# Patient Record
Sex: FEMALE | Race: BLACK OR AFRICAN AMERICAN | NOT HISPANIC OR LATINO | Employment: FULL TIME | URBAN - METROPOLITAN AREA
[De-identification: names, ages, dates, MRNs, and addresses within clinical notes are randomized per-mention and may not be internally consistent; named-entity substitution may affect disease eponyms.]

---

## 2017-01-20 ENCOUNTER — OFFICE VISIT (OUTPATIENT)
Dept: ENDOCRINOLOGY | Facility: CLINIC | Age: 27
End: 2017-01-20

## 2017-01-20 VITALS
SYSTOLIC BLOOD PRESSURE: 125 MMHG | HEART RATE: 76 BPM | HEIGHT: 61 IN | DIASTOLIC BLOOD PRESSURE: 71 MMHG | WEIGHT: 293 LBS | BODY MASS INDEX: 55.32 KG/M2 | OXYGEN SATURATION: 98 %

## 2017-01-20 DIAGNOSIS — E66.01 MORBID OBESITY, UNSPECIFIED OBESITY TYPE (H): Primary | ICD-10-CM

## 2017-01-20 RX ORDER — TOPIRAMATE 25 MG/1
TABLET, FILM COATED ORAL
Qty: 90 TABLET | Refills: 1 | Status: SHIPPED | OUTPATIENT
Start: 2017-01-20 | End: 2017-04-15

## 2017-01-20 RX ORDER — PHENTERMINE HYDROCHLORIDE 37.5 MG/1
37.5 TABLET ORAL
Qty: 30 TABLET | Refills: 3 | Status: SHIPPED | OUTPATIENT
Start: 2017-01-20 | End: 2017-03-17

## 2017-01-20 ASSESSMENT — ENCOUNTER SYMPTOMS
SINUS PAIN: 0
NERVOUS/ANXIOUS: 0
HEADACHES: 0
NAIL CHANGES: 0
TROUBLE SWALLOWING: 0
BREAST PAIN: 0
WEIGHT LOSS: 0
STIFFNESS: 0
BACK PAIN: 0
MUSCLE WEAKNESS: 0
HEMOPTYSIS: 0
LIGHT-HEADEDNESS: 0
SNORES LOUDLY: 0
HOARSE VOICE: 0
FATIGUE: 0
TASTE DISTURBANCE: 0
DYSURIA: 0
POLYPHAGIA: 0
JAUNDICE: 0
EXTREMITY NUMBNESS: 0
RECTAL PAIN: 0
SEIZURES: 0
DOUBLE VISION: 0
MEMORY LOSS: 0
HEMATURIA: 0
WHEEZING: 0
HEARTBURN: 0
LEG PAIN: 0
VOMITING: 0
SYNCOPE: 0
TREMORS: 0
CHILLS: 0
BLOATING: 0
DISTURBANCES IN COORDINATION: 0
DECREASED CONCENTRATION: 0
DECREASED LIBIDO: 0
SHORTNESS OF BREATH: 0
EYE IRRITATION: 0
HOT FLASHES: 0
CONSTIPATION: 1
SKIN CHANGES: 0
POSTURAL DYSPNEA: 0
FEVER: 0
PARALYSIS: 0
SORE THROAT: 0
NIGHT SWEATS: 0
LOSS OF CONSCIOUSNESS: 0
BOWEL INCONTINENCE: 0
DIARRHEA: 0
RESPIRATORY PAIN: 0
POLYDIPSIA: 0
TINGLING: 0
WEAKNESS: 0
DIFFICULTY URINATING: 0
HYPOTENSION: 0
DIZZINESS: 0
ABDOMINAL PAIN: 0
BLOOD IN STOOL: 0
WEIGHT GAIN: 0
NAUSEA: 0
MYALGIAS: 0
DEPRESSION: 0
TACHYCARDIA: 0
JOINT SWELLING: 0
SLEEP DISTURBANCES DUE TO BREATHING: 0
COUGH: 0
POOR WOUND HEALING: 0
SMELL DISTURBANCE: 0
EXERCISE INTOLERANCE: 0
FLANK PAIN: 0
COUGH DISTURBING SLEEP: 0
PANIC: 0
SPUTUM PRODUCTION: 0
EYE REDNESS: 0
INSOMNIA: 0
BRUISES/BLEEDS EASILY: 0
BREAST MASS: 0
CLAUDICATION: 0
NUMBNESS: 0
INCREASED ENERGY: 0
SINUS CONGESTION: 0
HYPERTENSION: 0
MUSCLE CRAMPS: 0
EYE PAIN: 0
NECK MASS: 0
RECTAL BLEEDING: 0
DYSPNEA ON EXERTION: 0
PALPITATIONS: 0
EYE WATERING: 0
ORTHOPNEA: 0
SWOLLEN GLANDS: 0
HALLUCINATIONS: 0
LEG SWELLING: 0
ARTHRALGIAS: 0
DECREASED APPETITE: 0
ALTERED TEMPERATURE REGULATION: 0
SPEECH CHANGE: 0
NECK PAIN: 0

## 2017-01-20 ASSESSMENT — PAIN SCALES - GENERAL: PAINLEVEL: NO PAIN (0)

## 2017-01-20 NOTE — Clinical Note
"2017       RE: Morgan Mcbride  [address]  [facility] [address]     Dear Colleague,    Thank you for referring your patient, Morgan Mcbride, to the [facility] MEDICAL WEIGHT MANAGEMENT at [facility]. Please see a copy of my visit note below.        Return Medical Weight Management Note     Morgan Mcbride  MRN:  [id number]  :  1990  BEA:  2017    Dear Moi Bonilla,    I had the pleasure of seeing your patient Morgan Mcbride.  She is a 26 year old female who I am continuing to see for treatment of obesity related to:    Patient Active Problem List   Diagnosis     Obesity, morbid (H)     Gastric bypass status for obesity     Nausea with vomiting     Epigastric abdominal pain       INTERVAL HISTORY:    She underwent RNY gastric bypass surgery in . She has lost about 150 lbs but gained ~80 lbs back after she was pregnant  She saw me one month ago and we restarted phentermine.  She has had success in the past.  Since last month she has lost 21 lbs.  She feels that the phentermine helps with her appetite but the effect doesn't last all day.     She is having sugar and salt cravings.  She has cut out snacking  She is exercising 3 times per week     CURRENT WEIGHT:   303 lbs 0 oz    Wt Readings from Last 4 Encounters:   17 137.44 kg (303 lb)   16 146.965 kg (324 lb)   16 139.39 kg (307 lb 4.8 oz)   07/14/15 136.578 kg (301 lb 1.6 oz)       Height:  5' 1\"  Body Mass Index:  Body mass index is 57.28 kg/(m^2).  Vitals:  /71 mmHg  Pulse 76  Ht 1.549 m (5' 1\")  Wt 137.44 kg (303 lb)  BMI 57.28 kg/m2  SpO2 98%    Initial consult weight was 301 on 7/7/15.  Weight change since last seen on 2016 is down 21 pounds.   Total gain is 2 pounds.    Diet and Activity Changes Since Last Visit Reviewed With Patient 2017   I have made the following changes to my diet since my last visit: working out and eating " healthier   With regards to my diet, I am still struggling with: feeling full   For breakfast, I typically eat: toast water and fruit   For lunch, I typically eat: chicken or turkey veggies maybe brown rice and  water    For supper, I typically eat: chicken veggies and water   For snack(s), I typically eat: crackers,fruit or flavor water   I have made the following changes to my activity/exercise since my last visit: working out 3-4 a week   With regards to my activity/exercise, I am still struggling with: getting enough sleep       Review of Systems     Constitutional:  Negative for fever, chills, weight loss, weight gain, fatigue, decreased appetite, night sweats, recent stressors, height gain, height loss, post-operative complications, incisional pain, hallucinations, increased energy, hyperactivity and confused.   HENT:  Negative for ear pain, hearing loss, tinnitus, nosebleeds, trouble swallowing, hoarse voice, mouth sores, sore throat, ear discharge, tooth pain, gum tenderness, taste disturbance, smell disturbance, hearing aid, bleeding gums, dry mouth, sinus pain, sinus congestion and neck mass.    Eyes:  Negative for double vision, pain, redness, eye pain, decreased vision, eye watering, eye bulging, eye dryness, flashing lights, spots, floaters, strabismus, tunnel vision, jaundice and eye irritation.   Respiratory:   Negative for cough, hemoptysis, sputum production, shortness of breath, wheezing, sleep disturbances due to breathing, snores loudly, respiratory pain, dyspnea on exertion, cough disturbing sleep and postural dyspnea.    Cardiovascular:  Negative for chest pain, dyspnea on exertion, palpitations, orthopnea, claudication, leg swelling, fingers/toes turn blue, hypertension, hypotension, syncope, history of heart murmur, chest pain on exertion, chest pain at rest, pacemaker, few scattered varicosities, leg pain, sleep disturbances due to breathing, tachycardia, light-headedness, exercise  intolerance and edema.   Gastrointestinal:  Positive for constipation. Negative for heartburn, nausea, vomiting, abdominal pain, diarrhea, blood in stool, melena, rectal pain, bloating, hemorrhoids, bowel incontinence, jaundice, rectal bleeding, coffee ground emesis and change in stool.   Genitourinary:  Negative for bladder incontinence, dysuria, urgency, hematuria, flank pain, vaginal discharge, difficulty urinating, genital sores, dyspareunia, decreased libido, nocturia, voiding less frequently, arousal difficulty, abnormal vaginal bleeding, excessive menstruation, menstrual changes, hot flashes, vaginal dryness and postmenopausal bleeding.   Musculoskeletal:  Negative for myalgias, back pain, joint swelling, arthralgias, stiffness, muscle cramps, neck pain, bone pain, muscle weakness and fracture.   Skin:  Negative for nail changes, itching, poor wound healing, rash, hair changes, skin changes, acne, warts, poor wound healing, scarring, flaky skin, Raynaud's phenomenon, sensitivity to sunlight and skin thickening.   Neurological:  Negative for dizziness, tingling, tremors, speech change, seizures, loss of consciousness, weakness, light-headedness, numbness, headaches, disturbances in coordination, extremity numbness, memory loss, difficulty walking and paralysis.   Endo/Heme:  Negative for anemia, swollen glands and bruises/bleeds easily.   Psychiatric/Behavioral:  Negative for depression, hallucinations, memory loss, decreased concentration, mood swings and panic attacks.    Breast:  Negative for breast discharge, breast mass, breast pain and nipple retraction.   Endocrine:  Negative for altered temperature regulation, polyphagia, polydipsia, unwanted hair growth and change in facial hair.      MEDICATIONS:   Current Outpatient Prescriptions   Medication     phentermine (ADIPEX-P) 37.5 MG tablet     No current facility-administered medications for this visit.       Weight Loss Medication History Reviewed With  Patient 1/20/2017   Which weight loss medications are you currently taking on a regular basis?  Phentermine   If you are not taking a weight loss medication that was prescribed to you, please indicate why: Other   Are you having any side effects from the weight loss medication that we have prescribed you? No   If you are having side effects please describe: na       ASSESSMENT/PLAN: 26 y.o. Female here for [facility] follow up.  She has a history of gastric bypass.  She has lost 21 lbs since last month while taking phentermine.  She is still feeling hungry and cravings.      She has cut out all liquid calories and soda.  She has stopped drinking glass of wine daily.    She would like to consider adding Topiramate ramp up to 75mg at bedtime (she sleeps during the day and works nights). She has nexplanon for birth control.        MEDICATION STARTED AT THIS APPOINTMENT  We are starting topiramate at bedtime.  Start one tab, 25 mg, for a week. Go up to 50 mg (2 tabs) for the next week. At the third week, take   3 tabs (75 mg).  Stay at 3 tabs until you are seen again. Call the nurse at [phone] if you have any questions or concerns. (Do not stop taking it if you don't think it's working. For some people it works even though they do not feel much different.)    Topiramate (Topamax) is a medication that is used most often to treat migraine headaches or for seizures. It has also been found to help with weight loss. Although it's not currently FDA approved for weight loss, it has been used safely for a number of years to help people who are carrying extra weight.     Just how topiramate helps with weight loss has not been exactly determined. However it seems to work on areas of the brain to quiet down signals related to eating.      Topiramate may make you:    >feel less interest in eating in between meals   >think less about food and eating   >find it easier to push the plate away   >find giving up pop easier    >have an  easier time eating less    For some of our patients, the pills work right away. They feel and think quite differently about food. Other patients don't feel much of a change but find in fact they have lost weight! Like all weight loss medications, topiramate works best when you help it work.  This means:    1) Have less tempting high calorie (fattening) food around the house or office    2) Have lower calorie food (fruits, vegetables,low fat meats and dairy) for snacks    3) Eat out only one time or less each week.   4) Eat your meals at a table with the TV or computer off.    Side-effects. Topiramate is generally well tolerated. The main side-effects we see are:   Tingling in hands,feet, or face (usually not very troublesome)   Mental confusion and word finding trouble (about 10% of patients have this.)     Feeling sleepy or a bit dopey- this goes away very soon after starting.    One of the dangers of topiramate is the possibility of birth defects--if you get pregnant when you are on it, there is the risk that your baby will be born with a cleft lip or palate.  If you are on topiramate and of child bearing age, you need to be on a reliable form of birth control or refrain from sexual intercourse.     Please refer to the pharmacy insert for more information on side-effects. Since many pharmacists are not familiar with the use of topiramate in weight loss, calling the clinic will get you the most accurate information on the use of this medication for weight loss.     In order to get refills of this or any medication we prescribe you must be seen in the medical weight mgmt clinic every 2-3 months. Please have your pharmacy fax a refill request to [phone].      FOLLOW-UP:    4 weeks.    Time: 15 min spent on evaluation, management, counseling, education, & motivational interviewing with greater than 50 % of the total time was spent on counseling and coordinating care    Sincerely,    Jonelle Villeda,  PJ

## 2017-01-20 NOTE — MR AVS SNAPSHOT
MRN:[id number]                      After Visit Summary   1/20/2017    Morgan Mcbride    MRN: [id number]           Visit Information        Provider Department      1/20/2017 9:15 AM Jonelle Villeda PA-C [facility] Medical Weight Management        Care Instructions    Continue phentermine 37.5mg daily  Start topiramate ramp up to 75mg  See Jonelle Villeda in 1 month for return medical weight management        MEDICATION STARTED AT THIS APPOINTMENT  We are starting topiramate at bedtime.  Start one tab, 25 mg, for a week. Go up to 50 mg (2 tabs) for the next week. At the third week, take   3 tabs (75 mg).  Stay at 3 tabs until you are seen again. Call the nurse at [phone] if you have any questions or concerns. (Do not stop taking it if you don't think it's working. For some people it works even though they do not feel much different.)    Topiramate (Topamax) is a medication that is used most often to treat migraine headaches or for seizures. It has also been found to help with weight loss. Although it's not currently FDA approved for weight loss, it has been used safely for a number of years to help people who are carrying extra weight.     Just how topiramate helps with weight loss has not been exactly determined. However it seems to work on areas of the brain to quiet down signals related to eating.      Topiramate may make you:    >feel less interest in eating in between meals   >think less about food and eating   >find it easier to push the plate away   >find giving up pop easier    >have an easier time eating less    For some of our patients, the pills work right away. They feel and think quite differently about food. Other patients don't feel much of a change but find in fact they have lost weight! Like all weight loss medications, topiramate works best when you help it work.  This means:    1) Have less tempting high calorie (fattening) food around the house or office    2)  Have lower calorie food (fruits, vegetables,low fat meats and dairy) for snacks    3) Eat out only one time or less each week.   4) Eat your meals at a table with the TV or computer off.    Side-effects. Topiramate is generally well tolerated. The main side-effects we see are:   Tingling in hands,feet, or face (usually not very troublesome)   Mental confusion and word finding trouble (about 10% of patients have this.)     Feeling sleepy or a bit dopey- this goes away very soon after starting.    One of the dangers of topiramate is the possibility of birth defects--if you get pregnant when you are on it, there is the risk that your baby will be born with a cleft lip or palate.  If you are on topiramate and of child bearing age, you need to be on a reliable form of birth control or refrain from sexual intercourse.     Please refer to the pharmacy insert for more information on side-effects. Since many pharmacists are not familiar with the use of topiramate in weight loss, calling the clinic will get you the most accurate information on the use of this medication for weight loss.     In order to get refills of this or any medication we prescribe you must be seen in the medical weight mgmt clinic every 2-3 months. Please have your pharmacy fax a refill request to [phone].               [employer] Information     [employer] gives you secure access to your electronic health record. If you see a primary care provider, you can also send messages to your care team and make appointments. If you have questions, please call your primary care clinic.  If you do not have a primary care provider, please call [phone] and they will assist you.      [employer] is an electronic gateway that provides easy, online access to your medical records. With [employer], you can request a clinic appointment, read your test results, renew a prescription or communicate with your care team.     To access your existing account, please contact your  [facility] or call [phone] for assistance.        Care EveryWhere ID     This is your Care EveryWhere ID. This could be used by other organizations to access your [facility] medical records  [id number]

## 2017-01-20 NOTE — PATIENT INSTRUCTIONS
Continue phentermine 37.5mg daily  Start topiramate ramp up to 75mg  See Jonelle Villeda in 1 month for return medical weight management        MEDICATION STARTED AT THIS APPOINTMENT  We are starting topiramate at bedtime.  Start one tab, 25 mg, for a week. Go up to 50 mg (2 tabs) for the next week. At the third week, take   3 tabs (75 mg).  Stay at 3 tabs until you are seen again. Call the nurse at [phone] if you have any questions or concerns. (Do not stop taking it if you don't think it's working. For some people it works even though they do not feel much different.)    Topiramate (Topamax) is a medication that is used most often to treat migraine headaches or for seizures. It has also been found to help with weight loss. Although it's not currently FDA approved for weight loss, it has been used safely for a number of years to help people who are carrying extra weight.     Just how topiramate helps with weight loss has not been exactly determined. However it seems to work on areas of the brain to quiet down signals related to eating.      Topiramate may make you:    >feel less interest in eating in between meals   >think less about food and eating   >find it easier to push the plate away   >find giving up pop easier    >have an easier time eating less    For some of our patients, the pills work right away. They feel and think quite differently about food. Other patients don't feel much of a change but find in fact they have lost weight! Like all weight loss medications, topiramate works best when you help it work.  This means:    1) Have less tempting high calorie (fattening) food around the house or office    2) Have lower calorie food (fruits, vegetables,low fat meats and dairy) for snacks    3) Eat out only one time or less each week.   4) Eat your meals at a table with the TV or computer off.    Side-effects. Topiramate is generally well tolerated. The main side-effects we see are:   Tingling in  hands,feet, or face (usually not very troublesome)   Mental confusion and word finding trouble (about 10% of patients have this.)     Feeling sleepy or a bit dopey- this goes away very soon after starting.    One of the dangers of topiramate is the possibility of birth defects--if you get pregnant when you are on it, there is the risk that your baby will be born with a cleft lip or palate.  If you are on topiramate and of child bearing age, you need to be on a reliable form of birth control or refrain from sexual intercourse.     Please refer to the pharmacy insert for more information on side-effects. Since many pharmacists are not familiar with the use of topiramate in weight loss, calling the clinic will get you the most accurate information on the use of this medication for weight loss.     In order to get refills of this or any medication we prescribe you must be seen in the medical weight mgmt clinic every 2-3 months. Please have your pharmacy fax a refill request to [phone].

## 2017-01-20 NOTE — NURSING NOTE
"Chief Complaint   Patient presents with     Weight Problem     [facility]       Filed Vitals:    01/20/17 0925   BP: 125/71   Pulse: 76   Height: 5' 1\"   Weight: 303 lb   SpO2: 98%       Body mass index is 57.28 kg/(m^2).    John Saleh CMA                      "

## 2017-01-20 NOTE — PROGRESS NOTES
"    Return Medical Weight Management Note     Morgan Mcbride  MRN:  [id number]  :  1990  BEA:  2017    Dear Moi Bonilla,    I had the pleasure of seeing your patient Morgan Mcbride.  She is a 26 year old female who I am continuing to see for treatment of obesity related to:    Patient Active Problem List   Diagnosis     Obesity, morbid (H)     Gastric bypass status for obesity     Nausea with vomiting     Epigastric abdominal pain       INTERVAL HISTORY:    She underwent RNY gastric bypass surgery in . She has lost about 150 lbs but gained ~80 lbs back after she was pregnant  She saw me one month ago and we restarted phentermine.  She has had success in the past.  Since last month she has lost 21 lbs.  She feels that the phentermine helps with her appetite but the effect doesn't last all day.     She is having sugar and salt cravings.  She has cut out snacking  She is exercising 3 times per week     CURRENT WEIGHT:   303 lbs 0 oz    Wt Readings from Last 4 Encounters:   17 137.44 kg (303 lb)   16 146.965 kg (324 lb)   16 139.39 kg (307 lb 4.8 oz)   07/14/15 136.578 kg (301 lb 1.6 oz)       Height:  5' 1\"  Body Mass Index:  Body mass index is 57.28 kg/(m^2).  Vitals:  /71 mmHg  Pulse 76  Ht 1.549 m (5' 1\")  Wt 137.44 kg (303 lb)  BMI 57.28 kg/m2  SpO2 98%    Initial consult weight was 301 on 7/7/15.  Weight change since last seen on 2016 is down 21 pounds.   Total gain is 2 pounds.    Diet and Activity Changes Since Last Visit Reviewed With Patient 2017   I have made the following changes to my diet since my last visit: working out and eating healthier   With regards to my diet, I am still struggling with: feeling full   For breakfast, I typically eat: toast water and fruit   For lunch, I typically eat: chicken or turkey veggies maybe brown rice and  water    For supper, I typically eat: chicken veggies and water   For snack(s), I typically eat: " crackers,fruit or flavor water   I have made the following changes to my activity/exercise since my last visit: working out 3-4 a week   With regards to my activity/exercise, I am still struggling with: getting enough sleep       Review of Systems     Constitutional:  Negative for fever, chills, weight loss, weight gain, fatigue, decreased appetite, night sweats, recent stressors, height gain, height loss, post-operative complications, incisional pain, hallucinations, increased energy, hyperactivity and confused.   HENT:  Negative for ear pain, hearing loss, tinnitus, nosebleeds, trouble swallowing, hoarse voice, mouth sores, sore throat, ear discharge, tooth pain, gum tenderness, taste disturbance, smell disturbance, hearing aid, bleeding gums, dry mouth, sinus pain, sinus congestion and neck mass.    Eyes:  Negative for double vision, pain, redness, eye pain, decreased vision, eye watering, eye bulging, eye dryness, flashing lights, spots, floaters, strabismus, tunnel vision, jaundice and eye irritation.   Respiratory:   Negative for cough, hemoptysis, sputum production, shortness of breath, wheezing, sleep disturbances due to breathing, snores loudly, respiratory pain, dyspnea on exertion, cough disturbing sleep and postural dyspnea.    Cardiovascular:  Negative for chest pain, dyspnea on exertion, palpitations, orthopnea, claudication, leg swelling, fingers/toes turn blue, hypertension, hypotension, syncope, history of heart murmur, chest pain on exertion, chest pain at rest, pacemaker, few scattered varicosities, leg pain, sleep disturbances due to breathing, tachycardia, light-headedness, exercise intolerance and edema.   Gastrointestinal:  Positive for constipation. Negative for heartburn, nausea, vomiting, abdominal pain, diarrhea, blood in stool, melena, rectal pain, bloating, hemorrhoids, bowel incontinence, jaundice, rectal bleeding, coffee ground emesis and change in stool.   Genitourinary:  Negative for  bladder incontinence, dysuria, urgency, hematuria, flank pain, vaginal discharge, difficulty urinating, genital sores, dyspareunia, decreased libido, nocturia, voiding less frequently, arousal difficulty, abnormal vaginal bleeding, excessive menstruation, menstrual changes, hot flashes, vaginal dryness and postmenopausal bleeding.   Musculoskeletal:  Negative for myalgias, back pain, joint swelling, arthralgias, stiffness, muscle cramps, neck pain, bone pain, muscle weakness and fracture.   Skin:  Negative for nail changes, itching, poor wound healing, rash, hair changes, skin changes, acne, warts, poor wound healing, scarring, flaky skin, Raynaud's phenomenon, sensitivity to sunlight and skin thickening.   Neurological:  Negative for dizziness, tingling, tremors, speech change, seizures, loss of consciousness, weakness, light-headedness, numbness, headaches, disturbances in coordination, extremity numbness, memory loss, difficulty walking and paralysis.   Endo/Heme:  Negative for anemia, swollen glands and bruises/bleeds easily.   Psychiatric/Behavioral:  Negative for depression, hallucinations, memory loss, decreased concentration, mood swings and panic attacks.    Breast:  Negative for breast discharge, breast mass, breast pain and nipple retraction.   Endocrine:  Negative for altered temperature regulation, polyphagia, polydipsia, unwanted hair growth and change in facial hair.      MEDICATIONS:   Current Outpatient Prescriptions   Medication     phentermine (ADIPEX-P) 37.5 MG tablet     No current facility-administered medications for this visit.       Weight Loss Medication History Reviewed With Patient 1/20/2017   Which weight loss medications are you currently taking on a regular basis?  Phentermine   If you are not taking a weight loss medication that was prescribed to you, please indicate why: Other   Are you having any side effects from the weight loss medication that we have prescribed you? No   If you  are having side effects please describe: na       ASSESSMENT/PLAN: 26 y.o. Female here for [facility] follow up.  She has a history of gastric bypass.  She has lost 21 lbs since last month while taking phentermine.  She is still feeling hungry and cravings.      She has cut out all liquid calories and soda.  She has stopped drinking glass of wine daily.    She would like to consider adding Topiramate ramp up to 75mg at bedtime (she sleeps during the day and works nights). She has nexplanon for birth control.        MEDICATION STARTED AT THIS APPOINTMENT  We are starting topiramate at bedtime.  Start one tab, 25 mg, for a week. Go up to 50 mg (2 tabs) for the next week. At the third week, take   3 tabs (75 mg).  Stay at 3 tabs until you are seen again. Call the nurse at [phone] if you have any questions or concerns. (Do not stop taking it if you don't think it's working. For some people it works even though they do not feel much different.)    Topiramate (Topamax) is a medication that is used most often to treat migraine headaches or for seizures. It has also been found to help with weight loss. Although it's not currently FDA approved for weight loss, it has been used safely for a number of years to help people who are carrying extra weight.     Just how topiramate helps with weight loss has not been exactly determined. However it seems to work on areas of the brain to quiet down signals related to eating.      Topiramate may make you:    >feel less interest in eating in between meals   >think less about food and eating   >find it easier to push the plate away   >find giving up pop easier    >have an easier time eating less    For some of our patients, the pills work right away. They feel and think quite differently about food. Other patients don't feel much of a change but find in fact they have lost weight! Like all weight loss medications, topiramate works best when you help it work.  This means:    1) Have less  tempting high calorie (fattening) food around the house or office    2) Have lower calorie food (fruits, vegetables,low fat meats and dairy) for snacks    3) Eat out only one time or less each week.   4) Eat your meals at a table with the TV or computer off.    Side-effects. Topiramate is generally well tolerated. The main side-effects we see are:   Tingling in hands,feet, or face (usually not very troublesome)   Mental confusion and word finding trouble (about 10% of patients have this.)     Feeling sleepy or a bit dopey- this goes away very soon after starting.    One of the dangers of topiramate is the possibility of birth defects--if you get pregnant when you are on it, there is the risk that your baby will be born with a cleft lip or palate.  If you are on topiramate and of child bearing age, you need to be on a reliable form of birth control or refrain from sexual intercourse.     Please refer to the pharmacy insert for more information on side-effects. Since many pharmacists are not familiar with the use of topiramate in weight loss, calling the clinic will get you the most accurate information on the use of this medication for weight loss.     In order to get refills of this or any medication we prescribe you must be seen in the medical weight mgmt clinic every 2-3 months. Please have your pharmacy fax a refill request to [phone].      FOLLOW-UP:    4 weeks.    Time: 15 min spent on evaluation, management, counseling, education, & motivational interviewing with greater than 50 % of the total time was spent on counseling and coordinating care    Sincerely,    Jonelle Villeda PA-C

## 2017-02-21 ENCOUNTER — TELEPHONE (OUTPATIENT)
Dept: ENDOCRINOLOGY | Facility: CLINIC | Age: 27
End: 2017-02-21

## 2017-02-21 NOTE — TELEPHONE ENCOUNTER
Moragn is calling for Jonelle Villeda and Dr. Hammer.  She reports she is required to give a urine drug test for work.  She's on phentermine and Topamax and is concerned she may have a false positive.      Discussed with Melissa, clinic nurse, if there is a problem we can get her documentation that she is taking prescription medication.  She will call us, or have her employer call us if there is an issue.

## 2017-03-17 DIAGNOSIS — E66.01 MORBID OBESITY, UNSPECIFIED OBESITY TYPE (H): ICD-10-CM

## 2017-03-17 RX ORDER — PHENTERMINE HYDROCHLORIDE 37.5 MG/1
37.5 TABLET ORAL
Qty: 30 TABLET | Refills: 0
Start: 2017-03-17 | End: 2017-04-15

## 2017-03-17 NOTE — TELEPHONE ENCOUNTER
Refilled per [facility] Nurse protocol. Melissa Bermeo RN, BSN

## 2017-04-15 ENCOUNTER — OFFICE VISIT (OUTPATIENT)
Dept: ENDOCRINOLOGY | Facility: CLINIC | Age: 27
End: 2017-04-15

## 2017-04-15 VITALS
WEIGHT: 282.5 LBS | HEIGHT: 61 IN | SYSTOLIC BLOOD PRESSURE: 128 MMHG | BODY MASS INDEX: 53.34 KG/M2 | HEART RATE: 89 BPM | TEMPERATURE: 98.8 F | OXYGEN SATURATION: 97 % | DIASTOLIC BLOOD PRESSURE: 80 MMHG

## 2017-04-15 DIAGNOSIS — E66.01 MORBID OBESITY, UNSPECIFIED OBESITY TYPE (H): ICD-10-CM

## 2017-04-15 RX ORDER — TOPIRAMATE 25 MG/1
75 TABLET, FILM COATED ORAL DAILY
Qty: 300 TABLET | Refills: 3 | Status: SHIPPED | OUTPATIENT
Start: 2017-04-15 | End: 2018-04-18

## 2017-04-15 RX ORDER — PHENTERMINE HYDROCHLORIDE 37.5 MG/1
18.75 TABLET ORAL 2 TIMES DAILY
Qty: 30 TABLET | Refills: 5 | Status: SHIPPED | OUTPATIENT
Start: 2017-04-15 | End: 2017-11-03

## 2017-04-15 ASSESSMENT — ENCOUNTER SYMPTOMS
EYE REDNESS: 0
RECTAL PAIN: 1
EYE IRRITATION: 1
MUSCLE WEAKNESS: 1
EYE PAIN: 1
NAUSEA: 0
ABDOMINAL PAIN: 1
BLOATING: 0
RECTAL BLEEDING: 0
CONSTIPATION: 1
NECK PAIN: 0
JOINT SWELLING: 0
JAUNDICE: 0
BLOOD IN STOOL: 1
DOUBLE VISION: 0
MUSCLE CRAMPS: 1
DIARRHEA: 0
BOWEL INCONTINENCE: 0
STIFFNESS: 1
EYE WATERING: 0
HEARTBURN: 0
ARTHRALGIAS: 1
BACK PAIN: 0
VOMITING: 0
MYALGIAS: 1

## 2017-04-15 ASSESSMENT — PAIN SCALES - GENERAL: PAINLEVEL: NO PAIN (0)

## 2017-04-15 NOTE — LETTER
"4/15/2017       RE: Morgan Mcbride  [address]  [facility] [address]     Dear Colleague,    Thank you for referring your patient, Morgan Mcbride, to the [facility] MEDICAL WEIGHT MANAGEMENT at [facility]. Please see a copy of my visit note below.        Return Medical Weight Management Note     Morgan Mcbride  MRN:  [id number]  :  1990  BEA:  4/15/2017    Dear Moi Bonilla,    I had the pleasure of seeing your patient Morgan Mcbride.  She is a 26 year old female who I am continuing to see for treatment of obesity related to: back pain, knee pain and feet pain    S/P RYGB bariatric surgery  with pre-weight 367 and nellie 215 pounds.     CURRENT WEIGHT:   282 lbs 8 oz    Wt Readings from Last 4 Encounters:   04/15/17 128.1 kg (282 lb 8 oz)   17 (!) 137.4 kg (303 lb)   16 (!) 147 kg (324 lb)   16 (!) 139.4 kg (307 lb 4.8 oz)       Height:  5' 1\"  Body Mass Index:  Body mass index is 53.38 kg/(m^2).  Vitals:  B/P: 128/80, P: 89    Initial consult weight was 367 on .  Weight change since last seen on 2017 is down 21 pounds.   Total loss is 85 pounds.    INTERVAL HISTORY:  Says she has changed her food life - no more junk, juices, pop. She feels that the phentermine helps with her appetite but the effect doesn't last all day and isn;t as good as at first. She has barely started the topiramate and takes irregularly. She is having sugar and salt cravings. She is exercising 3 times per week       Diet and Activity Changes Since Last Visit Reviewed With Patient 4/15/2017   I have made the following changes to my diet since my last visit: working out more   With regards to my diet, I am still struggling with: losing weigg   For breakfast, I typically eat: -   For lunch, I typically eat: -   For supper, I typically eat: -   For snack(s), I typically eat: -   I have made the following changes to my activity/exercise " since my last visit: working out more   With regards to my activity/exercise, I am still struggling with: losing wt       MEDICATIONS:   Current Outpatient Prescriptions   Medication     phentermine (ADIPEX-P) 37.5 MG tablet     topiramate (TOPAMAX) 25 MG tablet     No current facility-administered medications for this visit.        Weight Loss Medication History Reviewed With Patient 4/15/2017   Which weight loss medications are you currently taking on a regular basis?  Phentermine, Topamax (topiramate)   If you are not taking a weight loss medication that was prescribed to you, please indicate why: -   Are you having any side effects from the weight loss medication that we have prescribed you? -   If you are having side effects please describe: constipation       ASSESSMENT:   Will change phentermine to 1/2 of 37.5 mg tab at AM and 1/2 at noon. Encourage topiramate. Reinforce diet - focus on no between meal snacking, aggressive lowering of starches and cheese. Encourage miralax for constipation.    FOLLOW-UP:    12 weeks.  10/15 minutes spent on counseling and education    Sincerely,    Gerry Espinosa MD

## 2017-04-15 NOTE — NURSING NOTE
"Chief Complaint   Patient presents with     Clinic Care Coordination - Follow-up     [facility]       Vitals:    04/15/17 0814   BP: 128/80   Pulse: 89   Temp: 98.8  F (37.1  C)   TempSrc: Oral   SpO2: 97%   Weight: 282 lb 8 oz   Height: 5' 1\"       Body mass index is 53.38 kg/(m^2).      Emmie PANCHAL LPN                       "

## 2017-04-15 NOTE — MR AVS SNAPSHOT
After Visit Summary   4/15/2017    Morgan Mcbride    MRN: [id number]           Patient Information     Date Of Birth          1990        Visit Information        Provider Department      4/15/2017 8:00 AM Gerry Espinosa MD [facility] Medical Weight Management        Today's Diagnoses     Morbid obesity, unspecified obesity type (H)           Follow-ups after your visit        Follow-up notes from your care team     Return in about 3 months (around 7/15/2017).      Who to contact     Please call your clinic at [phone] to:    Ask questions about your health    Make or cancel appointments    Discuss your medicines    Learn about your test results    Speak to your doctor   If you have compliments or concerns about an experience at your clinic, or if you wish to file a complaint, please contact [facility] Patient Relations at [phone] or email us at [email or web address]         Additional Information About Your Visit        MyChart Information     [employer] gives you secure access to your electronic health record. If you see a primary care provider, you can also send messages to your care team and make appointments. If you have questions, please call your primary care clinic.  If you do not have a primary care provider, please call [phone] and they will assist you.      [employer] is an electronic gateway that provides easy, online access to your medical records. With [employer], you can request a clinic appointment, read your test results, renew a prescription or communicate with your care team.     To access your existing account, please contact your [facility] or call [phone] for assistance.        Care EveryWhere ID     This is your Care EveryWhere ID. This could be used by other organizations to access your [employer] medical records  [id number]        Your Vitals Were     Pulse Temperature Height Pulse  "Oximetry BMI (Body Mass Index)       89 98.8  F (37.1  C) (Oral) 1.549 m (5' 1\") 97% 53.38 kg/m2        Blood Pressure from Last 3 Encounters:   04/15/17 128/80   01/20/17 125/71   12/09/16 116/65    Weight from Last 3 Encounters:   04/15/17 128.1 kg (282 lb 8 oz)   01/20/17 (!) 137.4 kg (303 lb)   12/09/16 (!) 147 kg (324 lb)              Today, you had the following     No orders found for display         Today's Medication Changes          These changes are accurate as of: 4/15/17  8:37 AM.  If you have any questions, ask your nurse or doctor.               These medicines have changed or have updated prescriptions.        Dose/Directions    phentermine 37.5 MG tablet   Commonly known as:  ADIPEX-P   This may have changed:    - how much to take  - when to take this   Used for:  Morbid obesity, unspecified obesity type (H)   Changed by:  Gerry Espinosa MD        Dose:  18.75 mg   Take 0.5 tablets (18.75 mg) by mouth 2 times daily   Quantity:  30 tablet   Refills:  5       topiramate 25 MG tablet   Commonly known as:  TOPAMAX   This may have changed:    - how much to take  - how to take this  - when to take this  - additional instructions   Used for:  Morbid obesity, unspecified obesity type (H)   Changed by:  Gerry Espinosa MD        Dose:  75 mg   Take 3 tablets (75 mg) by mouth daily   Quantity:  300 tablet   Refills:  3            Where to get your medicines      These medications were sent to [facility] [address] [place] [address] [address] [address]     Phone:  [phone]     topiramate 25 MG tablet         Some of these will need a paper prescription and others can be bought over the counter.  Ask your nurse if you have questions.     Bring a paper prescription for each of these medications     phentermine 37.5 MG tablet                Primary Care Provider Office Phone # Fax #    Moi Bonilla [phone]       [place] " [facility] [address]  [facility] [address]        Thank you!     Thank you for choosing [facility] WEIGHT MANAGEMENT  for your care. Our goal is always to provide you with excellent care. Hearing back from our patients is one way we can continue to improve our services. Please take a few minutes to complete the written survey that you may receive in the mail after your visit with us. Thank you!             Your Updated Medication List - Protect others around you: Learn how to safely use, store and throw away your medicines at www.disposemymeds.org.          This list is accurate as of: 4/15/17  8:37 AM.  Always use your most recent med list.                   Brand Name Dispense Instructions for use    phentermine 37.5 MG tablet    ADIPEX-P    30 tablet    Take 0.5 tablets (18.75 mg) by mouth 2 times daily       topiramate 25 MG tablet    TOPAMAX    300 tablet    Take 3 tablets (75 mg) by mouth daily

## 2017-04-15 NOTE — PROGRESS NOTES
"    Return Medical Weight Management Note     Morgan Mcbride  MRN:  [id number]  :  1990  BEA:  4/15/2017    Dear Moi Bonilla,    I had the pleasure of seeing your patient Morgan Mcbride.  She is a 26 year old female who I am continuing to see for treatment of obesity related to: back pain, knee pain and feet pain    S/P RYGB bariatric surgery  with pre-weight 367 and nellie 215 pounds.     CURRENT WEIGHT:   282 lbs 8 oz    Wt Readings from Last 4 Encounters:   04/15/17 128.1 kg (282 lb 8 oz)   17 (!) 137.4 kg (303 lb)   16 (!) 147 kg (324 lb)   16 (!) 139.4 kg (307 lb 4.8 oz)       Height:  5' 1\"  Body Mass Index:  Body mass index is 53.38 kg/(m^2).  Vitals:  B/P: 128/80, P: 89    Initial consult weight was 367 on .  Weight change since last seen on 2017 is down 21 pounds.   Total loss is 85 pounds.    INTERVAL HISTORY:  Says she has changed her food life - no more junk, juices, pop. She feels that the phentermine helps with her appetite but the effect doesn't last all day and isn;t as good as at first. She has barely started the topiramate and takes irregularly. She is having sugar and salt cravings. She is exercising 3 times per week       Diet and Activity Changes Since Last Visit Reviewed With Patient 4/15/2017   I have made the following changes to my diet since my last visit: working out more   With regards to my diet, I am still struggling with: losing weigg   For breakfast, I typically eat: -   For lunch, I typically eat: -   For supper, I typically eat: -   For snack(s), I typically eat: -   I have made the following changes to my activity/exercise since my last visit: working out more   With regards to my activity/exercise, I am still struggling with: losing wt       MEDICATIONS:   Current Outpatient Prescriptions   Medication     phentermine (ADIPEX-P) 37.5 MG tablet     topiramate (TOPAMAX) 25 MG tablet     No current facility-administered medications for " this visit.        Weight Loss Medication History Reviewed With Patient 4/15/2017   Which weight loss medications are you currently taking on a regular basis?  Phentermine, Topamax (topiramate)   If you are not taking a weight loss medication that was prescribed to you, please indicate why: -   Are you having any side effects from the weight loss medication that we have prescribed you? -   If you are having side effects please describe: constipation       ASSESSMENT:   Will change phentermine to 1/2 of 37.5 mg tab at AM and 1/2 at noon. Encourage topiramate. Reinforce diet - focus on no between meal snacking, aggressive lowering of starches and cheese. Encourage miralax for constipation.    FOLLOW-UP:    12 weeks.  10/15 minutes spent on counseling and education    Sincerely,    Gerry Espinosa MD

## 2017-08-19 ENCOUNTER — HEALTH MAINTENANCE LETTER (OUTPATIENT)
Age: 27
End: 2017-08-19

## 2017-11-03 ENCOUNTER — TELEPHONE (OUTPATIENT)
Dept: ENDOCRINOLOGY | Facility: CLINIC | Age: 27
End: 2017-11-03

## 2017-11-03 DIAGNOSIS — E66.01 MORBID OBESITY, UNSPECIFIED OBESITY TYPE (H): ICD-10-CM

## 2017-11-03 RX ORDER — PHENTERMINE HYDROCHLORIDE 37.5 MG/1
18.75 TABLET ORAL 2 TIMES DAILY
Qty: 30 TABLET | Refills: 1 | Status: SHIPPED | OUTPATIENT
Start: 2017-11-03 | End: 2018-08-21

## 2018-04-18 DIAGNOSIS — E66.01 MORBID OBESITY, UNSPECIFIED OBESITY TYPE (H): ICD-10-CM

## 2018-04-19 RX ORDER — TOPIRAMATE 25 MG/1
75 TABLET, FILM COATED ORAL DAILY
Qty: 90 TABLET | Refills: 0 | COMMUNITY
Start: 2018-04-19 | End: 2018-08-21

## 2018-04-19 NOTE — TELEPHONE ENCOUNTER
Called into [facility] [place]. Patient must keep appointment next month in order to receive future refills.

## 2018-04-19 NOTE — TELEPHONE ENCOUNTER
topiramate (TOPAMAX) 25 MG tablet  Last Written Prescription Date: 4/15/17  Last Fill Quantity: 300,   # refills: 3rfs  Last Office Visit : 4/15/17  Future Office visit: 5/8/18    Routing  Because:  ALT/AST past due ,   f/u appt 5/8/18  90 tabs to pharmacy

## 2018-08-16 DIAGNOSIS — E66.01 MORBID OBESITY, UNSPECIFIED OBESITY TYPE (H): ICD-10-CM

## 2018-08-18 RX ORDER — PHENTERMINE HYDROCHLORIDE 37.5 MG/1
TABLET ORAL
Qty: 30 TABLET | Refills: 1 | OUTPATIENT
Start: 2018-08-18

## 2018-08-18 NOTE — TELEPHONE ENCOUNTER
Received refill request for  phentermine (ADIPEX-  . Patient needs appointment scheduled prior to any refills. Clinic Coordinator notified and will follow up with the patient as appropriate. The pharmacy has been notified that the medication will not be refilled prior to an appointment being scheduled.    Scheduling has been notified to contact the pt for appointment.

## 2018-08-21 DIAGNOSIS — E66.01 MORBID OBESITY, UNSPECIFIED OBESITY TYPE (H): ICD-10-CM

## 2018-08-21 NOTE — TELEPHONE ENCOUNTER
topiramate (TOPAMAX) 25 MG tablet      Last Written Prescription Date:  4/19/18  Last Fill Quantity: 90,   # refills: 0  Last Office Visit : 4/15/17  Future Office visit:  10/29/18    Routing refill request to provider for review/approval because:  > 15 months since last seen and No-Show appt's 12/19/17, 5/8/18.    *FYI: patient does have pending appt for 10/29/18 that was scheduled yesterday.

## 2018-08-22 RX ORDER — TOPIRAMATE 25 MG/1
75 TABLET, FILM COATED ORAL DAILY
Qty: 90 TABLET | Refills: 3 | Status: SHIPPED | OUTPATIENT
Start: 2018-08-22 | End: 2019-06-04

## 2018-08-22 NOTE — TELEPHONE ENCOUNTER
Attempted to call patient, no answer and mailbox full. Patient requesting refill of Phentermine. Refill DENIED. Patient hasn't been seen since 4/2017. Patient no showed 2 follow up appointments. Topiramate filled today, no Phentermine fill until seen in clinic.

## 2018-08-22 NOTE — TELEPHONE ENCOUNTER
Patient called back. Advised patient that Topiramate script was sent for her, but Phentermine would not be ordered until seen by provider. Instructed on dosing titration of Topiramate, as patient stated she has been off for a while. Gave scheduling number to be put on the wait list for any sooner available provider. Patient also asked about scheduling appointment for bariatric consult. Advised to schedule NBS with Jonelle Villeda. Patient understood.

## 2018-08-22 NOTE — TELEPHONE ENCOUNTER
phentermine  Last Written Prescription Date:  11/3/17  Last Fill Quantity: 30,   # refills: 1  Last Office Visit : 4/15/17  Future Office visit:  10/29/18    Routing refill request to nurse for review/approval because:  Drug not on the [facility], P or [facility] refill protocol or controlled substance

## 2018-08-23 RX ORDER — PHENTERMINE HYDROCHLORIDE 37.5 MG/1
TABLET ORAL
Qty: 30 TABLET | Refills: 1 | Status: SHIPPED
Start: 2018-08-23 | End: 2019-06-04

## 2018-08-27 ENCOUNTER — CARE COORDINATION (OUTPATIENT)
Dept: SURGERY | Facility: CLINIC | Age: 28
End: 2018-08-27

## 2019-06-04 ENCOUNTER — OFFICE VISIT (OUTPATIENT)
Dept: ENDOCRINOLOGY | Facility: CLINIC | Age: 29
End: 2019-06-04
Payer: COMMERCIAL

## 2019-06-04 VITALS
OXYGEN SATURATION: 100 % | SYSTOLIC BLOOD PRESSURE: 131 MMHG | WEIGHT: 293 LBS | DIASTOLIC BLOOD PRESSURE: 84 MMHG | BODY MASS INDEX: 55.32 KG/M2 | HEIGHT: 61 IN | HEART RATE: 96 BPM

## 2019-06-04 DIAGNOSIS — Z98.84 BARIATRIC SURGERY STATUS: Primary | ICD-10-CM

## 2019-06-04 DIAGNOSIS — E66.01 MORBID OBESITY, UNSPECIFIED OBESITY TYPE (H): ICD-10-CM

## 2019-06-04 RX ORDER — PHENTERMINE HYDROCHLORIDE 37.5 MG/1
0.5 TABLET ORAL EVERY MORNING
Qty: 30 TABLET | Refills: 1 | Status: SHIPPED | OUTPATIENT
Start: 2019-06-04 | End: 2020-11-19

## 2019-06-04 RX ORDER — TOPIRAMATE 25 MG/1
TABLET, FILM COATED ORAL
Qty: 90 TABLET | Refills: 1 | Status: SHIPPED | OUTPATIENT
Start: 2019-06-04 | End: 2019-06-04

## 2019-06-04 RX ORDER — TOPIRAMATE 25 MG/1
TABLET, FILM COATED ORAL
Qty: 270 TABLET | Refills: 1 | Status: SHIPPED | OUTPATIENT
Start: 2019-06-04 | End: 2020-11-19

## 2019-06-04 RX ORDER — PHENTERMINE HYDROCHLORIDE 37.5 MG/1
0.5 TABLET ORAL EVERY MORNING
Qty: 15 TABLET | Refills: 1 | Status: SHIPPED | OUTPATIENT
Start: 2019-06-04 | End: 2019-06-04

## 2019-06-04 ASSESSMENT — PATIENT HEALTH QUESTIONNAIRE - PHQ9
SUM OF ALL RESPONSES TO PHQ QUESTIONS 1-9: 6
SUM OF ALL RESPONSES TO PHQ QUESTIONS 1-9: 6

## 2019-06-04 ASSESSMENT — PAIN SCALES - GENERAL: PAINLEVEL: NO PAIN (0)

## 2019-06-04 ASSESSMENT — MIFFLIN-ST. JEOR: SCORE: 2085.31

## 2019-06-04 NOTE — LETTER
2019       RE: Morgan Mcbride  [address]  [facility] [address]     Dear Colleague,    Thank you for referring your patient, Morgan Mcbride, to the [facility] MEDICAL WEIGHT MANAGEMENT at [facility]. Please see a copy of my visit note below.        Return Medical Weight Management Note     Morgan Mcbride  MRN:  [id number]  :  1990  BEA:  2019    Dear Antoni Bonilla,    I had the pleasure of seeing your patient Morgan Mcbride.  She is a 29 year old female who I am continuing to see for treatment of obesity related to:    No flowsheet data found.    INTERVAL HISTORY:  RNY 2012    Pregnancy  with 70lb weight gain    Last seen by Dr. Espinosa 4/15/2017. Had worked with [facility] from  to  and lost 85lbs at the time of that visit. Was taking 1/2 tab of phentermine twice daily and topiramate was being encouraged 75mg (took 1 full tab of phentermine instead of 1/2 twice daily- felt more effective)    Hasn't been on phentermine or topiramate in about 9months. Only took topiramate for 6 months, on and off, didn't think it was effective- only got to 75 mg   Never took topiramate without phentermine - felt like she got heart racing and palpitations while on topiramate? But had recently increased phentermine to twice daily around the same time    Has noticed weight gain since stopping the phentermine. Would like to get back on track. She is not currently taking bariatric vitamins, has not followed up with surgery or dietitians.     Struggles with hunger, cravings, and high calorie beverages. Disordered eating - will not eat all day, be very hungry in the evening, then not be able to eat much at dinner and ends up waking up at night to eat.       CURRENT WEIGHT:   313 lbs 11.2 oz    Wt Readings from Last 4 Encounters:   19 142.3 kg (313 lb 11.2 oz)   04/15/17 128.1 kg (282 lb 8 oz)   17 (!) 137.4 kg (303 lb)   16 (!)  "147 kg (324 lb)       Height:  5' 1\"  Body Mass Index:  Body mass index is 53.38 kg/m .  /84 (BP Location: Left arm, Patient Position: Sitting, Cuff Size: Adult Large)   Pulse 96   Ht 1.549 m (5' 1\")   Wt 142.3 kg (313 lb 11.2 oz)   SpO2 100%   Breastfeeding? No   BMI 59.27 kg/m      PCP 4/3/19 with /80    Initial consult weight was 367 on 2012.  Weight change since last seen on 282 is up 31 pounds.   Total loss is 54 pounds.    Diet and Activity Changes Since Last Visit Reviewed With Patient 6/4/2019   I have made the following changes to my diet since my last visit: -   With regards to my diet, I am still struggling with: losing weight   For breakfast, I typically eat: -   For lunch, I typically eat: -   For supper, I typically eat: -   For snack(s), I typically eat: -   I have made the following changes to my activity/exercise since my last visit: working out more walking on my breaks less snacking   With regards to my activity/exercise, I am still struggling with: losing weight       MEDICATIONS:   Current Outpatient Medications   Medication     phentermine (ADIPEX-P) 37.5 MG tablet     topiramate (TOPAMAX) 25 MG tablet     No current facility-administered medications for this visit.        Weight Loss Medication History Reviewed With Patient 6/4/2019   Which weight loss medications are you currently taking on a regular basis?  None   If you are not taking a weight loss medication that was prescribed to you, please indicate why: Other   Are you having any side effects from the weight loss medication that we have prescribed you? No   If you are having side effects please describe: -       ASSESSMENT:   7 years post RNY. Struggled with weight since pregnancy in 2014. Weight regain since lost to follow up 2 years ago. Stopped phentermine 9 months ago. She would like to restart phentermine. She is hesitant about topiramate. She felt like the phentermine helped with appetite and the topiramate " caused racing heart at night. Discussed that it is more likely that she had the increased heart rate at night due to the increase in phentermine around the same time as trying the topiramate. Concern about blood pressure being borderline high in the last several months. Would be okay trying phentermine again if she agrees to check blood pressure in 1 week and report back. Strongly encouraged her to try topiramate again to help with calorie beverages, cravings, night time eating.     Stressed regular meals. Recommended considering meal replacements to help encourage more regular eating pattern and avoid binging and night time eating. Stressed dietitian follow up in the next 2 weeks    PLAN:   No meal skipping  Dietician visit of education  Volumetrics eating plan  Meal planning  Labs in the next week  Blood pressure check in 1 week    FOLLOW-UP:    8 weeks.    Dietitian visit prior to follow up with me    Time: 15 min spent on evaluation, management, counseling, education, & motivational interviewing with greater than 50 % of the total time was spent on counseling and coordinating care    Sincerely,    Fatou Maki NP

## 2019-06-04 NOTE — NURSING NOTE
"Chief Complaint   Patient presents with     Weight Problem     RMWM, previously seen by Dr. Espinosa and previously on topiramate, hx of RNY 2012       Vitals:    06/04/19 0821   BP: 131/84   BP Location: Left arm   Patient Position: Sitting   Cuff Size: Adult Large   Pulse: 96   SpO2: 100%   Weight: 142.3 kg (313 lb 11.2 oz)   Height: 1.549 m (5' 1\")       Body mass index is 59.27 kg/m .    Mackenzie Donald, JACQUELYN    "

## 2019-06-04 NOTE — PROGRESS NOTES
"    Return Medical Weight Management Note     Morgan Mcbride  MRN:  [id number]  :  1990  BEA:  2019    Dear , Antoni BRANDT,    I had the pleasure of seeing your patient Morgan Mcbride.  She is a 29 year old female who I am continuing to see for treatment of obesity related to:    No flowsheet data found.    INTERVAL HISTORY:  RNY 2012    Pregnancy 2014 with 70lb weight gain    Last seen by Dr. Espinosa 4/15/2017. Had worked with [facility] from  to  and lost 85lbs at the time of that visit. Was taking 1/2 tab of phentermine twice daily and topiramate was being encouraged 75mg (took 1 full tab of phentermine instead of 1/2 twice daily- felt more effective)    Hasn't been on phentermine or topiramate in about 9months. Only took topiramate for 6 months, on and off, didn't think it was effective- only got to 75 mg   Never took topiramate without phentermine - felt like she got heart racing and palpitations while on topiramate? But had recently increased phentermine to twice daily around the same time    Has noticed weight gain since stopping the phentermine. Would like to get back on track. She is not currently taking bariatric vitamins, has not followed up with surgery or dietitians.     Struggles with hunger, cravings, and high calorie beverages. Disordered eating - will not eat all day, be very hungry in the evening, then not be able to eat much at dinner and ends up waking up at night to eat.       CURRENT WEIGHT:   313 lbs 11.2 oz    Wt Readings from Last 4 Encounters:   19 142.3 kg (313 lb 11.2 oz)   04/15/17 128.1 kg (282 lb 8 oz)   17 (!) 137.4 kg (303 lb)   16 (!) 147 kg (324 lb)       Height:  5' 1\"  Body Mass Index:  Body mass index is 53.38 kg/m .  /84 (BP Location: Left arm, Patient Position: Sitting, Cuff Size: Adult Large)   Pulse 96   Ht 1.549 m (5' 1\")   Wt 142.3 kg (313 lb 11.2 oz)   SpO2 100%   Breastfeeding? No   BMI 59.27 kg/m     PCP 4/3/19 " with /80    Initial consult weight was 367 on 2012.  Weight change since last seen on 282 is up 31 pounds.   Total loss is 54 pounds.    Diet and Activity Changes Since Last Visit Reviewed With Patient 6/4/2019   I have made the following changes to my diet since my last visit: -   With regards to my diet, I am still struggling with: losing weight   For breakfast, I typically eat: -   For lunch, I typically eat: -   For supper, I typically eat: -   For snack(s), I typically eat: -   I have made the following changes to my activity/exercise since my last visit: working out more walking on my breaks less snacking   With regards to my activity/exercise, I am still struggling with: losing weight       MEDICATIONS:   Current Outpatient Medications   Medication     phentermine (ADIPEX-P) 37.5 MG tablet     topiramate (TOPAMAX) 25 MG tablet     No current facility-administered medications for this visit.        Weight Loss Medication History Reviewed With Patient 6/4/2019   Which weight loss medications are you currently taking on a regular basis?  None   If you are not taking a weight loss medication that was prescribed to you, please indicate why: Other   Are you having any side effects from the weight loss medication that we have prescribed you? No   If you are having side effects please describe: -       ASSESSMENT:   7 years post RNY. Struggled with weight since pregnancy in 2014. Weight regain since lost to follow up 2 years ago. Stopped phentermine 9 months ago. She would like to restart phentermine. She is hesitant about topiramate. She felt like the phentermine helped with appetite and the topiramate caused racing heart at night. Discussed that it is more likely that she had the increased heart rate at night due to the increase in phentermine around the same time as trying the topiramate. Concern about blood pressure being borderline high in the last several months. Would be okay trying phentermine again if  she agrees to check blood pressure in 1 week and report back. Strongly encouraged her to try topiramate again to help with calorie beverages, cravings, night time eating.     Stressed regular meals. Recommended considering meal replacements to help encourage more regular eating pattern and avoid binging and night time eating. Stressed dietitian follow up in the next 2 weeks    PLAN:   No meal skipping  Dietician visit of education  Volumetrics eating plan  Meal planning  Labs in the next week  Blood pressure check in 1 week    FOLLOW-UP:    8 weeks.    Dietitian visit prior to follow up with me    Time: 15 min spent on evaluation, management, counseling, education, & motivational interviewing with greater than 50 % of the total time was spent on counseling and coordinating care    Sincerely,    Fatou Maki NP  Answers for HPI/ROS submitted by the patient on 6/4/2019   PHQ9 TOTAL SCORE: 6

## 2019-06-04 NOTE — PATIENT INSTRUCTIONS
Welcome to our weight management program!   We are excited to join you on your weight loss journey    Thank you for allowing us the privilege of caring for you. We hope we provided you with the excellent service you deserve.    Please let us know if there is anything else we can do so that we can be sure you are leaving completely satisfied with your care experience.    You saw NIEVES Patiño CNP today.    Instructions per today's visit:   Phentermine 1/2 tab in the AM  Topiramate 75mg in the PM  Blood pressure check and labs in 1 week- any [facility]  Dietitian visits in the next couple of weeks  Follow up with me in 2 months  Work on increasing activity   Increase Protein, decrease carbohydrates   Consistency is key!!    For any questions/concerns contact Jerri Keane LPN at [phone] or Caro Chavarria RN at [phone]    To schedule appointments with our team, please call [phone]     Please call during clinic hours Monday through Friday 8:00a - 4:00p if you have questions or you can contact us via [employer] at anytime. ?    Lab results will be communicated through My Chart or letter (if My Chart not used). Please call the clinic if you have not received communication after 1 week or if you have any questions.?      Fax: [phone]    Thank you,  Medical Weight Management Team                             MEDICATION STARTED AT THIS APPOINTMENT    We are starting Phentermine. Take one tablet in the morning.  Call the nurse at [phone] if you have any questions or concerns. (Do not stop taking it if you don't think it's working. For some people it works without them knowing it.)    Phentermine is being prescribed because you identified hunger as one of the main causes for your extra weight.      Our patients on Phentermine find that they:    >feel less hunger    >find it easier to push the plate away   >have an easier time eating less    For some of our patients, these feelings are very real  and immediate. For other patients, the feelings are less obvious. They don't feel much of a change but find they've lost weight. Like all weight loss medications, Phentermine  works best when you help it work. This means:  1. Having less tempting high calorie (fattening) food around the house or office. (For people with strong cravings this is very important.)   2. Staying away from situations or people that may trigger your cravings .   3. Eating out only one time or less each week.  4. Eating your meals at a table with the TV or computer off.    Side-effects. Phentermine is generally well tolerated. The main side-effects we see are feelings of racing pulse or rapid heart beat. Some people can get an elevated blood pressure. Because of this we may have you come back within a week or so of starting the medication for a blood pressure check.         In order to get refills of this or any medication we prescribe you must be seen in the medical weight mgmt clinic every 2-3 months. Please have your pharmacy fax a refill request to [phone].      MEDICATION STARTED AT THIS APPOINTMENT  We are starting topiramate at bedtime.  Start one tab, 25 mg, for a week. Go up to 50 mg (2 tabs) for the next week. At the third week, take   3 tabs (75 mg).  Stay at 3 tabs until you are seen again. Call the nurse at [phone] if you have any questions or concerns. (Do not stop taking it if you don't think it's working. For some people it works even though they do not feel much different.)    Topiramate (Topamax) is a medication that is used most often to treat migraine headaches or for seizures. It has also been found to help with weight loss. Although it's not currently FDA approved for weight loss, it has been used safely for a number of years to help people who are carrying extra weight.     Just how topiramate helps with weight loss has not been exactly determined. However it seems to work on areas of the brain to quiet down  signals related to eating.      Topiramate may make you:    >feel less interest in eating in between meals   >think less about food and eating   >find it easier to push the plate away   >find giving up pop easier    >have an easier time eating less    For some of our patients, the pills work right away. They feel and think quite differently about food. Other patients don't feel much of a change but find in fact they have lost weight! Like all weight loss medications, topiramate works best when you help it work.  This means:    1) Have less tempting high calorie (fattening) food around the house or office    2) Have lower calorie food (fruits, vegetables,low fat meats and dairy) for snacks    3) Eat out only one time or less each week.   4) Eat your meals at a table with the TV or computer off.    Side-effects. Topiramate is generally well tolerated. The main side-effects we see are:   Tingling in hands,feet, or face (usually not very troublesome)   Mental confusion and word finding trouble (about 10% of patients have this.)     Feeling sleepy or a bit dopey- this goes away very soon after starting.    One of the dangers of topiramate is the possibility of birth defects--if you get pregnant when you are on it, there is the risk that your baby will be born with a cleft lip or palate.  If you are on topiramate and of child bearing age, you need to be on a reliable form of birth control or refrain from sexual intercourse.     Please refer to the pharmacy insert for more information on side-effects. Since many pharmacists are not familiar with the use of topiramate in weight loss, calling the clinic will get you the most accurate information on the use of this medication for weight loss.     In order to get refills of this or any medication we prescribe you must be seen in the medical weight mgmt clinic every 2-3 months. Please have your pharmacy fax a refill request to [phone].

## 2019-06-05 ASSESSMENT — PATIENT HEALTH QUESTIONNAIRE - PHQ9: SUM OF ALL RESPONSES TO PHQ QUESTIONS 1-9: 6

## 2019-11-06 ENCOUNTER — HEALTH MAINTENANCE LETTER (OUTPATIENT)
Age: 29
End: 2019-11-06

## 2020-11-19 ENCOUNTER — VIRTUAL VISIT (OUTPATIENT)
Dept: ENDOCRINOLOGY | Facility: CLINIC | Age: 30
End: 2020-11-19
Payer: COMMERCIAL

## 2020-11-19 VITALS — HEIGHT: 61 IN | WEIGHT: 293 LBS | BODY MASS INDEX: 55.32 KG/M2

## 2020-11-19 DIAGNOSIS — E66.813 CLASS 3 DRUG-INDUCED OBESITY WITH SERIOUS COMORBIDITY AND BODY MASS INDEX (BMI) OF 50.0 TO 59.9 IN ADULT (H): ICD-10-CM

## 2020-11-19 DIAGNOSIS — E66.1 CLASS 3 DRUG-INDUCED OBESITY WITH SERIOUS COMORBIDITY AND BODY MASS INDEX (BMI) OF 50.0 TO 59.9 IN ADULT (H): ICD-10-CM

## 2020-11-19 DIAGNOSIS — K59.00 CONSTIPATION, UNSPECIFIED CONSTIPATION TYPE: ICD-10-CM

## 2020-11-19 DIAGNOSIS — Z98.84 S/P GASTRIC BYPASS: Primary | ICD-10-CM

## 2020-11-19 PROBLEM — I10 ESSENTIAL HYPERTENSION: Status: ACTIVE | Noted: 2019-07-19

## 2020-11-19 PROBLEM — D51.0 VITAMIN B12 DEFICIENCY ANEMIA DUE TO INTRINSIC FACTOR DEFICIENCY: Status: ACTIVE | Noted: 2017-07-14

## 2020-11-19 PROBLEM — E55.9 VITAMIN D DEFICIENCY: Status: ACTIVE | Noted: 2018-08-13

## 2020-11-19 PROBLEM — Z30.42 DEPO-PROVERA CONTRACEPTIVE STATUS: Status: ACTIVE | Noted: 2018-05-09

## 2020-11-19 PROBLEM — R73.03 PREDIABETES: Status: ACTIVE | Noted: 2018-08-13

## 2020-11-19 PROCEDURE — 99214 OFFICE O/P EST MOD 30 MIN: CPT | Mod: 95 | Performed by: NURSE PRACTITIONER

## 2020-11-19 RX ORDER — HYDROXYZINE HYDROCHLORIDE 25 MG/1
50 TABLET, FILM COATED ORAL PRN
COMMUNITY
Start: 2020-07-28 | End: 2021-07-22

## 2020-11-19 RX ORDER — MEDROXYPROGESTERONE ACETATE 150 MG/ML
INJECTION, SUSPENSION INTRAMUSCULAR
COMMUNITY
End: 2021-07-08

## 2020-11-19 RX ORDER — MEDROXYPROGESTERONE ACETATE 150 MG/ML
150 INJECTION, SUSPENSION INTRAMUSCULAR
COMMUNITY
Start: 2020-10-12 | End: 2021-08-16

## 2020-11-19 RX ORDER — POLYETHYLENE GLYCOL 3350 17 G/17G
1 POWDER, FOR SOLUTION ORAL DAILY
Qty: 527 G | Refills: 3 | Status: SHIPPED | OUTPATIENT
Start: 2020-11-19 | End: 2021-07-22

## 2020-11-19 RX ORDER — LISINOPRIL 5 MG/1
5 TABLET ORAL
COMMUNITY
Start: 2020-06-29 | End: 2021-07-22

## 2020-11-19 RX ORDER — OMEPRAZOLE 40 MG/1
40 CAPSULE, DELAYED RELEASE ORAL DAILY
Qty: 30 CAPSULE | Refills: 3 | Status: SHIPPED | OUTPATIENT
Start: 2020-11-19 | End: 2021-07-22

## 2020-11-19 ASSESSMENT — MIFFLIN-ST. JEOR: SCORE: 2049.92

## 2020-11-19 ASSESSMENT — PAIN SCALES - GENERAL: PAINLEVEL: NO PAIN (0)

## 2020-11-19 NOTE — PATIENT INSTRUCTIONS
"Thank you for allowing us the privilege of caring for you. We hope we provided you with the excellent service you deserve.   Please let us know if there is anything else we can do for you so that we can be sure you are completely satisfied with your care experience.    To ensure the quality of our services you may be receiving a patient satisfaction survey from an independent patient satisfaction monitoring company.    The greatest compliment you can give is a \"Likely to Recommend\"    Your visit was with Fatou Maki NP today.    Instructions per today's visit:     Pollo Mcbride, it was great to visit with you today.  Here is a review of our visit.  If our clinic scheduler is not able to reach you please call [phone] to schedule your next appointments.    -start omeprazole 40mg, open capsule and mix with teaspoon of applesauce  -miralax 2 cap fulls daily until you have a bowel movement, then do one cap full  -start flinstone vitamin twice daily - tablet not gummy   -work on increasing water   -get labs when able   -follow up in 4 weeks - see dietitian before that time   Please call our contact center at [phone] to schedule your next appointments.  To find a lab location near you, please call [phone].  For any nursing questions or concerns call Jerri Keane LPN at [phone] or Oneida Dorantes RN at [phone]  Please call during clinic hours Monday through Friday 8:00a - 4:00p if you have questions or you can contact us via [employer]) at anytime and we will reply during clinic hours.    Lab results will be communicated through My Chart or letter (if My Chart not used). Please call the clinic if you have not received communication after 1 week or if you have any questions.?  Clinic Fax: [phone]  ______________________________________________________________________________________________________________________  Meal Replacement Products:    Here is the link to our new e-store " where you can purchase our meal replacement products     [employer] E[employer]ang[employer]ransStore  [email or web address]    The one week starter kit is a great way to sample a variety of products and see what works for you.    If you want more information about the product go to: Fresh Steps Meals  [employer] [email or web address] Socket    If you are an employee or [facility] or [facility] please contact your care team for a 10% estore discount    Free Shipping for orders over $75     Benefits of meal replacements products:    Portion and calorie control  Improved nutrition  Structured eating  Simplified food choices  Avoid contact with trigger foods  _________________________________________________________________________________________________________________________________  Interested in working with a health ?  Health coaches work with you to improve your overall health and wellbeing.  They look at the whole person, and may involve discussion of different areas of life, including, but not limited to the four pillars of health (sleep, exercise, nutrition, and stress management). Discuss with your care team if you would like to start working a health .  Health Coaching-3 Pack: Schedule by calling [phone]    $99 for three health coaching visits    Visits may be done in person or via phone    Coaching is a partnership between the  and the client; Coaches do not prescribe or diagnose    Coaching helps inspire the client to reach his/her personal goals   _________________________________________________________________________________________________________________________________  Healthy Lifestyle Support Group   [facility] Weight Management Program  Virtual Support Group Now Available    60 minutes of small group guided discussion, support and resources    Led by Health , Merlyn Mcwilliams    For questions, and to receive the invite information, contact Merlyn at [email or web address]    All  our welcome!    One Friday per month, 12:30pm to 1:30pm  SELF COMPASSION: July 31st  CREATING MY WELLNESS VISION: August 28th  MINDFULNESS PRACTICES FOR A CALM BODY & MIND: September 25th  MINDFUL EATING TOOLS: October 30th  HEALTHY& HAPPY HOLIDAYS: November 20th  OPEN FORUM: December 18th  _______________________________________________________________________________________________________________________________  Connections: Bariatric Care support group  Due to the Covid-19 restrictions, we will be doing our support group virtually using Microsoft Teams. You will need to get an invitation to the group from Suresh Hernandez, Ph.D., LP at [email or web address] and to learn about using Microsoft Teams. The next meeting is on Tuesday, July 14 between 6:30 and 8 PM and there will be no formal speaker.    This group meets the second Tuesday of each month from 6:30 to 8 PM and will be held again at [facility] in the [address] [place] [address] (A-B room) when the Covid-19 restrictions are lifted. The group is led by Suresh Hernandez, Ph.D., Licensed Psychologist, [facility] Comprehensive Weight Management Program. There is no cost for group participation and is open to all [facility] (and those external to this program) pre- and post-operative bariatric surgery patients as well as their support system.   _________________________________________________________________________________________________________________________________  [facility] Get Moving Program  Our team of physical therapists is trained to help you understand and take control of your condition. They will perform a thorough evaluation to determine your ability for activity and develop a customized plan to fit your goals and physical ability.  Scheduling: Unsure if the Get Moving program is right for you? Discuss the program with your medical provider or diabetes educator. You can also call us at  [phone] to ask questions or schedule an appointment.   [facility] Get Moving Program  ______________________________________________________________________________________________________________________  [facility] Diabetes Prevention Program (DPP)  If you have prediabetes and Medicare please contact us by MyChart or phone to learn more about our Diabetes Prevention Program  _______________________________________________________________________________________________________________________  Bluetooth Scale:    We hope to provide you with high quality telephone and virtual healthcare visits while social distancing for COVID-19 is necessary, as well as in the future when virtual visits may be more convenient for you.     Our technology team made it possible for Bluetooth scales to send weight measurements to our electronic medical record. This allows weights from you weighing at home to securely flow into the medical record, which will improve telephone and virtual visits.   Additionally, studies have shown that adults actually lose more weight when their weights are automatically sent to someone else, and also that this process is not stressful for those adults.    Below is a link for purchasing the scale, with a discount code for our patients. You may call your insurance company to see if they will reimburse you for the cost of the scale, as a piece of durable medical equipment. The scales only go up to a weight of 400 pounds. This is an issue and we are working with the developer on increasing this. We found no scales that go over 400lb that have blue-tooth for connecting to [employer]Box.    Scale to purchase: the Moda2Ride  Body  Scale: [email or web address]=aw.ds    Discount Code: We have a discount code for our patients to bring the cost down to $50, Discount code is: [place]_Scale_20%off  Thank you,   [facility] Weight Management [facility]

## 2020-11-19 NOTE — NURSING NOTE
"Chief Complaint   Patient presents with     Follow Up     MWM follow up, discuss surgery options       Vitals:    11/19/20 0951   Weight: 139.3 kg (307 lb)   Height: 1.549 m (5' 1\")       Body mass index is 58.01 kg/m .                         "

## 2020-11-19 NOTE — LETTER
"2020       RE: Morgan Mcbride  [address]  [facility] [address]     Dear Colleague,    Thank you for referring your patient, Morgan Mcbride, to the [facility] [place] at [facility]. Please see a copy of my visit note below.    Morgan Mcbride is a 30 year old female who is being evaluated via a billable video visit.      The patient has been notified of following:     \"This video visit will be conducted via a call between you and your physician/provider. We have found that certain health care needs can be provided without the need for an in-person physical exam.  This service lets us provide the care you need with a video conversation.  If a prescription is necessary we can send it directly to your pharmacy.  If lab work is needed we can place an order for that and you can then stop by our lab to have the test done at a later time.    Video visits are billed at different rates depending on your insurance coverage.  Please reach out to your insurance provider with any questions.    If during the course of the call the physician/provider feels a video visit is not appropriate, you will not be charged for this service.\"    Patient has given verbal consent for Video visit? Yes  How would you like to obtain your AVS? MyChart  If you are dropped from the video visit, the video invite should be resent to: Send to e-mail at: [email or web address]  Will anyone else be joining your video visit? No        Video-Visit Details    Type of service:  Video Visit    Video Start Time: 1009  Video End Time: 1036    Originating Location (pt. Location): Home    Distant Location (provider location):  [facility] [place]     Platform used for Video Visit: NIEVES Dee CNP         Return Medical Weight Management Note     Morgan Mcbride  MRN:  [id number]  :  " "1990  BEA:  11/19/2020    Dear Moi Bonilla MD,    I had the pleasure of seeing your patient Morgan Mcbride. She is a 30 year old female who I am continuing to see for treatment of obesity related to:    No flowsheet data found.    INTERVAL HISTORY:  Last seen 6/2019, lost to follow up- this was my first visit with her, had previously worked with [employer] while taking phentermine and topiramate, had not been on any meds in 9 months   RNY 2012, weight regain after pregnancy 2014 (70lb)   Hunger, cravings, caloric beverages, hunger worse in evenings, waking up to eat at night   -we re-tried phentermine but she didn't follow up     Today   Blood pressure: now treated, seeing primary care monthly, well managed with lisinopril   Obesity; Bariatric surgery status:  had lost 23lbs this summer but regained 7lbs while taking depo 2 months ago, frustrated with lack of progress and wants to discuss revision surgery     decreased appetite starting this summer. Unable to eat \"all day\" unless it is something that she is \"craving\".  If tries to eat something other than what she is \"craving\" she will get nauseated and dry heave/ gagging, start \"foam\" will come up with vomiting. no dysphasia or food getting stuck. States these episodies have become more frequent since getting DEPO last month. Craves Mostly fruit and crackers. Experiences excessive hunger at night, will wake up and eat \"a lot\" of crackers or what ever she is craving between 10pm and 2 am.   -had previously had nexplanon but experienced a lot of depression- crying all day   -constipation started with depo  - no BM in the last week, hasn't taken anything for this. Just purchased some miralax.      She doesn't remember liking topiramate - felt it wasn't beneficial and didn't like side effects     CURRENT WEIGHT:   307 lbs 0 oz    Initial Weight (lbs): 367 lbs  Last Visits Weight: 142.3 kg (313 lb 11.4 oz)  Cumulative weight loss (lbs): 60  Weight Loss " "Percentage: 16.35%    Changes and Difficulties 11/19/2020   I have made the following changes to my diet since my last visit: not drinking pop, no more snacking   With regards to my diet, I am still struggling with: depo shot is making me gain weight   I have made the following changes to my activity/exercise since my last visit: -   With regards to my activity/exercise, I am still struggling with: -       VITALS:  Ht 1.549 m (5' 1\")   Wt 139.3 kg (307 lb)   BMI 58.01 kg/m      MEDICATIONS:   Current Outpatient Medications   Medication Sig Dispense Refill     hydrOXYzine (ATARAX) 25 MG tablet Take 25 mg by mouth as needed       lisinopril (ZESTRIL) 5 MG tablet Take 5 mg by mouth       medroxyPROGESTERone (DEPO-PROVERA) 150 MG/ML IM injection        medroxyPROGESTERone (DEPO-PROVERA) 150 MG/ML syringe Inject 150 mg into the muscle       omeprazole (PRILOSEC) 40 MG DR capsule Take 1 capsule (40 mg) by mouth daily 30 capsule 3     polyethylene glycol (MIRALAX) 17 GM/Dose powder Take 17 g (1 capful) by mouth daily 527 g 3     omeprazole (PRILOSEC) 20 MG DR capsule          Weight Loss Medication History Reviewed With Patient 11/19/2020   Which weight loss medications are you currently taking on a regular basis?  None   If you are not taking a weight loss medication that was prescribed to you, please indicate why: Other   Are you having any side effects from the weight loss medication that we have prescribed you? -   If you are having side effects please describe: was on Phentermine and Topiramate, ran out of refills     EGD 4/2019  Findings:       The examined esophagus was normal with gastroesophageal junction at 36cm.       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 Gastrojejunal anastomosis is        healthy-appearing without stricture or ulceration.       The examined jejunum was normal. Biopsies were taken with a cold forceps        for histology. " Estimated blood loss was minimal.    Impressions/Post-Op Diagnosis:       - Normal esophagus.       - Leticia-en-Y gastrojejunostomy.       - Normal examined jejunum. Biopsied    ASSESSMENT:   Patient experiencing nausea and food intolerance which I suspect is related to reflux which she has previously experienced along with gastric ulcers and is not currently taking PPI. She is not describing dysphasia. No symptoms of bleed.     Increased weight and constipation since starting depo. Possible that she is experiencing worsening nausea with Depo secondary to her constipation. This isn't clear and she for sure had the nausea before the Depo shot.     Frustrated with weight. Wants revision for weight loss. Describing irregular eating patterns and possible inadequate nutrition intake given nausea and food intolerances     PLAN:   -RD follow up soon   -omeprazole 40mg daily   -miralax 2 cap fulls daily until BM then daily until bowel movements more regular   -labs when able   -start multivitamin with iron (recent iron deficiency per labs in care everywhere)     Explained that we need to address the nausea and constipation before we can discuss medications for weight loss. We need to be sure she can take food in adequately and get good nutrition before we can start medications that affect her appetite.    Our program does not offer revisions for weight loss routinely. We discussed this again. I explained that we do not see significant additional weight loss with revision surgeries and therefore the risks outweigh the benefits. We recommend medical weight management. Stressed more frequent follow up with myself and dietitian to help work on the dietary and lifestyle changes for weight. Medications for weight loss are only beneficial if they are used along with diet and lifestyle modifications.       FOLLOW-UP:    4 weeks per patient request     Time: 27 min spent on evaluation, management, counseling, education, & motivational  interviewing with greater than 50 % of the total time was spent on counseling and coordinating care    Sincerely,    Fatou Maki NP

## 2020-11-19 NOTE — PROGRESS NOTES
"Morgan Mcbride is a 30 year old female who is being evaluated via a billable video visit.      The patient has been notified of following:     \"This video visit will be conducted via a call between you and your physician/provider. We have found that certain health care needs can be provided without the need for an in-person physical exam.  This service lets us provide the care you need with a video conversation.  If a prescription is necessary we can send it directly to your pharmacy.  If lab work is needed we can place an order for that and you can then stop by our lab to have the test done at a later time.    Video visits are billed at different rates depending on your insurance coverage.  Please reach out to your insurance provider with any questions.    If during the course of the call the physician/provider feels a video visit is not appropriate, you will not be charged for this service.\"    Patient has given verbal consent for Video visit? Yes  How would you like to obtain your AVS? MyChart  If you are dropped from the video visit, the video invite should be resent to: Send to e-mail at: [email or web address]  Will anyone else be joining your video visit? No        Video-Visit Details    Type of service:  Video Visit    Video Start Time: 1009  Video End Time: 1036    Originating Location (pt. Location): Home    Distant Location (provider location):  [facility] [place]     Platform used for Video Visit: NIEVES Dee CNP         Return Medical Weight Management Note     Morgan Mcbride  MRN:  [id number]  :  1990  BEA:  2020    Dear Moi Bonilla MD,    I had the pleasure of seeing your patient Morgan Mcbride. She is a 30 year old female who I am continuing to see for treatment of obesity related to:    No flowsheet data found.    INTERVAL HISTORY:  Last seen 2019, lost to follow up- this was my first visit with her, had " "previously worked with [employer] while taking phentermine and topiramate, had not been on any meds in 9 months   RNY 2012, weight regain after pregnancy 2014 (70lb)   Hunger, cravings, caloric beverages, hunger worse in evenings, waking up to eat at night   -we re-tried phentermine but she didn't follow up     Today   Blood pressure: now treated, seeing primary care monthly, well managed with lisinopril   Obesity; Bariatric surgery status:  had lost 23lbs this summer but regained 7lbs while taking depo 2 months ago, frustrated with lack of progress and wants to discuss revision surgery     decreased appetite starting this summer. Unable to eat \"all day\" unless it is something that she is \"craving\".  If tries to eat something other than what she is \"craving\" she will get nauseated and dry heave/ gagging, start \"foam\" will come up with vomiting. no dysphasia or food getting stuck. States these episodies have become more frequent since getting DEPO last month. Craves Mostly fruit and crackers. Experiences excessive hunger at night, will wake up and eat \"a lot\" of crackers or what ever she is craving between 10pm and 2 am.   -had previously had nexplanon but experienced a lot of depression- crying all day   -constipation started with depo  - no BM in the last week, hasn't taken anything for this. Just purchased some miralax.      She doesn't remember liking topiramate - felt it wasn't beneficial and didn't like side effects     CURRENT WEIGHT:   307 lbs 0 oz    Initial Weight (lbs): 367 lbs  Last Visits Weight: 142.3 kg (313 lb 11.4 oz)  Cumulative weight loss (lbs): 60  Weight Loss Percentage: 16.35%    Changes and Difficulties 11/19/2020   I have made the following changes to my diet since my last visit: not drinking pop, no more snacking   With regards to my diet, I am still struggling with: depo shot is making me gain weight   I have made the following changes to my activity/exercise since my last visit: -   With " "regards to my activity/exercise, I am still struggling with: -       VITALS:  Ht 1.549 m (5' 1\")   Wt 139.3 kg (307 lb)   BMI 58.01 kg/m      MEDICATIONS:   Current Outpatient Medications   Medication Sig Dispense Refill     hydrOXYzine (ATARAX) 25 MG tablet Take 25 mg by mouth as needed       lisinopril (ZESTRIL) 5 MG tablet Take 5 mg by mouth       medroxyPROGESTERone (DEPO-PROVERA) 150 MG/ML IM injection        medroxyPROGESTERone (DEPO-PROVERA) 150 MG/ML syringe Inject 150 mg into the muscle       omeprazole (PRILOSEC) 40 MG DR capsule Take 1 capsule (40 mg) by mouth daily 30 capsule 3     polyethylene glycol (MIRALAX) 17 GM/Dose powder Take 17 g (1 capful) by mouth daily 527 g 3     omeprazole (PRILOSEC) 20 MG DR capsule          Weight Loss Medication History Reviewed With Patient 11/19/2020   Which weight loss medications are you currently taking on a regular basis?  None   If you are not taking a weight loss medication that was prescribed to you, please indicate why: Other   Are you having any side effects from the weight loss medication that we have prescribed you? -   If you are having side effects please describe: was on Phentermine and Topiramate, ran out of refills     EGD 4/2019  Findings:       The examined esophagus was normal with gastroesophageal junction at 36cm.       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 Gastrojejunal anastomosis is        healthy-appearing without stricture or ulceration.       The examined jejunum was normal. Biopsies were taken with a cold forceps        for histology. Estimated blood loss was minimal.    Impressions/Post-Op Diagnosis:       - Normal esophagus.       - Leticia-en-Y gastrojejunostomy.       - Normal examined jejunum. Biopsied    ASSESSMENT:   Patient experiencing nausea and food intolerance which I suspect is related to reflux which she has previously experienced along with gastric ulcers " and is not currently taking PPI. She is not describing dysphasia. No symptoms of bleed.     Increased weight and constipation since starting depo. Possible that she is experiencing worsening nausea with Depo secondary to her constipation. This isn't clear and she for sure had the nausea before the Depo shot.     Frustrated with weight. Wants revision for weight loss. Describing irregular eating patterns and possible inadequate nutrition intake given nausea and food intolerances     PLAN:   -RD follow up soon   -omeprazole 40mg daily   -miralax 2 cap fulls daily until BM then daily until bowel movements more regular   -labs when able   -start multivitamin with iron (recent iron deficiency per labs in care everywhere)     Explained that we need to address the nausea and constipation before we can discuss medications for weight loss. We need to be sure she can take food in adequately and get good nutrition before we can start medications that affect her appetite.    Our program does not offer revisions for weight loss routinely. We discussed this again. I explained that we do not see significant additional weight loss with revision surgeries and therefore the risks outweigh the benefits. We recommend medical weight management. Stressed more frequent follow up with myself and dietitian to help work on the dietary and lifestyle changes for weight. Medications for weight loss are only beneficial if they are used along with diet and lifestyle modifications.       FOLLOW-UP:    4 weeks per patient request     Time: 27 min spent on evaluation, management, counseling, education, & motivational interviewing with greater than 50 % of the total time was spent on counseling and coordinating care    Sincerely,    Fatou Maki NP

## 2020-11-23 ENCOUNTER — TELEPHONE (OUTPATIENT)
Dept: ENDOCRINOLOGY | Facility: CLINIC | Age: 30
End: 2020-11-23

## 2020-11-23 NOTE — TELEPHONE ENCOUNTER
Called patient to discuss her C survey and comments. Requested a callback at her earliest convenience.     ZULY Jorge BSN  [facility] and    [facility] Supervisor   [phone]

## 2020-11-29 ENCOUNTER — HEALTH MAINTENANCE LETTER (OUTPATIENT)
Age: 30
End: 2020-11-29

## 2020-12-01 NOTE — TELEPHONE ENCOUNTER
Called patient to discuss her C survey and comments. Requested a callback at her earliest convenience.     ZULY Jorge BSN  Sur[facility] Clinic and    [facility] Supervisor   [phone]

## 2020-12-08 NOTE — TELEPHONE ENCOUNTER
Called patient regarding her [facility] survey comments. Patient states she felt she was over speaking her and she was not able to provide updates as to where she was at in her weight loss journey. Patient states she would like to follow up with a different provider. Patient is scheduled for her return visit with Jonelle on 12/14.

## 2020-12-16 ENCOUNTER — VIRTUAL VISIT (OUTPATIENT)
Dept: ENDOCRINOLOGY | Facility: CLINIC | Age: 30
End: 2020-12-16
Payer: COMMERCIAL

## 2020-12-16 VITALS — BODY MASS INDEX: 55.32 KG/M2 | WEIGHT: 293 LBS | HEIGHT: 61 IN

## 2020-12-16 DIAGNOSIS — E66.813 CLASS 3 DRUG-INDUCED OBESITY WITH SERIOUS COMORBIDITY AND BODY MASS INDEX (BMI) OF 50.0 TO 59.9 IN ADULT (H): Primary | ICD-10-CM

## 2020-12-16 DIAGNOSIS — E66.1 CLASS 3 DRUG-INDUCED OBESITY WITH SERIOUS COMORBIDITY AND BODY MASS INDEX (BMI) OF 50.0 TO 59.9 IN ADULT (H): Primary | ICD-10-CM

## 2020-12-16 PROCEDURE — 99215 OFFICE O/P EST HI 40 MIN: CPT | Mod: 95 | Performed by: PHYSICIAN ASSISTANT

## 2020-12-16 RX ORDER — BUPROPION HYDROCHLORIDE 100 MG/1
100 TABLET, EXTENDED RELEASE ORAL 2 TIMES DAILY
Qty: 60 TABLET | Refills: 0 | Status: SHIPPED | OUTPATIENT
Start: 2020-12-16 | End: 2021-07-19

## 2020-12-16 RX ORDER — NALTREXONE HYDROCHLORIDE 50 MG/1
TABLET, FILM COATED ORAL
Qty: 30 TABLET | Refills: 1 | Status: SHIPPED | OUTPATIENT
Start: 2020-12-16 | End: 2021-07-22

## 2020-12-16 ASSESSMENT — MIFFLIN-ST. JEOR: SCORE: 2063.53

## 2020-12-16 ASSESSMENT — PAIN SCALES - GENERAL: PAINLEVEL: NO PAIN (0)

## 2020-12-16 NOTE — PROGRESS NOTES
"Morgan Mcbride is a 30 year old female who is being evaluated via a billable video visit.      The patient has been notified of following:     \"This video visit will be conducted via a call between you and your physician/provider. We have found that certain health care needs can be provided without the need for an in-person physical exam.  This service lets us provide the care you need with a video conversation.  If a prescription is necessary we can send it directly to your pharmacy.  If lab work is needed we can place an order for that and you can then stop by our lab to have the test done at a later time.    Video visits are billed at different rates depending on your insurance coverage.  Please reach out to your insurance provider with any questions.    If during the course of the call the physician/provider feels a video visit is not appropriate, you will not be charged for this service.\"    Patient has given verbal consent for Video visit? Yes  How would you like to obtain your AVS? MyChart  If you are dropped from the video visit, the video invite should be resent to: Send to e-mail at: [email or web address]  Will anyone else be joining your video visit? No    During this virtual visit the patient is located in [place], patient verifies this as the location during the entirety of this visit.         Return Medical Weight Management Note     Morgan Mcbride  MRN:  [id number]  :  1990  BEA:  2020    Dear Moi Bonilla MD,    I had the pleasure of seeing your patient Morgan Mcbride. She is a 30 year old female who I am continuing to see for treatment of obesity related to:       2020   I have the following health issues associated with obesity: High Blood Pressure       INTERVAL HISTORY:  RNY 2012     Pregnancy 2014 with 70lb weight gain  Last seen by Dr. Espinosa 4/15/2017. Had worked with [facility] from  to  and lost 85lbs at the time of that visit. Was taking 1/2 tab of " "phentermine twice daily and topiramate was being encouraged 75mg (took 1 full tab of phentermine instead of 1/2 twice daily- felt more effective)     She has stopped drinking soda completely.    She doesn't remember liking topiramate - felt it wasn't beneficial and didn't like side effects   She tolerated phentermine but didn't feel it was effective.  She took phentermine and topiramate over 2 years ago.    Up 3 lbs since last visit  Last visit given omeprazole for GERD and miralax for constipation  Las ordered but not done yet    1 years ago she was having abdominal pain and had EGD and colonoscopy for epigastric burning pain and diarrhea  She stopped taking NSAIDS  EGD was normal   She was taking omeprazole 40mg daily but then increased to twice daily which is controlling it.  Constipation: takes miralax but feels it didn't work only tried for 2 days. Last BM  Nausea improved since starting PPI BID  HTN was taking lisinopril but off of it now because.  Getting it checked again today  No narcotics       CURRENT WEIGHT:   310 lbs 0 oz    Initial Weight (lbs): 367 lbs  Last Visits Weight: 139.3 kg (307 lb)  Cumulative weight loss (lbs): 57  Weight Loss Percentage: 15.53%    Changes and Difficulties 12/14/2020   I have made the following changes to my diet since my last visit: N/A   With regards to my diet, I am still struggling with: Losing weight   I have made the following changes to my activity/exercise since my last visit: I have stop drinking soda completely and do not eat out as I did   With regards to my activity/exercise, I am still struggling with: Having energy       VITALS:  Ht 1.549 m (5' 1\")   Wt 140.6 kg (310 lb)   BMI 58.57 kg/m      MEDICATIONS:   Current Outpatient Medications   Medication Sig Dispense Refill     hydrOXYzine (ATARAX) 25 MG tablet Take 25 mg by mouth as needed       lisinopril (ZESTRIL) 5 MG tablet Take 5 mg by mouth       medroxyPROGESTERone (DEPO-PROVERA) 150 MG/ML syringe Inject " 150 mg into the muscle       omeprazole (PRILOSEC) 40 MG DR capsule Take 1 capsule (40 mg) by mouth daily 30 capsule 3     polyethylene glycol (MIRALAX) 17 GM/Dose powder Take 17 g (1 capful) by mouth daily 527 g 3     medroxyPROGESTERone (DEPO-PROVERA) 150 MG/ML IM injection        omeprazole (PRILOSEC) 20 MG DR capsule          Weight Loss Medication History Reviewed With Patient 12/14/2020   Which weight loss medications are you currently taking on a regular basis?  None   If you are not taking a weight loss medication that was prescribed to you, please indicate why: Other   Are you having any side effects from the weight loss medication that we have prescribed you? No   If you are having side effects please describe: -     EGD 4/2019  Findings:       The examined esophagus was normal with gastroesophageal junction at 36cm.       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 Gastrojejunal anastomosis is        healthy-appearing without stricture or ulceration.       The examined jejunum was normal. Biopsies were taken with a cold forceps        for histology. Estimated blood loss was minimal.    Impressions/Post-Op Diagnosis:       - Normal esophagus.       - Leticia-en-Y gastrojejunostomy.       - Normal examined jejunum. Biopsied    ASSESSMENT/PLAN: [facility] follow up.  Hx of RYGB with some weight regain.  She has hx of working with multiple medical weight management providers since 2012.  She lost some loss (20 lbs) with phentermine/topiramate but felt their effectiveness stopped. She is most interested in revisional surgery of gastric bypass or duodenal switch.  We discussed that I agree with the similar recommendation given at her last visit with Fatou Maki that surgical revision would not be recommended or indicated.  The possible benefit would not outweigh the risk and the amount of weight loss can possibly be achieved with medications.  She was upset  at first with this recommendation and she may seek out a second opinion from another surgical program.  She was open to discussing medical weight management options in the meantime.  She does not want to retrial phentermine/topiramate.        Start bupropion/naltrexone, sent to pharmacy  Labs including A1C.  Last A1C was 5.6 so she is very close to preDM  Discussed that medication options such as Contrave, GLP1 could worsen her GI symptoms.  GERD and nausea under better control now since increasing omeprazole to 40 BID  Follow up in 1 month with [facility] pharmacist Lauren Bloch by phone to follow up bupropion/naltrexone start and consider GLP1 if A1C in preDM range.  Bariatric labs ordered last month to be done at her clinic.  Follow up in 1 month with dietitian  Follow up in 3 months with Jonelle          Sincerely,    Jonelle Villeda PA-C    Video-Visit Details    Type of service:  Video Visit    Video Start Time: 1003  Video End Time: 1055    Originating Location (pt. Location): Home    Distant Location (provider location):  [facility]NA[facility] [place]     Platform used for Video Visit: Cheko Villeda PA-C

## 2020-12-16 NOTE — Clinical Note
Mary Melara and LASHONDA Montero about this pt.  She is s/p RYGB with some weight regain. She has been working with [facility] since 2012, Fatou Jackson and maybe others.  Fatou saw her last month and told her no to revision so she graded their visit badly and Ania called her and scheduled with me.  She wast not happy when I agreed about revision so she may seek out another opinion on this from a different program.  She did settle down and listen to me about considering other options.  She has tried phen/top and lost some weight in the past but insisted it didn't work and didn't want to retrial.  I started bupropion/naltrexone today and ordered labs.  If A1C preDM could discuss GLP1.  She is likely in the mindset that nothing is going to work and told me over and over that she has always been able to lose weight and now she is trying and can't lose at all.  She reported to Fatou snacking and cravings but completed denied to me.  Just FYI so you know what to expect as she is going to follow up Saritha/FELICIA 1 mo

## 2020-12-16 NOTE — NURSING NOTE
"Chief Complaint   Patient presents with     Follow Up     [facility] follow up       Vitals:    12/16/20 0948   Weight: 140.6 kg (310 lb)   Height: 1.549 m (5' 1\")       Body mass index is 58.57 kg/m .                            "

## 2020-12-16 NOTE — LETTER
"2020       RE: Morgan Mcbride  [address]  [place] [address]     Dear Colleague,    Thank you for referring your patient, Morgan Mcbride, to the [facility] [place] at [facility]. Please see a copy of my visit note below.    Morgan Mcbride is a 30 year old female who is being evaluated via a billable video visit.      The patient has been notified of following:     \"This video visit will be conducted via a call between you and your physician/provider. We have found that certain health care needs can be provided without the need for an in-person physical exam.  This service lets us provide the care you need with a video conversation.  If a prescription is necessary we can send it directly to your pharmacy.  If lab work is needed we can place an order for that and you can then stop by our lab to have the test done at a later time.    Video visits are billed at different rates depending on your insurance coverage.  Please reach out to your insurance provider with any questions.    If during the course of the call the physician/provider feels a video visit is not appropriate, you will not be charged for this service.\"    Patient has given verbal consent for Video visit? Yes  How would you like to obtain your AVS? MyChart  If you are dropped from the video visit, the video invite should be resent to: Send to e-mail at: [email or web address]  Will anyone else be joining your video visit? No    During this virtual visit the patient is located in [place], patient verifies this as the location during the entirety of this visit.         Return Medical Weight Management Note     Morgan Mcbride  MRN:  [id number]  :  1990  BEA:  2020    Dear Moi Bonilla MD,    I had the pleasure of seeing your patient Morgan Mcbride. She is a 30 year old female who I am continuing to see for treatment of " obesity related to:       12/12/2020   I have the following health issues associated with obesity: High Blood Pressure       INTERVAL HISTORY:  RNY 2012     Pregnancy 2014 with 70lb weight gain  Last seen by Dr. Espinosa 4/15/2017. Had worked with [facility] from 2012 to 2017 and lost 85lbs at the time of that visit. Was taking 1/2 tab of phentermine twice daily and topiramate was being encouraged 75mg (took 1 full tab of phentermine instead of 1/2 twice daily- felt more effective)     She has stopped drinking soda completely.    She doesn't remember liking topiramate - felt it wasn't beneficial and didn't like side effects   She tolerated phentermine but didn't feel it was effective.  She took phentermine and topiramate over 2 years ago.    Up 3 lbs since last visit  Last visit given omeprazole for GERD and miralax for constipation  Las ordered but not done yet    1 years ago she was having abdominal pain and had EGD and colonoscopy for epigastric burning pain and diarrhea  She stopped taking NSAIDS  EGD was normal   She was taking omeprazole 40mg daily but then increased to twice daily which is controlling it.  Constipation: takes miralax but feels it didn't work only tried for 2 days. Last BM  Nausea improved since starting PPI BID  HTN was taking lisinopril but off of it now because.  Getting it checked again today  No narcotics       CURRENT WEIGHT:   310 lbs 0 oz    Initial Weight (lbs): 367 lbs  Last Visits Weight: 139.3 kg (307 lb)  Cumulative weight loss (lbs): 57  Weight Loss Percentage: 15.53%    Changes and Difficulties 12/14/2020   I have made the following changes to my diet since my last visit: N/A   With regards to my diet, I am still struggling with: Losing weight   I have made the following changes to my activity/exercise since my last visit: I have stop drinking soda completely and do not eat out as I did   With regards to my activity/exercise, I am still struggling with: Having energy  "      VITALS:  Ht 1.549 m (5' 1\")   Wt 140.6 kg (310 lb)   BMI 58.57 kg/m      MEDICATIONS:   Current Outpatient Medications   Medication Sig Dispense Refill     hydrOXYzine (ATARAX) 25 MG tablet Take 25 mg by mouth as needed       lisinopril (ZESTRIL) 5 MG tablet Take 5 mg by mouth       medroxyPROGESTERone (DEPO-PROVERA) 150 MG/ML syringe Inject 150 mg into the muscle       omeprazole (PRILOSEC) 40 MG DR capsule Take 1 capsule (40 mg) by mouth daily 30 capsule 3     polyethylene glycol (MIRALAX) 17 GM/Dose powder Take 17 g (1 capful) by mouth daily 527 g 3     medroxyPROGESTERone (DEPO-PROVERA) 150 MG/ML IM injection        omeprazole (PRILOSEC) 20 MG DR capsule          Weight Loss Medication History Reviewed With Patient 12/14/2020   Which weight loss medications are you currently taking on a regular basis?  None   If you are not taking a weight loss medication that was prescribed to you, please indicate why: Other   Are you having any side effects from the weight loss medication that we have prescribed you? No   If you are having side effects please describe: -     EGD 4/2019  Findings:       The examined esophagus was normal with gastroesophageal junction at 36cm.       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 Gastrojejunal anastomosis is        healthy-appearing without stricture or ulceration.       The examined jejunum was normal. Biopsies were taken with a cold forceps        for histology. Estimated blood loss was minimal.    Impressions/Post-Op Diagnosis:       - Normal esophagus.       - Leticia-en-Y gastrojejunostomy.       - Normal examined jejunum. Biopsied    ASSESSMENT/PLAN: [facility] follow up.  Hx of RYGB with some weight regain.  She has hx of working with multiple medical weight management providers since 2012.  She lost some loss (20 lbs) with phentermine/topiramate but felt their effectiveness stopped. She is most interested in " revisional surgery of gastric bypass or duodenal switch.  We discussed that I agree with the similar recommendation given at her last visit with Fatou Maki that surgical revision would not be recommended or indicated.  The possible benefit would not outweigh the risk and the amount of weight loss can possibly be achieved with medications.  She was upset at first with this recommendation and she may seek out a second opinion from another surgical program.  She was open to discussing medical weight management options in the meantime.  She does not want to retrial phentermine/topiramate.        Start bupropion/naltrexone, sent to pharmacy  Labs including A1C.  Last A1C was 5.6 so she is very close to preDM  Discussed that medication options such as Contrave, GLP1 could worsen her GI symptoms.  GERD and nausea under better control now since increasing omeprazole to 40 BID  Follow up in 1 month with [facility] pharmacist Lauren Bloch by phone to follow up bupropion/naltrexone start and consider GLP1 if A1C in preDM range.  Bariatric labs ordered last month to be done at her clinic.  Follow up in 1 month with dietitian  Follow up in 3 months with Jonelle          Sincerely,    Jonelle Villeda PA-C    Video-Visit Details    Type of service:  Video Visit    Video Start Time: 1003  Video End Time: 1055    Originating Location (pt. Location): Home    Distant Location (provider location):  [facility] [place]     Platform used for Video Visit: Cheko Villeda PA-C

## 2020-12-16 NOTE — Clinical Note
Can you fax korin's lab order from last month to [facility]?  She has appt today at 2pm Thanks

## 2021-01-15 ENCOUNTER — THERAPY VISIT (OUTPATIENT)
Dept: PHYSICAL THERAPY | Facility: CLINIC | Age: 31
End: 2021-01-15
Payer: COMMERCIAL

## 2021-01-15 DIAGNOSIS — M54.50 ACUTE BILATERAL LOW BACK PAIN WITHOUT SCIATICA: ICD-10-CM

## 2021-01-15 PROCEDURE — 97161 PT EVAL LOW COMPLEX 20 MIN: CPT | Mod: GP | Performed by: PHYSICAL THERAPIST

## 2021-01-15 PROCEDURE — 97112 NEUROMUSCULAR REEDUCATION: CPT | Mod: GP | Performed by: PHYSICAL THERAPIST

## 2021-01-15 NOTE — PROGRESS NOTES
[facility] Initial Evaluation  Subjective:  The history is provided by the patient. No  was used.   Therapist Generated HPI Evaluation  Problem details:  I fell down the stairs at home a couple of weeks ago and landed on my back and bent my knee backwards. I felt a sharp pain in the low back and went into both legs.  Pain in the back comes and goes.  I also have nadir horse in the low back.  Last weekend I went to the emergency room on last Saturday due to back pain and urinary spasms. Sunday I went back to ER due to bleeding.  I found out I am pregnant 16 weeks..         Type of problem:  Lumbar.    This is a new condition.  Condition occurred with:  A fall/slip.  Where condition occurred: at home.  Patient reports pain:  Lumbar spine left, lumbar spine right, central lumbar spine and lower lumbar spine.  Pain is described as burning and sharp (spasms) and is intermittent.  Pain radiates to:  No radiation. Pain is worse in the P.M. and worse during the night.  Since onset symptoms are unchanged.  Symptoms are exacerbated by walking (lying down)  and relieved by heat.  Imaging testing: pregnancy US.  There was none improvement following previous treatment.  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  Home/work barriers: townhhouse, lot of stairs 6 1/2 year old.    Patient Health History  Morgan Mcbride being seen for low back pain .     Problem began: 1/6/2021 (MD appointment).      Pain is reported as 8/10 (when touching) on pain scale.  General health as reported by patient is good.  Pertinent medical history includes: high blood pressure and overweight (pregnant).   Red flags:  None as reported by patient.      Other surgery history details: 2011 gastric bypass.    Current medications:  None. Other medications details: prenatal vitamins.    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   Primary job tasks include:  Computer work, prolonged sitting, prolonged standing, pushing/pulling, lifting/carrying and repetitive tasks.                                    Objective:  Standing Alignment:    Cervical/Thoracic:  Forward head (poor sitting posture)  Shoulder/UE:  Rounded shoulders  Lumbar:  Lordosis incr and anterior pelvic tilt      Knee:  Genu valgus R and genu valgus L  Ankle/Foot:  Pes planus R and pes planus L    Gait:      Deviations:  Hip:  Decr dynamic control L and decr dynamic control RAnkle:  Pronation incr R and pronation incr LGeneral Deviations:  Base of support decr    Flexibility/Screens:   Positive screens:  Lumbar                   Lumbar/SI Evaluation  ROM:    AROM Lumbar:   Flexion:        Ankle with reps increase in back pain   Ext:                    WFL, pain at end range   Side Bend:        Left:     Right:   Rotation:           Left:     Right:   Side Glide:        Left:     Right:         Strength: Tranverse Adonimal 1-/5  Lumbar Myotomes:    T12-L3 (Hip Flex):  Left: 5    Right: 5  L2-4 (Quads):  Left:  5    Right:  5  L4 (Ankle DF):  Left:  5    Right:  5  L5 (Great Toe Ext): Left: 5-    Right: 5-           Neural Tension/Mobility:      Left side:Slump  negative.     Right side:   Slump  negative.   Lumbar Palpation:  not assessed      Functional Tests:  Core strength and proprioception lumbar: balance left 3-5 sec, right 0 sec                                                          General     ROS    Assessment/Plan:    Patient is a 30 year old female with lumbar complaints.    Patient has the following significant findings with corresponding treatment plan.                Diagnosis 1:  Low back pain   Pain -  manual therapy, self management, education, directional preference exercise and home program  Decreased strength - therapeutic exercise, therapeutic activities and home program  Impaired balance - neuro re-education, therapeutic activities  and home program  Impaired gait - gait training and home program  Impaired muscle performance - neuro re-education and home program  Decreased function - therapeutic activities and home program  Impaired posture - neuro re-education, therapeutic activities and home program  Evaluation ongoing.  Continue to evaluate patient's ability to return to work     Therapy Evaluation Codes:   Cumulative Therapy Evaluation is: Low complexity.    Previous and current functional limitations:  (See Goal Flow Sheet for this information)    Short term and Long term goals: (See Goal Flow Sheet for this information)     Communication ability:  Patient appears to be able to clearly communicate and understand verbal and written communication and follow directions correctly.  Treatment Explanation - The following has been discussed with the patient:   RX ordered/plan of care  This patient would benefit from PT intervention to resume normal activities.   Rehab potential is good.    Frequency:  1 X week, once daily  Duration:  for 6 weeks  Discharge Plan:  Achieve all LTG.  Independent in home treatment program.    Please refer to the daily flowsheet for treatment today, total treatment time and time spent performing 1:1 timed codes.

## 2021-01-15 NOTE — LETTER
[facility] ATHLETIC [facility] PHYSICAL THERAPY  [address]  [facility] [address]  [phone]  2021  Re: Morgan Mcbride   :   1990  MRN:  [id number]   REFERRING PHYSICIAN:   Linh Harris    [facility] ATHLETIC [facility] PHYSICAL THERAPY  Date of Initial Evaluation:  1/15/21  Visits:  Rxs Used: 1  Reason for Referral:  Acute bilateral low back pain without sciatica    EVALUATION SUMMARY  [facility] Athletic [facility] Initial Evaluation  Subjective:  The history is provided by the patient. No  was used.   Therapist Generated HPI Evaluation  Problem details:  I fell down the stairs at home a couple of weeks ago and landed on my back and bent my knee backwards. I felt a sharp pain in the low back and went into both legs.  Pain in the back comes and goes.  I also have nadir horse in the low back.  Last weekend I went to the emergency room on last Saturday due to back pain and urinary spasms.  I went back to ER due to bleeding.  I found out I am pregnant 16 weeks..         Type of problem:  Lumbar.    This is a new condition.  Condition occurred with:  A fall/slip.  Where condition occurred: at home.  Patient reports pain:  Lumbar spine left, lumbar spine right, central lumbar spine and lower lumbar spine.  Pain is described as burning and sharp (spasms) and is intermittent.  Pain radiates to:  No radiation. Pain is worse in the P.M. and worse during the night.  Since onset symptoms are unchanged.  Symptoms are exacerbated by walking (lying down)  and relieved by heat.  Imaging testing: pregnancy US.  There was none improvement following previous treatment.  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  Home/work barriers: townhhouse, lot of stairs 6 1/2 year old.    Patient Health History  Morgan Mcbride  being seen for low back pain .   Problem began: 2021 (MD appointment).   Pain is reported as 8/10 (when touching) on pain scale.  General health as reported by patient is good.  Pertinent medical history includes: high blood pressure and overweight (pregnant).   Red flags:  None as reported by patient.   Other surgery history details:  gastric bypass.    Current medications:  None. Other medications details: prenatal vitamins.    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   Primary job tasks include:  Computer work, prolonged sitting, prolonged standing, pushing/pulling, lifting/carrying and repetitive tasks.                 Objective:  Standing Alignment:    Cervical/Thoracic:  Forward head (poor sitting posture)  Shoulder/UE:  Rounded shoulders  Lumbar:  Lordosis incr and anterior pelvic tilt  Knee:  Genu valgus R and genu valgus L  Ankle/Foot:  Pes planus R and pes planus L  Gait:    Deviations:  Hip:  Decr dynamic control L and decr dynamic control RAnkle:  Pronation incr R and pronation incr LGeneral Deviations:  Base of support decr  Flexibility/Screens:   Positive screens:  Lumbar  Re: Morgan Mcbride   :   1990    Lumbar/SI Evaluation  ROM:    AROM Lumbar:   Flexion:        Ankle with reps increase in back pain   Ext:                    WFL, pain at end range   Strength: Tranverse Adonimal 1-/5  Lumbar Myotomes:    T12-L3 (Hip Flex):  Left: 5    Right: 5  L2-4 (Quads):  Left:  5    Right:  5  L4 (Ankle DF):  Left:  5    Right:  5  L5 (Great Toe Ext): Left: 5-    Right: 5-   Neural Tension/Mobility:    Left side:Slump  negative.   Right side:   Slump  negative.   Lumbar Palpation:  not assessed  Functional Tests:  Core strength and proprioception lumbar: balance left 3-5 sec, right 0 sec     Assessment/Plan:    Patient is a 30 year old female with lumbar complaints.    Patient has the following significant findings with corresponding treatment plan.                 Diagnosis 1:  Low back pain   Pain -  manual therapy, self management, education, directional preference exercise and home program  Decreased strength - therapeutic exercise, therapeutic activities and home program  Impaired balance - neuro re-education, therapeutic activities and home program  Impaired gait - gait training and home program  Impaired muscle performance - neuro re-education and home program  Decreased function - therapeutic activities and home program  Impaired posture - neuro re-education, therapeutic activities and home program  Evaluation ongoing.  Continue to evaluate patient's ability to return to work     Therapy Evaluation Codes:   Cumulative Therapy Evaluation is: Low complexity.  Previous and current functional limitations:  (See Goal Flow Sheet for this information)    Short term and Long term goals: (See Goal Flow Sheet for this information)   Communication ability:  Patient appears to be able to clearly communicate and understand verbal and written communication and follow directions correctly.  Treatment Explanation - The following has been discussed with the patient:   RX ordered/plan of care  This patient would benefit from PT intervention to resume normal activities.   Rehab potential is good.    Frequency:  1 X week, once daily  Duration:  for 6 weeks  Discharge Plan:  Achieve all LTG.  Independent in home treatment program.      Thank you for your referral.    INQUIRIES  Therapist: Deb Zuniga, PT  [facility] PHYSICAL THERAPY  [address] [address]  Phone: [phone]  Fax: [phone]

## 2021-01-19 ENCOUNTER — TELEPHONE (OUTPATIENT)
Dept: ENDOCRINOLOGY | Facility: CLINIC | Age: 31
End: 2021-01-19

## 2021-01-19 NOTE — TELEPHONE ENCOUNTER
Pt no showed for today's video visit. Rescheduled from last week per pt request. Sent link to join video via text to cell. Called pt cell x2, no answer and VM full, unable to leave a VM. Sent pt a [employer] message. Pt never joined video. Closed video at 1:19 pm. Pt will need to reschedule.     Kylah Christian, RD, LD

## 2021-05-28 ENCOUNTER — MEDICAL CORRESPONDENCE (OUTPATIENT)
Dept: HEALTH INFORMATION MANAGEMENT | Facility: CLINIC | Age: 31
End: 2021-05-28

## 2021-05-29 ENCOUNTER — HOSPITAL ENCOUNTER (OUTPATIENT)
Facility: CLINIC | Age: 31
End: 2021-05-29
Attending: OBSTETRICS & GYNECOLOGY | Admitting: OBSTETRICS & GYNECOLOGY
Payer: COMMERCIAL

## 2021-05-30 ENCOUNTER — HOSPITAL ENCOUNTER (INPATIENT)
Facility: CLINIC | Age: 31
LOS: 4 days | Discharge: HOME | End: 2021-06-03
Attending: OBSTETRICS & GYNECOLOGY | Admitting: OBSTETRICS & GYNECOLOGY
Payer: COMMERCIAL

## 2021-05-30 ENCOUNTER — ANESTHESIA (OUTPATIENT)
Dept: OBGYN | Facility: CLINIC | Age: 31
End: 2021-05-30
Payer: COMMERCIAL

## 2021-05-30 ENCOUNTER — TRANSFERRED RECORDS (OUTPATIENT)
Dept: HEALTH INFORMATION MANAGEMENT | Facility: CLINIC | Age: 31
End: 2021-05-30

## 2021-05-30 ENCOUNTER — ANESTHESIA EVENT (OUTPATIENT)
Dept: OBGYN | Facility: CLINIC | Age: 31
End: 2021-05-30
Payer: COMMERCIAL

## 2021-05-30 ENCOUNTER — APPOINTMENT (OUTPATIENT)
Dept: CT IMAGING | Facility: CLINIC | Age: 31
End: 2021-05-30
Attending: OBSTETRICS & GYNECOLOGY
Payer: COMMERCIAL

## 2021-05-30 DIAGNOSIS — E55.9 VITAMIN D DEFICIENCY: ICD-10-CM

## 2021-05-30 DIAGNOSIS — Z34.90 ENCOUNTER FOR INDUCTION OF LABOR: Primary | ICD-10-CM

## 2021-05-30 DIAGNOSIS — I10 ESSENTIAL HYPERTENSION: ICD-10-CM

## 2021-05-30 DIAGNOSIS — Z30.42 DEPO-PROVERA CONTRACEPTIVE STATUS: ICD-10-CM

## 2021-05-30 DIAGNOSIS — E66.01 OBESITY, MORBID (H): ICD-10-CM

## 2021-05-30 DIAGNOSIS — D51.0 VITAMIN B12 DEFICIENCY ANEMIA DUE TO INTRINSIC FACTOR DEFICIENCY: ICD-10-CM

## 2021-05-30 DIAGNOSIS — R73.03 PREDIABETES: ICD-10-CM

## 2021-05-30 DIAGNOSIS — Z98.84 GASTRIC BYPASS STATUS FOR OBESITY: ICD-10-CM

## 2021-05-30 DIAGNOSIS — M54.50 ACUTE BILATERAL LOW BACK PAIN WITHOUT SCIATICA: ICD-10-CM

## 2021-05-30 DIAGNOSIS — Z82.49 FAMILY HISTORY OF THROMBOSIS OR EMBOLISM: ICD-10-CM

## 2021-05-30 DIAGNOSIS — R10.13 EPIGASTRIC ABDOMINAL PAIN: ICD-10-CM

## 2021-05-30 LAB
ABO + RH BLD: NORMAL
ABO + RH BLD: NORMAL
ALBUMIN SERPL-MCNC: 2.7 G/DL (ref 3.4–5)
ALP SERPL-CCNC: 125 U/L (ref 40–150)
ALT SERPL W P-5'-P-CCNC: 14 U/L (ref 0–50)
ANION GAP SERPL CALCULATED.3IONS-SCNC: 9 MMOL/L (ref 3–14)
AST SERPL W P-5'-P-CCNC: 17 U/L (ref 0–45)
BASOPHILS # BLD AUTO: 0.1 10E9/L (ref 0–0.2)
BASOPHILS NFR BLD AUTO: 0.3 %
BILIRUB SERPL-MCNC: 0.2 MG/DL (ref 0.2–1.3)
BLD GP AB SCN SERPL QL: NORMAL
BLOOD BANK CMNT PATIENT-IMP: NORMAL
BUN SERPL-MCNC: 5 MG/DL (ref 7–30)
CALCIUM SERPL-MCNC: 8.9 MG/DL (ref 8.5–10.1)
CHLORIDE SERPL-SCNC: 107 MMOL/L (ref 94–109)
CO2 SERPL-SCNC: 22 MMOL/L (ref 20–32)
CREAT SERPL-MCNC: 0.62 MG/DL (ref 0.52–1.04)
CREAT UR-MCNC: 46 MG/DL
DIFFERENTIAL METHOD BLD: ABNORMAL
EOSINOPHIL # BLD AUTO: 0.1 10E9/L (ref 0–0.7)
EOSINOPHIL NFR BLD AUTO: 0.3 %
ERYTHROCYTE [DISTWIDTH] IN BLOOD BY AUTOMATED COUNT: 14.7 % (ref 10–15)
GFR SERPL CREATININE-BSD FRML MDRD: >90 ML/MIN/{1.73_M2}
GLUCOSE SERPL-MCNC: 91 MG/DL (ref 70–99)
HCT VFR BLD AUTO: 30.2 % (ref 35–47)
HGB BLD-MCNC: 9.8 G/DL (ref 11.7–15.7)
IMM GRANULOCYTES # BLD: 0.7 10E9/L (ref 0–0.4)
IMM GRANULOCYTES NFR BLD: 3.2 %
LABORATORY COMMENT REPORT: NORMAL
LYMPHOCYTES # BLD AUTO: 3 10E9/L (ref 0.8–5.3)
LYMPHOCYTES NFR BLD AUTO: 14.5 %
MCH RBC QN AUTO: 28.5 PG (ref 26.5–33)
MCHC RBC AUTO-ENTMCNC: 32.5 G/DL (ref 31.5–36.5)
MCV RBC AUTO: 88 FL (ref 78–100)
MONOCYTES # BLD AUTO: 2 10E9/L (ref 0–1.3)
MONOCYTES NFR BLD AUTO: 9.9 %
NEUTROPHILS # BLD AUTO: 14.6 10E9/L (ref 1.6–8.3)
NEUTROPHILS NFR BLD AUTO: 71.8 %
NRBC # BLD AUTO: 0.2 10*3/UL
NRBC BLD AUTO-RTO: 1 /100
PLATELET # BLD AUTO: 414 10E9/L (ref 150–450)
POTASSIUM SERPL-SCNC: 4 MMOL/L (ref 3.4–5.3)
PROT SERPL-MCNC: 6.9 G/DL (ref 6.8–8.8)
PROT UR-MCNC: 0.1 G/L
PROT/CREAT 24H UR: 0.21 G/G CR (ref 0–0.2)
RBC # BLD AUTO: 3.44 10E12/L (ref 3.8–5.2)
SARS-COV-2 RNA RESP QL NAA+PROBE: NEGATIVE
SODIUM SERPL-SCNC: 138 MMOL/L (ref 133–144)
SPECIMEN EXP DATE BLD: NORMAL
SPECIMEN SOURCE: NORMAL
WBC # BLD AUTO: 20.4 10E9/L (ref 4–11)

## 2021-05-30 PROCEDURE — 250N000011 HC RX IP 250 OP 636: Performed by: STUDENT IN AN ORGANIZED HEALTH CARE EDUCATION/TRAINING PROGRAM

## 2021-05-30 PROCEDURE — 250N000013 HC RX MED GY IP 250 OP 250 PS 637: Performed by: STUDENT IN AN ORGANIZED HEALTH CARE EDUCATION/TRAINING PROGRAM

## 2021-05-30 PROCEDURE — 80053 COMPREHEN METABOLIC PANEL: CPT | Performed by: STUDENT IN AN ORGANIZED HEALTH CARE EDUCATION/TRAINING PROGRAM

## 2021-05-30 PROCEDURE — 84156 ASSAY OF PROTEIN URINE: CPT | Performed by: STUDENT IN AN ORGANIZED HEALTH CARE EDUCATION/TRAINING PROGRAM

## 2021-05-30 PROCEDURE — 370N000003 HC ANESTHESIA WARD SERVICE

## 2021-05-30 PROCEDURE — 74177 CT ABD & PELVIS W/CONTRAST: CPT | Mod: 26 | Performed by: RADIOLOGY

## 2021-05-30 PROCEDURE — 999N000104 HC STATISTIC NO CHARGE

## 2021-05-30 PROCEDURE — 250N000011 HC RX IP 250 OP 636: Performed by: OBSTETRICS & GYNECOLOGY

## 2021-05-30 PROCEDURE — 86780 TREPONEMA PALLIDUM: CPT | Performed by: STUDENT IN AN ORGANIZED HEALTH CARE EDUCATION/TRAINING PROGRAM

## 2021-05-30 PROCEDURE — 85025 COMPLETE CBC W/AUTO DIFF WBC: CPT | Performed by: STUDENT IN AN ORGANIZED HEALTH CARE EDUCATION/TRAINING PROGRAM

## 2021-05-30 PROCEDURE — 86850 RBC ANTIBODY SCREEN: CPT | Performed by: STUDENT IN AN ORGANIZED HEALTH CARE EDUCATION/TRAINING PROGRAM

## 2021-05-30 PROCEDURE — 250N000009 HC RX 250: Performed by: OBSTETRICS & GYNECOLOGY

## 2021-05-30 PROCEDURE — 86900 BLOOD TYPING SEROLOGIC ABO: CPT | Performed by: STUDENT IN AN ORGANIZED HEALTH CARE EDUCATION/TRAINING PROGRAM

## 2021-05-30 PROCEDURE — 74177 CT ABD & PELVIS W/CONTRAST: CPT

## 2021-05-30 PROCEDURE — 120N000002 HC R&B MED SURG/OB UMMC

## 2021-05-30 PROCEDURE — 36415 COLL VENOUS BLD VENIPUNCTURE: CPT | Performed by: STUDENT IN AN ORGANIZED HEALTH CARE EDUCATION/TRAINING PROGRAM

## 2021-05-30 PROCEDURE — 87635 SARS-COV-2 COVID-19 AMP PRB: CPT | Performed by: OBSTETRICS & GYNECOLOGY

## 2021-05-30 PROCEDURE — 86901 BLOOD TYPING SEROLOGIC RH(D): CPT | Performed by: STUDENT IN AN ORGANIZED HEALTH CARE EDUCATION/TRAINING PROGRAM

## 2021-05-30 PROCEDURE — G0463 HOSPITAL OUTPT CLINIC VISIT: HCPCS

## 2021-05-30 RX ORDER — IBUPROFEN 800 MG/1
800 TABLET, FILM COATED ORAL
Status: DISCONTINUED | OUTPATIENT
Start: 2021-05-30 | End: 2021-05-30

## 2021-05-30 RX ORDER — MELATONIN 5 MG
15 TABLET,CHEWABLE ORAL
COMMUNITY
End: 2021-07-19

## 2021-05-30 RX ORDER — DIPHENHYDRAMINE HYDROCHLORIDE 50 MG/ML
25 INJECTION INTRAMUSCULAR; INTRAVENOUS EVERY 6 HOURS PRN
Status: DISCONTINUED | OUTPATIENT
Start: 2021-05-30 | End: 2021-06-03 | Stop reason: HOSPADM

## 2021-05-30 RX ORDER — TRANEXAMIC ACID 10 MG/ML
1 INJECTION, SOLUTION INTRAVENOUS EVERY 30 MIN PRN
Status: DISCONTINUED | OUTPATIENT
Start: 2021-05-30 | End: 2021-06-03 | Stop reason: HOSPADM

## 2021-05-30 RX ORDER — MISOPROSTOL 100 UG/1
25 TABLET ORAL
Status: DISCONTINUED | OUTPATIENT
Start: 2021-05-30 | End: 2021-05-30

## 2021-05-30 RX ORDER — NALOXONE HYDROCHLORIDE 0.4 MG/ML
0.2 INJECTION, SOLUTION INTRAMUSCULAR; INTRAVENOUS; SUBCUTANEOUS
Status: DISCONTINUED | OUTPATIENT
Start: 2021-05-30 | End: 2021-06-03 | Stop reason: HOSPADM

## 2021-05-30 RX ORDER — TERBUTALINE SULFATE 1 MG/ML
0.25 INJECTION, SOLUTION SUBCUTANEOUS
Status: DISCONTINUED | OUTPATIENT
Start: 2021-05-30 | End: 2021-05-30

## 2021-05-30 RX ORDER — FENTANYL CITRATE 50 UG/ML
50-100 INJECTION, SOLUTION INTRAMUSCULAR; INTRAVENOUS
Status: DISCONTINUED | OUTPATIENT
Start: 2021-05-30 | End: 2021-06-01

## 2021-05-30 RX ORDER — IOPAMIDOL 755 MG/ML
50 INJECTION, SOLUTION INTRAVASCULAR ONCE
Status: COMPLETED | OUTPATIENT
Start: 2021-05-30 | End: 2021-05-30

## 2021-05-30 RX ORDER — HYDROMORPHONE HYDROCHLORIDE 1 MG/ML
0.3 INJECTION, SOLUTION INTRAMUSCULAR; INTRAVENOUS; SUBCUTANEOUS
Status: DISCONTINUED | OUTPATIENT
Start: 2021-05-30 | End: 2021-06-01

## 2021-05-30 RX ORDER — OXYTOCIN 10 [USP'U]/ML
10 INJECTION, SOLUTION INTRAMUSCULAR; INTRAVENOUS
Status: DISCONTINUED | OUTPATIENT
Start: 2021-05-30 | End: 2021-06-03 | Stop reason: HOSPADM

## 2021-05-30 RX ORDER — CARBOPROST TROMETHAMINE 250 UG/ML
250 INJECTION, SOLUTION INTRAMUSCULAR
Status: DISCONTINUED | OUTPATIENT
Start: 2021-05-30 | End: 2021-06-03 | Stop reason: HOSPADM

## 2021-05-30 RX ORDER — TERBUTALINE SULFATE 1 MG/ML
0.25 INJECTION, SOLUTION SUBCUTANEOUS
Status: DISCONTINUED | OUTPATIENT
Start: 2021-05-30 | End: 2021-06-03 | Stop reason: HOSPADM

## 2021-05-30 RX ORDER — OXYCODONE HYDROCHLORIDE 5 MG/1
10 CAPSULE ORAL EVERY 4 HOURS PRN
Status: ON HOLD | COMMUNITY
End: 2021-06-03

## 2021-05-30 RX ORDER — ONDANSETRON 2 MG/ML
4 INJECTION INTRAMUSCULAR; INTRAVENOUS EVERY 6 HOURS PRN
Status: DISCONTINUED | OUTPATIENT
Start: 2021-05-30 | End: 2021-06-03 | Stop reason: HOSPADM

## 2021-05-30 RX ORDER — OXYTOCIN/0.9 % SODIUM CHLORIDE 30/500 ML
100-340 PLASTIC BAG, INJECTION (ML) INTRAVENOUS CONTINUOUS PRN
Status: COMPLETED | OUTPATIENT
Start: 2021-05-30 | End: 2021-05-31

## 2021-05-30 RX ORDER — DIPHENHYDRAMINE HCL 25 MG
25 CAPSULE ORAL EVERY 6 HOURS PRN
Status: DISCONTINUED | OUTPATIENT
Start: 2021-05-30 | End: 2021-06-03 | Stop reason: HOSPADM

## 2021-05-30 RX ORDER — MISOPROSTOL 100 UG/1
25 TABLET ORAL
Status: DISCONTINUED | OUTPATIENT
Start: 2021-05-30 | End: 2021-06-03 | Stop reason: HOSPADM

## 2021-05-30 RX ORDER — BUPROPION HYDROCHLORIDE 100 MG/1
100 TABLET, EXTENDED RELEASE ORAL 2 TIMES DAILY
Status: DISCONTINUED | OUTPATIENT
Start: 2021-05-30 | End: 2021-05-30

## 2021-05-30 RX ORDER — AMOXICILLIN 250 MG
1 CAPSULE ORAL AT BEDTIME
Status: DISCONTINUED | OUTPATIENT
Start: 2021-05-30 | End: 2021-06-03 | Stop reason: HOSPADM

## 2021-05-30 RX ORDER — NALOXONE HYDROCHLORIDE 0.4 MG/ML
0.4 INJECTION, SOLUTION INTRAMUSCULAR; INTRAVENOUS; SUBCUTANEOUS
Status: DISCONTINUED | OUTPATIENT
Start: 2021-05-30 | End: 2021-06-03 | Stop reason: HOSPADM

## 2021-05-30 RX ORDER — SODIUM CHLORIDE, SODIUM LACTATE, POTASSIUM CHLORIDE, CALCIUM CHLORIDE 600; 310; 30; 20 MG/100ML; MG/100ML; MG/100ML; MG/100ML
INJECTION, SOLUTION INTRAVENOUS CONTINUOUS
Status: DISCONTINUED | OUTPATIENT
Start: 2021-05-30 | End: 2021-06-03 | Stop reason: HOSPADM

## 2021-05-30 RX ORDER — MORPHINE SULFATE 10 MG/ML
10 INJECTION, SOLUTION INTRAMUSCULAR; INTRAVENOUS ONCE
Status: COMPLETED | OUTPATIENT
Start: 2021-05-30 | End: 2021-05-30

## 2021-05-30 RX ORDER — OXYCODONE AND ACETAMINOPHEN 5; 325 MG/1; MG/1
1 TABLET ORAL
Status: COMPLETED | OUTPATIENT
Start: 2021-05-30 | End: 2021-05-31

## 2021-05-30 RX ORDER — METHYLERGONOVINE MALEATE 0.2 MG/ML
200 INJECTION INTRAVENOUS
Status: DISCONTINUED | OUTPATIENT
Start: 2021-05-30 | End: 2021-06-03 | Stop reason: HOSPADM

## 2021-05-30 RX ORDER — ACETAMINOPHEN 325 MG/1
650 TABLET ORAL EVERY 4 HOURS PRN
Status: DISCONTINUED | OUTPATIENT
Start: 2021-05-30 | End: 2021-06-03 | Stop reason: HOSPADM

## 2021-05-30 RX ORDER — HYDROXYZINE HYDROCHLORIDE 50 MG/1
100 TABLET, FILM COATED ORAL ONCE
Status: COMPLETED | OUTPATIENT
Start: 2021-05-30 | End: 2021-05-30

## 2021-05-30 RX ORDER — IOPAMIDOL 755 MG/ML
100 INJECTION, SOLUTION INTRAVASCULAR ONCE
Status: COMPLETED | OUTPATIENT
Start: 2021-05-30 | End: 2021-05-30

## 2021-05-30 RX ADMIN — HYDROXYZINE HYDROCHLORIDE 100 MG: 50 TABLET, FILM COATED ORAL at 19:44

## 2021-05-30 RX ADMIN — HYDROMORPHONE HYDROCHLORIDE 0.3 MG: 1 INJECTION, SOLUTION INTRAMUSCULAR; INTRAVENOUS; SUBCUTANEOUS at 16:25

## 2021-05-30 RX ADMIN — SODIUM CHLORIDE 74 ML: 9 INJECTION, SOLUTION INTRAVENOUS at 14:50

## 2021-05-30 RX ADMIN — IOPAMIDOL 100 ML: 755 INJECTION, SOLUTION INTRAVENOUS at 14:50

## 2021-05-30 RX ADMIN — IOPAMIDOL 135 ML: 755 INJECTION, SOLUTION INTRAVENOUS at 14:49

## 2021-05-30 RX ADMIN — Medication 25 MCG: at 23:15

## 2021-05-30 RX ADMIN — Medication 25 MCG: at 18:50

## 2021-05-30 RX ADMIN — Medication 25 MCG: at 21:20

## 2021-05-30 RX ADMIN — MORPHINE SULFATE 10 MG: 10 INJECTION INTRAVENOUS at 19:45

## 2021-05-30 RX ADMIN — ACETAMINOPHEN 650 MG: 325 TABLET, FILM COATED ORAL at 19:32

## 2021-05-30 ASSESSMENT — ACTIVITIES OF DAILY LIVING (ADL)
DIFFICULTY_COMMUNICATING: NO
DOING_ERRANDS_INDEPENDENTLY_DIFFICULTY: NO
HEARING_DIFFICULTY_OR_DEAF: NO
FALL_HISTORY_WITHIN_LAST_SIX_MONTHS: NO
WEAR_GLASSES_OR_BLIND: NO
DIFFICULTY_EATING/SWALLOWING: NO
DRESSING/BATHING_DIFFICULTY: NO
CONCENTRATING,_REMEMBERING_OR_MAKING_DECISIONS_DIFFICULTY: NO
TOILETING_ISSUES: NO
WALKING_OR_CLIMBING_STAIRS_DIFFICULTY: NO

## 2021-05-30 ASSESSMENT — MIFFLIN-ST. JEOR: SCORE: 2043.5

## 2021-05-30 NOTE — ED NOTES
Pt arrived to ED with complaint of RUQ and RLQ pain  Pt reports 9 mos pregnant.   Pt is having contractions No. Claims to have cramping   Pt feels urge to push No.   Pt reports water broke No.   Report was called and pt was transferred to L&D Yes.

## 2021-05-30 NOTE — PROGRESS NOTES
[facility]  OB History and Physical      Morgan Mcbride MRN# [id number]   Age: 30 year old YOB: 1990     CC:  Abdominal pain, choledocholithiasis    HPI:  Morgan Mcbride is a 30 year old  36w2d by 16w2d US, who presents after being seen at [facility] for a new diagnosis of symptomatic choledocholithiasis. Patient presented there on Wednesday with severe RUQ and RLQ pain that also stretched across the epigastric region, significant nausea and multiple episodes of vomiting as well as diarrhea. An US there was done to evaluate for appendicitis, however the appendix was unable to be seen. At the time, discussion was had whether to pursue induction versus  delivery with concurrent cholecystectomy. However, it was noted that the patient had had a Leticia en Y gastric bypass and the surgical team at [facility] deemed she was too high risk to operate on there. They recommended she transfer her care to the [facility] for further management.    Since her discharge, patient has not had any further nausea/vomiting or diarrhea. She has been able to tolerate PO intake. She continues to have significant pain both in her RUQ and RLQ though since 400 it has been worse in her RLQ. She does not feel like these are contractions. She has not been able to sleep since her discharge because of her pain despite taking 10mg oxycodone, 1000mg Tylenol 100mg Vistaril, and Melatonin. She has had a persistent headache since Wednesday which improves, but does not resolve with medications. Denies vision changes. She has had SOB that worsens with this pain. She additionally has had episodes of choking in her sleep which started 2-3 weeks ago which has been difficult for her. She has chest pain with the worsening abdominal pain as well. She also reports burning/acid reflux symptoms that do not improve with Prilosec. She denies any vaginal discharge, vulvar  burning/irritation, vaginal bleeding, dysuria, fevers/chills. She has been constipated since her discharge and was only able to have one small stool after taking Miralax and Dulcolax yesterday. She is passing flatus. Denies LOF. Reports normal fetal movement.      ROS:   Complete 10-point ROS negative except as noted in HPI.    Pregnancy Complications:  - Chronic HTN  - H/o gastric bypass, Leticia en Y in   - Choledocholithiasis  - JEFFREY  - Lisinopril exposure in early pregnancy      Prenatal Labs:   Lab Results   Component Value Date    ABO O 2012    RH  Pos 2012    AS Neg 2012    HGB 12.0 10/07/2016     Rubella immune, Hep B NR, RPR neg, HIV neg    GBS Status: NEGATIVE    OB History  OB History    Para Term  AB Living   5 1 1 0 3 1   SAB TAB Ectopic Multiple Live Births   1 1 1 0 1      # Outcome Date GA Lbr Robbie/2nd Weight Sex Delivery Anes PTL Lv   5 Current            4 Term 10/14/14 39w6d  3.459 kg (7 lb 10 oz) M Vag-Spont   REGINA      Name: Shiva Boy      Apgar1: 9  Apgar5: 9   3 SAB 10/01/13 8w0d             Complications: Traumatic injury   2 TAB            1 Ectopic      ECTOPIC         Birth Comments: Methotrexate.  Inadequate follow up, but pregnancy resolved.       PMHx:   Past Medical History:   Diagnosis Date     Hypertension      Obesity      PSHx:  - Laparoscopic salpingostomy for ectopic pregnancy, unsure which side. Patient states she had surgery, chart review indicates she took methotrexate.  Past Surgical History:   Procedure Laterality Date     BIOPSY       ESOPHAGOSCOPY, GASTROSCOPY, DUODENOSCOPY (EGD), COMBINED  2012    Procedure: COMBINED ESOPHAGOSCOPY, GASTROSCOPY, DUODENOSCOPY (EGD);;  Surgeon: Chris Garcia MD;  Location:  GI     ESOPHAGOSCOPY, GASTROSCOPY, DUODENOSCOPY (EGD), COMBINED N/A 2014    Procedure: COMBINED ESOPHAGOSCOPY, GASTROSCOPY, DUODENOSCOPY (EGD), BIOPSY SINGLE OR MULTIPLE;  Surgeon: Salvador Walter MD;  Location:  [facility] GI     LAPAROSCOPIC BYPASS GASTRIC  5/18/2012    Procedure:LAPAROSCOPIC BYPASS GASTRIC; Laparoscopic Leticia-En-Y Gastric Bypass; Surgeon:MARCO MINER; Location:[facility] OR     US BREAST BIOPSY RT         Meds:   Medications Prior to Admission   Medication Sig Dispense Refill Last Dose     Acetaminophen 325 MG CAPS Take 325-650 mg by mouth every 4 hours as needed    at 0600     hydrOXYzine (ATARAX) 25 MG tablet Take 50 mg by mouth as needed    5/30/2021 at 0600     Magnesium Hydroxide (DULCOLAX PO)    5/29/2021 at 1200     Melatonin 5 MG CHEW Take 15 mg by mouth    at [address]     oxyCODONE (OXY-IR) 5 MG capsule Take 10 mg by mouth every 4 hours as needed for severe pain   5/30/2021 at 0600     polyethylene glycol (MIRALAX) 17 GM/Dose powder Take 17 g (1 capful) by mouth daily 527 g 3 5/29/2021 at Unknown time     buPROPion (WELLBUTRIN SR) 100 MG 12 hr tablet Take 1 tablet (100 mg) by mouth 2 times daily 60 tablet 0 More than a month at Unknown time     lisinopril (ZESTRIL) 5 MG tablet Take 5 mg by mouth   More than a month at Unknown time     medroxyPROGESTERone (DEPO-PROVERA) 150 MG/ML IM injection         medroxyPROGESTERone (DEPO-PROVERA) 150 MG/ML syringe Inject 150 mg into the muscle        naltrexone (DEPADE/REVIA) 50 MG tablet Take 1/2 tablet once daily 1-2 hours prior to worst cravings for 1 week, then increase to 1 tablet daily as directed if tolerating 30 tablet 1      omeprazole (PRILOSEC) 20 MG DR capsule         omeprazole (PRILOSEC) 40 MG DR capsule Take 1 capsule (40 mg) by mouth daily 30 capsule 3 More than a month at Unknown time          Allergies:    Allergies   Allergen Reactions     Nsaids GI Disturbance and Other (See Comments)     Hx gastric bypass      SocHx:  She denies any tobacco, alcohol, or other drug use during this pregnancy.    PE:  Vit:   Patient Vitals for the past 4 hrs:   BP Temp Temp src Pulse Resp SpO2   05/30/21 1115 108/55 98.7  F (37.1  C) Oral -- 20 99 %   05/30/21 1038  122/55 96.7  F (35.9  C) Oral 94 16 99 %      Gen: Well-appearing, NAD, comfortable  CV: Well perfused, regular rate  Pulm: Breathing hard when she has to move and when abdominal pain worsens  Abd: Soft, gravid, tender to palpation in RUQ and RLQ. No rebound/guarding  Ext: Difficult to assess for edema due to body habitus  Cx: 1-280/-3    Pres:  cephalic by BSUS  Memb: intact              FHT: Baseline 140, mod variability, accelerations present, no decelerations   [place]: 0 contractions in 10 minutes      Assessment/Plan:  Morgan Mcbride is a 30 year old  at 36w2d by 16w2d US, who presents with worsening abdominal pain in the setting of recently diagnosed choledocholithiasis and history of Leticia en Y gastric bypass. Differential includes worsening choledocholithiasis, cholecystitis, appendicitis, intussusception, incarcerated hernia. Due to worsening symptoms and concern for maternal morbidity with a possible worsening infectious process or possible incarcerated hernia leading to the need for emergent delivery, the recommendation was made to proceed towards induction of labor. Discussed that once baby is out, we will be better able to manage the patient's symptoms and proceed to possible surgical management for her choledocholithiasis. Patient is agreeable and would like to proceed with this plan.    Induction of Labor  - Admit to L&D  - Labor: Cervix is 1-/-3, Will start with misoprostol. Anticipate   - FWB: Category 1 FHT.  Continue EFM and toco  - Pain: Unsure about pain control in labor. May want an epidural, but is fearful of needle. Continuing to think on this.  - PNC: Rh pos, Rubella imm, GBS neg, Placenta posterior, EFW 1982g/37%ile on 5/3  - Fen/GI: Clear liquid diet, IVF    Abdominal Pain, known choledocholithiasis  - Differential at this time is as stated above in assessment, patient with history of Leticia en Y.  - Hepatic panel wnl, WBC elevated although likely due to recent BMZ  course  - S/p BMZ course 5/27-5/28 at [facility]  - Will order CT A/P to evaluate for appendicitis, intussusception, incarcerated hernia, other abdominal pathology. Discussed minor risk of radiation to fetus at this stage of pregnancy. Patient agrees to proceed.    Chronic HTN: no meds, HELLP labs wnl, UPC 0.21    The patient was discussed with Dr. Hunter who is in agreement with the treatment plan.    Shiloh Gunderson MD  Ob/Gyn Resident, PGY-1  05/30/2021 11:54 AM

## 2021-05-30 NOTE — H&P
[facility]  OB History and Physical      Morgan Mcbride MRN# [id number]   Age: 30 year old YOB: 1990     CC:  Abdominal pain, choledocholithiasis    HPI:  Morgan Mcbride is a 30 year old  36w2d by 16w2d US, who presents after being seen at [facility] for a new diagnosis of symptomatic choledocholithiasis. Patient presented there on Wednesday with severe RUQ and RLQ pain that also stretched across the epigastric region, significant nausea and multiple episodes of vomiting as well as diarrhea. An US there was done to evaluate for appendicitis, however the appendix was unable to be seen. At the time, discussion was had whether to pursue induction versus  delivery with concurrent cholecystectomy. However, it was noted that the patient had had a Leticia en Y gastric bypass and the surgical team at [facility] deemed she was too high risk to operate on there. They recommended she transfer her care to the [facility] for further management.    Since her discharge, patient has not had any further nausea/vomiting or diarrhea. She has been able to tolerate PO intake. She continues to have significant pain both in her RUQ and RLQ though since 400 it has been worse in her RLQ. She does not feel like these are contractions. She has not been able to sleep since her discharge because of her pain despite taking 10mg oxycodone, 1000mg Tylenol 100mg Vistaril, and Melatonin. She has had a persistent headache since Wednesday which improves, but does not resolve with medications. Denies vision changes. She has had SOB that worsens with this pain. She additionally has had episodes of choking in her sleep which started 2-3 weeks ago which has been difficult for her. She has chest pain with the worsening abdominal pain as well. She also reports burning/acid reflux symptoms that do not improve with Prilosec. She denies any vaginal discharge, vulvar  burning/irritation, vaginal bleeding, dysuria, fevers/chills. She has been constipated since her discharge and was only able to have one small stool after taking Miralax and Dulcolax yesterday. She is passing flatus. Denies LOF. Reports normal fetal movement.    ROS:   Complete 10-point ROS negative except as noted in HPI.    Pregnancy Complications:  - Chronic HTN  - H/o gastric bypass, Leticia en Y in   - Choledocholithiasis  - JEFFREY  - Lisinopril exposure in early pregnancy      Prenatal Labs:   Lab Results   Component Value Date    ABO O 2021    RH Pos 2021    AS Neg 2021    HGB 9.8 (L) 2021     Rubella immune, Hep B NR, RPR neg, HIV neg    GBS Status: NEGATIVE    OB History  OB History    Para Term  AB Living   5 1 1 0 3 1   SAB TAB Ectopic Multiple Live Births   1 1 1 0 1      # Outcome Date GA Lbr Robbie/2nd Weight Sex Delivery Anes PTL Lv   5 Current            4 Term 10/14/14 39w6d  3.459 kg (7 lb 10 oz) M Vag-Spont   REGINA      Name: Shiva Boy      Apgar1: 9  Apgar5: 9   3 SAB 10/01/13 8w0d             Complications: Traumatic injury   2 TAB            1 Ectopic      ECTOPIC         Birth Comments: Methotrexate.  Inadequate follow up, but pregnancy resolved.       PMHx:   Past Medical History:   Diagnosis Date     Hypertension      Obesity      PSHx:  - Laparoscopic salpingostomy for ectopic pregnancy, unsure which side. Patient states she had surgery, chart review indicates she took methotrexate.  Past Surgical History:   Procedure Laterality Date     BIOPSY       ESOPHAGOSCOPY, GASTROSCOPY, DUODENOSCOPY (EGD), COMBINED  2012    Procedure: COMBINED ESOPHAGOSCOPY, GASTROSCOPY, DUODENOSCOPY (EGD);;  Surgeon: Chris Garcia MD;  Location:  GI     ESOPHAGOSCOPY, GASTROSCOPY, DUODENOSCOPY (EGD), COMBINED N/A 2014    Procedure: COMBINED ESOPHAGOSCOPY, GASTROSCOPY, DUODENOSCOPY (EGD), BIOPSY SINGLE OR MULTIPLE;  Surgeon: Salvador Walter MD;  Location:  [facility]     LAPAROSCOPIC BYPASS GASTRIC  5/18/2012    Procedure:LAPAROSCOPIC BYPASS GASTRIC; Laparoscopic Leticia-En-Y Gastric Bypass; Surgeon:MARCO MINER; Location:[facility] OR     US BREAST BIOPSY RT         Meds:   Medications Prior to Admission   Medication Sig Dispense Refill Last Dose     Acetaminophen 325 MG CAPS Take 325-650 mg by mouth every 4 hours as needed    at 0600     hydrOXYzine (ATARAX) 25 MG tablet Take 50 mg by mouth as needed    5/30/2021 at 0600     Magnesium Hydroxide (DULCOLAX PO)    5/29/2021 at 1200     Melatonin 5 MG CHEW Take 15 mg by mouth    at 32580     oxyCODONE (OXY-IR) 5 MG capsule Take 10 mg by mouth every 4 hours as needed for severe pain   5/30/2021 at 0600     polyethylene glycol (MIRALAX) 17 GM/Dose powder Take 17 g (1 capful) by mouth daily 527 g 3 5/29/2021 at Unknown time     buPROPion (WELLBUTRIN SR) 100 MG 12 hr tablet Take 1 tablet (100 mg) by mouth 2 times daily 60 tablet 0 More than a month at Unknown time     lisinopril (ZESTRIL) 5 MG tablet Take 5 mg by mouth   More than a month at Unknown time     medroxyPROGESTERone (DEPO-PROVERA) 150 MG/ML IM injection         medroxyPROGESTERone (DEPO-PROVERA) 150 MG/ML syringe Inject 150 mg into the muscle        naltrexone (DEPADE/REVIA) 50 MG tablet Take 1/2 tablet once daily 1-2 hours prior to worst cravings for 1 week, then increase to 1 tablet daily as directed if tolerating 30 tablet 1      omeprazole (PRILOSEC) 20 MG DR capsule         omeprazole (PRILOSEC) 40 MG DR capsule Take 1 capsule (40 mg) by mouth daily 30 capsule 3 More than a month at Unknown time          Allergies:    Allergies   Allergen Reactions     Nsaids GI Disturbance and Other (See Comments)     Hx gastric bypass      SocHx:  She denies any tobacco, alcohol, or other drug use during this pregnancy.    PE:  Vit:   Patient Vitals for the past 4 hrs:   BP Temp Temp src Pulse Resp SpO2 Height Weight   05/30/21 1200 -- -- -- -- -- 100 % -- --   05/30/21 1145 --  -- -- -- -- 100 % -- --   21 1130 -- -- -- -- -- 100 % -- --   21 1115 108/55 98.7  F (37.1  C) Oral -- 20 99 % 1.524 m (5') 140.2 kg (309 lb 1.4 oz)   21 1038 122/55 96.7  F (35.9  C) Oral 94 16 99 % -- --      Gen: Well-appearing, NAD, uncomfortable with movement.   CV: RRR  Pulm: CTAB  Abd: Soft, gravid, tender to palpation in RUQ and RLQ. No rebound/guarding  Ext: Trace BLE edema  Cx: 1-80/-3    Pres:  cephalic by BSUS  Memb: intact              FHT: Baseline 140, mod variability, accelerations present, no decelerations   [place]: 0 contractions in 10 minutes      Assessment/Plan:  Morgan Mcbride is a 30 year old  at 36w2d by 16w2d US, who presents with worsening abdominal pain in the setting of recently diagnosed choledocholithiasis and history of Leticia en Y gastric bypass. Differential includes worsening choledocholithiasis, cholecystitis, appendicitis, intussusception, incarcerated hernia. Due to worsening symptoms and concern for maternal morbidity with a possible worsening infectious process or possible incarcerated hernia CT scan ordered prior to planning for delivery. Due to persistent symptoms at this GA, will plan for delivery following CT scan- delivery type pending CT.     Induction of Labor vs  Section  - Admit to L&D. IOL if no urgent need to move towards surgical delivery per CT findings.  - Labor: Cervix is 1-/-3, If moving towards induction will start with misoprostol. Anticipate   - FWB: Category 1 FHT.  Continue EFM and toco  - Pain: Unsure about pain control in labor. May want an epidural, but is fearful of needle. Continuing to think on this.  - PNC: Rh pos, Rubella imm, GBS neg, Placenta posterior, EFW 1982g/37%ile on 5/3  - Fen/GI: Clear liquid diet, IVF    Abdominal Pain, known choledocholithiasis  - Differential at this time is as stated above in assessment, patient with history of Leticia en Y.  - Hepatic panel wnl, WBC elevated although likely  due to recent BMZ course  - S/p BMZ course 5/27-5/28 at [facility]  - Will order CT A/P to evaluate for appendicitis, intussusception, incarcerated hernia, other abdominal pathology. Discussed minor risk of radiation to fetus at this stage of pregnancy. Patient agrees to proceed.    Chronic HTN: no meds, HELLP labs wnl, UPC 0.21    The patient was discussed with Dr. Hunter who is in agreement with the treatment plan.    Shiloh Gunderson MD  Ob/Gyn Resident, PGY-1  05/30/2021 1:34 PM     Appreciate note by Dr. Gunderson. Patient has been seen and examined by me separate from the resident, agree with above note. CT scan without evidence of acute process. Patient reviewed with Dr. Walter- bariatric surgeon. Plan for IOL and then follow up with Dr. Walter 1-2 wks postpartum to discuss laparoscopic cholecystectomy. Patient agreeable to plan.     María Hunter MD  6:56 PM

## 2021-05-30 NOTE — PLAN OF CARE
Data: Patient presented to [facility] at 1100.   Reason for maternal/fetal assessment per patient is Abdominal Pain  .  Patient is a . Prenatal record reviewed.      OB History    Para Term  AB Living   5 1 1 0 3 1   SAB TAB Ectopic Multiple Live Births   1 1 1 0 1      # Outcome Date GA Lbr Robbie/2nd Weight Sex Delivery Anes PTL Lv   5 Current            4 Term 10/14/14 39w6d  3.459 kg (7 lb 10 oz) M Vag-Spont   REGINA      Name: Shiva Sullivan      Apgar1: 9  Apgar5: 9   3 SAB 10/01/13 8w0d             Complications: Traumatic injury   2 TAB            1 Ectopic      ECTOPIC         Birth Comments: Methotrexate.  Inadequate follow up, but pregnancy resolved.   . Medical history:   Past Medical History:   Diagnosis Date     Hypertension      Obesity    . Gestational Age 36w3d. VSS. Fetal movement present. Patient c/o pain in upper abdomen and down right side of abdomen. Patient states she has some lower central back pain that is more pregnancy related.  Patient denies vaginal discharge, pelvic pressure, UTI symptoms, GI problems, bloody show, vaginal bleeding, edema, headache, visual disturbances, epigastric or URQ pain, abdominal pain, rupture of membranes. Support persons not present.  Action: Verbal consent for EFM. Triage assessment completed. EFM applied for fetal assesment. Uterine assessment denies contractions and no contractions picked up with external monitoring. Fetal assessment: Presumed adequate fetal oxygenation documented (see flow record).  Fetal strip reviewed with Dr. Guallpa, unable to continuously monitor due to maternal habitus and positioning for pain relief.  NST approximately 20 minutes over 45 minutes time, reactive.  Okay to take off for pain relief, will continue to assess as able.  Response: Dr. Guallpa and Dr. RAQUEL Hunter informed of arrival and patient has been seen by Dr. Brasher and Dr. Gunderson. Plan per provider is await lab results. Patient verbalized agreement with  plan. Patient oriented to room and call light.

## 2021-05-30 NOTE — PROVIDER NOTIFICATION
05/30/21 1130   Provider Notification   Provider Name/Title Dr. Guallpa and Dr. RAQUEL Hunter   Method of Notification In Department   Request Evaluate in Person   Notification Reason Patient Arrived;Pain

## 2021-05-31 ENCOUNTER — ANESTHESIA (OUTPATIENT)
Dept: OBGYN | Facility: CLINIC | Age: 31
End: 2021-05-31
Payer: COMMERCIAL

## 2021-05-31 ENCOUNTER — ANESTHESIA EVENT (OUTPATIENT)
Dept: OBGYN | Facility: CLINIC | Age: 31
End: 2021-05-31
Payer: COMMERCIAL

## 2021-05-31 PROCEDURE — 250N000013 HC RX MED GY IP 250 OP 250 PS 637

## 2021-05-31 PROCEDURE — 120N000002 HC R&B MED SURG/OB UMMC

## 2021-05-31 PROCEDURE — 59409 OBSTETRICAL CARE: CPT | Mod: GC | Performed by: OBSTETRICS & GYNECOLOGY

## 2021-05-31 PROCEDURE — 250N000013 HC RX MED GY IP 250 OP 250 PS 637: Performed by: STUDENT IN AN ORGANIZED HEALTH CARE EDUCATION/TRAINING PROGRAM

## 2021-05-31 PROCEDURE — 3E0R3BZ INTRODUCTION OF ANESTHETIC AGENT INTO SPINAL CANAL, PERCUTANEOUS APPROACH: ICD-10-PCS | Performed by: STUDENT IN AN ORGANIZED HEALTH CARE EDUCATION/TRAINING PROGRAM

## 2021-05-31 PROCEDURE — 00HU33Z INSERTION OF INFUSION DEVICE INTO SPINAL CANAL, PERCUTANEOUS APPROACH: ICD-10-PCS | Performed by: STUDENT IN AN ORGANIZED HEALTH CARE EDUCATION/TRAINING PROGRAM

## 2021-05-31 PROCEDURE — 250N000011 HC RX IP 250 OP 636: Performed by: STUDENT IN AN ORGANIZED HEALTH CARE EDUCATION/TRAINING PROGRAM

## 2021-05-31 PROCEDURE — 250N000009 HC RX 250: Performed by: STUDENT IN AN ORGANIZED HEALTH CARE EDUCATION/TRAINING PROGRAM

## 2021-05-31 PROCEDURE — 258N000003 HC RX IP 258 OP 636: Performed by: STUDENT IN AN ORGANIZED HEALTH CARE EDUCATION/TRAINING PROGRAM

## 2021-05-31 PROCEDURE — 722N000001 HC LABOR CARE VAGINAL DELIVERY SINGLE

## 2021-05-31 PROCEDURE — 10907ZC DRAINAGE OF AMNIOTIC FLUID, THERAPEUTIC FROM PRODUCTS OF CONCEPTION, VIA NATURAL OR ARTIFICIAL OPENING: ICD-10-PCS | Performed by: OBSTETRICS & GYNECOLOGY

## 2021-05-31 PROCEDURE — 250N000009 HC RX 250

## 2021-05-31 PROCEDURE — 258N000003 HC RX IP 258 OP 636

## 2021-05-31 PROCEDURE — 250N000013 HC RX MED GY IP 250 OP 250 PS 637: Performed by: OBSTETRICS & GYNECOLOGY

## 2021-05-31 PROCEDURE — 250N000011 HC RX IP 250 OP 636

## 2021-05-31 PROCEDURE — 370N000003 HC ANESTHESIA WARD SERVICE

## 2021-05-31 RX ORDER — AMOXICILLIN 250 MG
1 CAPSULE ORAL 2 TIMES DAILY
Status: DISCONTINUED | OUTPATIENT
Start: 2021-05-31 | End: 2021-06-03 | Stop reason: HOSPADM

## 2021-05-31 RX ORDER — FENTANYL/BUPIVACAINE/NS/PF 2-1250MCG
PLASTIC BAG, INJECTION (ML) INJECTION
Status: COMPLETED
Start: 2021-05-31 | End: 2021-05-31

## 2021-05-31 RX ORDER — NALBUPHINE HYDROCHLORIDE 10 MG/ML
2.5-5 INJECTION, SOLUTION INTRAMUSCULAR; INTRAVENOUS; SUBCUTANEOUS EVERY 6 HOURS PRN
Status: DISCONTINUED | OUTPATIENT
Start: 2021-05-31 | End: 2021-06-03 | Stop reason: HOSPADM

## 2021-05-31 RX ORDER — LIDOCAINE/PRILOCAINE 2.5 %-2.5%
CREAM (GRAM) TOPICAL
Status: DISCONTINUED | OUTPATIENT
Start: 2021-05-31 | End: 2021-06-03 | Stop reason: HOSPADM

## 2021-05-31 RX ORDER — TRANEXAMIC ACID 10 MG/ML
INJECTION, SOLUTION INTRAVENOUS
Status: DISCONTINUED
Start: 2021-05-31 | End: 2021-05-31 | Stop reason: HOSPADM

## 2021-05-31 RX ORDER — OXYTOCIN/0.9 % SODIUM CHLORIDE 30/500 ML
PLASTIC BAG, INJECTION (ML) INTRAVENOUS
Status: DISCONTINUED
Start: 2021-05-31 | End: 2021-05-31 | Stop reason: WASHOUT

## 2021-05-31 RX ORDER — OXYTOCIN/0.9 % SODIUM CHLORIDE 30/500 ML
1-24 PLASTIC BAG, INJECTION (ML) INTRAVENOUS CONTINUOUS
Status: DISCONTINUED | OUTPATIENT
Start: 2021-05-31 | End: 2021-06-03 | Stop reason: HOSPADM

## 2021-05-31 RX ORDER — MISOPROSTOL 200 UG/1
TABLET ORAL
Status: COMPLETED
Start: 2021-05-31 | End: 2021-05-31

## 2021-05-31 RX ORDER — OXYTOCIN/0.9 % SODIUM CHLORIDE 30/500 ML
340 PLASTIC BAG, INJECTION (ML) INTRAVENOUS CONTINUOUS PRN
Status: DISCONTINUED | OUTPATIENT
Start: 2021-05-31 | End: 2021-06-03 | Stop reason: HOSPADM

## 2021-05-31 RX ORDER — AMOXICILLIN 250 MG
2 CAPSULE ORAL 2 TIMES DAILY
Status: DISCONTINUED | OUTPATIENT
Start: 2021-05-31 | End: 2021-06-03 | Stop reason: HOSPADM

## 2021-05-31 RX ORDER — BISACODYL 10 MG
10 SUPPOSITORY, RECTAL RECTAL DAILY PRN
Status: DISCONTINUED | OUTPATIENT
Start: 2021-06-02 | End: 2021-06-03 | Stop reason: HOSPADM

## 2021-05-31 RX ORDER — ONDANSETRON 4 MG/1
4 TABLET, ORALLY DISINTEGRATING ORAL EVERY 6 HOURS PRN
Status: DISCONTINUED | OUTPATIENT
Start: 2021-05-31 | End: 2021-06-03 | Stop reason: HOSPADM

## 2021-05-31 RX ORDER — OXYTOCIN 10 [USP'U]/ML
10 INJECTION, SOLUTION INTRAMUSCULAR; INTRAVENOUS
Status: DISCONTINUED | OUTPATIENT
Start: 2021-05-31 | End: 2021-06-03 | Stop reason: HOSPADM

## 2021-05-31 RX ORDER — OXYTOCIN 10 [USP'U]/ML
INJECTION, SOLUTION INTRAMUSCULAR; INTRAVENOUS
Status: DISCONTINUED
Start: 2021-05-31 | End: 2021-05-31 | Stop reason: WASHOUT

## 2021-05-31 RX ORDER — SODIUM CHLORIDE 9 MG/ML
INJECTION, SOLUTION INTRAVENOUS
Status: DISCONTINUED
Start: 2021-05-31 | End: 2021-05-31 | Stop reason: HOSPADM

## 2021-05-31 RX ORDER — LIDOCAINE/PRILOCAINE 2.5 %-2.5%
CREAM (GRAM) TOPICAL
Status: DISCONTINUED | OUTPATIENT
Start: 2021-05-31 | End: 2021-05-31

## 2021-05-31 RX ORDER — FENTANYL CITRATE 50 UG/ML
INJECTION, SOLUTION INTRAMUSCULAR; INTRAVENOUS PRN
Status: DISCONTINUED | OUTPATIENT
Start: 2021-05-31 | End: 2021-05-31

## 2021-05-31 RX ORDER — LIDOCAINE HYDROCHLORIDE 20 MG/ML
JELLY TOPICAL
Status: COMPLETED
Start: 2021-05-31 | End: 2021-05-31

## 2021-05-31 RX ORDER — LIDOCAINE HYDROCHLORIDE 20 MG/ML
JELLY TOPICAL EVERY 4 HOURS PRN
Status: DISCONTINUED | OUTPATIENT
Start: 2021-05-31 | End: 2021-06-03 | Stop reason: HOSPADM

## 2021-05-31 RX ORDER — MODIFIED LANOLIN
OINTMENT (GRAM) TOPICAL
Status: DISCONTINUED | OUTPATIENT
Start: 2021-05-31 | End: 2021-06-03 | Stop reason: HOSPADM

## 2021-05-31 RX ORDER — LIDOCAINE 40 MG/G
CREAM TOPICAL
Status: DISCONTINUED | OUTPATIENT
Start: 2021-05-31 | End: 2021-06-03 | Stop reason: HOSPADM

## 2021-05-31 RX ORDER — ONDANSETRON 2 MG/ML
4 INJECTION INTRAMUSCULAR; INTRAVENOUS EVERY 6 HOURS PRN
Status: DISCONTINUED | OUTPATIENT
Start: 2021-05-31 | End: 2021-06-03 | Stop reason: HOSPADM

## 2021-05-31 RX ORDER — LIDOCAINE HYDROCHLORIDE 10 MG/ML
INJECTION, SOLUTION EPIDURAL; INFILTRATION; INTRACAUDAL; PERINEURAL
Status: DISCONTINUED
Start: 2021-05-31 | End: 2021-05-31 | Stop reason: WASHOUT

## 2021-05-31 RX ORDER — SODIUM CHLORIDE 9 MG/ML
INJECTION, SOLUTION INTRAVENOUS
Status: COMPLETED
Start: 2021-05-31 | End: 2021-05-31

## 2021-05-31 RX ORDER — CYCLOBENZAPRINE HCL 5 MG
10 TABLET ORAL 3 TIMES DAILY
Status: DISCONTINUED | OUTPATIENT
Start: 2021-05-31 | End: 2021-06-01

## 2021-05-31 RX ORDER — OXYTOCIN/0.9 % SODIUM CHLORIDE 30/500 ML
100 PLASTIC BAG, INJECTION (ML) INTRAVENOUS CONTINUOUS
Status: DISCONTINUED | OUTPATIENT
Start: 2021-05-31 | End: 2021-06-03 | Stop reason: HOSPADM

## 2021-05-31 RX ORDER — LIDOCAINE/PRILOCAINE 2.5 %-2.5%
CREAM (GRAM) TOPICAL ONCE
Status: COMPLETED | OUTPATIENT
Start: 2021-05-31 | End: 2021-05-31

## 2021-05-31 RX ORDER — HYDROCORTISONE 2.5 %
CREAM (GRAM) TOPICAL 3 TIMES DAILY PRN
Status: DISCONTINUED | OUTPATIENT
Start: 2021-05-31 | End: 2021-06-03 | Stop reason: HOSPADM

## 2021-05-31 RX ORDER — EPHEDRINE SULFATE 50 MG/ML
5 INJECTION, SOLUTION INTRAMUSCULAR; INTRAVENOUS; SUBCUTANEOUS
Status: DISCONTINUED | OUTPATIENT
Start: 2021-05-31 | End: 2021-06-03 | Stop reason: HOSPADM

## 2021-05-31 RX ORDER — SODIUM CHLORIDE 9 MG/ML
INJECTION, SOLUTION INTRAVENOUS CONTINUOUS
Status: DISCONTINUED | OUTPATIENT
Start: 2021-05-31 | End: 2021-06-03 | Stop reason: HOSPADM

## 2021-05-31 RX ORDER — TRANEXAMIC ACID 10 MG/ML
1 INJECTION, SOLUTION INTRAVENOUS EVERY 30 MIN PRN
Status: DISCONTINUED | OUTPATIENT
Start: 2021-05-31 | End: 2021-06-03 | Stop reason: HOSPADM

## 2021-05-31 RX ORDER — ACETAMINOPHEN 325 MG/1
650 TABLET ORAL EVERY 4 HOURS PRN
Status: DISCONTINUED | OUTPATIENT
Start: 2021-05-31 | End: 2021-06-03 | Stop reason: HOSPADM

## 2021-05-31 RX ADMIN — ACETAMINOPHEN 650 MG: 325 TABLET, FILM COATED ORAL at 16:37

## 2021-05-31 RX ADMIN — FENTANYL CITRATE 100 MCG: 50 INJECTION INTRAMUSCULAR; INTRAVENOUS at 04:45

## 2021-05-31 RX ADMIN — OMEPRAZOLE 20 MG: 20 CAPSULE, DELAYED RELEASE ORAL at 08:16

## 2021-05-31 RX ADMIN — LIDOCAINE AND PRILOCAINE: 25; 25 CREAM TOPICAL at 01:30

## 2021-05-31 RX ADMIN — Medication 10 ML/HR: at 05:59

## 2021-05-31 RX ADMIN — LIDOCAINE HYDROCHLORIDE 10 ML: 20 JELLY TOPICAL at 11:45

## 2021-05-31 RX ADMIN — DIPHENHYDRAMINE HYDROCHLORIDE 25 MG: 50 INJECTION, SOLUTION INTRAMUSCULAR; INTRAVENOUS at 01:35

## 2021-05-31 RX ADMIN — ACETAMINOPHEN 650 MG: 325 TABLET, FILM COATED ORAL at 22:26

## 2021-05-31 RX ADMIN — FENTANYL CITRATE 100 MCG: 50 INJECTION INTRAMUSCULAR; INTRAVENOUS at 01:10

## 2021-05-31 RX ADMIN — Medication 10 ML/HR: at 11:54

## 2021-05-31 RX ADMIN — Medication 340 ML/HR: at 15:22

## 2021-05-31 RX ADMIN — Medication 8 ML/HR: at 06:15

## 2021-05-31 RX ADMIN — SODIUM CHLORIDE 250 ML: 9 INJECTION, SOLUTION INTRAVENOUS at 14:01

## 2021-05-31 RX ADMIN — FENTANYL CITRATE 12 MCG: 50 INJECTION, SOLUTION INTRAMUSCULAR; INTRAVENOUS at 06:12

## 2021-05-31 RX ADMIN — TRANEXAMIC ACID 1 G: 10 INJECTION, SOLUTION INTRAVENOUS at 15:37

## 2021-05-31 RX ADMIN — Medication 6 ML: at 06:12

## 2021-05-31 RX ADMIN — DOCUSATE SODIUM 50MG AND SENNOSIDES 8.6MG 1 TABLET: 8.6; 5 TABLET, FILM COATED ORAL at 20:00

## 2021-05-31 RX ADMIN — LIDOCAINE AND PRILOCAINE: 25; 25 CREAM TOPICAL at 04:45

## 2021-05-31 RX ADMIN — DIPHENHYDRAMINE HYDROCHLORIDE 25 MG: 25 CAPSULE ORAL at 08:02

## 2021-05-31 RX ADMIN — Medication 250 ML: at 14:01

## 2021-05-31 RX ADMIN — OXYCODONE HYDROCHLORIDE AND ACETAMINOPHEN 1 TABLET: 5; 325 TABLET ORAL at 17:29

## 2021-05-31 RX ADMIN — MISOPROSTOL 400 MCG: 200 TABLET ORAL at 15:25

## 2021-05-31 RX ADMIN — DIPHENHYDRAMINE HYDROCHLORIDE 25 MG: 50 INJECTION, SOLUTION INTRAMUSCULAR; INTRAVENOUS at 16:28

## 2021-05-31 RX ADMIN — NALBUPHINE HYDROCHLORIDE: 10 INJECTION, SOLUTION INTRAMUSCULAR; INTRAVENOUS; SUBCUTANEOUS at 10:47

## 2021-05-31 RX ADMIN — SODIUM CHLORIDE, POTASSIUM CHLORIDE, SODIUM LACTATE AND CALCIUM CHLORIDE: 600; 310; 30; 20 INJECTION, SOLUTION INTRAVENOUS at 10:38

## 2021-05-31 RX ADMIN — Medication 1 MILLI-UNITS/MIN: at 04:59

## 2021-05-31 RX ADMIN — Medication 25 MCG: at 01:52

## 2021-05-31 RX ADMIN — SODIUM CHLORIDE, POTASSIUM CHLORIDE, SODIUM LACTATE AND CALCIUM CHLORIDE: 600; 310; 30; 20 INJECTION, SOLUTION INTRAVENOUS at 00:06

## 2021-05-31 RX ADMIN — SODIUM CHLORIDE, POTASSIUM CHLORIDE, SODIUM LACTATE AND CALCIUM CHLORIDE 1000 ML: 600; 310; 30; 20 INJECTION, SOLUTION INTRAVENOUS at 00:09

## 2021-05-31 NOTE — PROVIDER NOTIFICATION
05/31/21 0255   Vaginal Exam   Method sterile exam per physician   Cervical Dilation (cm) 3   Cervical Effacement 80%   Fetal Station -3   Additional Documentation Cardona Score (Group)   Dr. Lepe at bedside for SVE. Cardona score 7, plan to start pitocin.     Ne-ne is uncomfortable again as fentanyl only provided relief for 45 min. Plan to reapply lidocaine cream, give fentanyl and call anesthesia again for epidural evaluation/placement.    Pt is out of town taking care of her father, she said she has a rash all over her face, chest and they are increasing everyday. pls call patient and she said her and dr. Alisha Sam have a long history and would like something to help this.  I advised with her b

## 2021-05-31 NOTE — PROGRESS NOTES
Labor Progress Note     S: feeling less ctx with epidural but noting heavy/difficult to move right > left upper thigh        O:  /69   Pulse 94   Temp 98.7  F (37.1  C) (Oral)   Resp 18   Ht 1.524 m (5')   Wt 140.2 kg (309 lb 1.4 oz)   SpO2 99%   BMI 60.36 kg/m      I/O this shift:  In: -   Out: 550 [Urine:550]     Exam    Cervix: 3.5/70/-2, anterior    FHTs: 150s, mod dia    Flippin: difficult to monitor    A/P:  AROM clear fluid  Cont oxytocin    Chaya Hill MD MPH

## 2021-05-31 NOTE — PROGRESS NOTES
05/31/21 0520   Uterine Activity Assessment   Method Nasim monitor;external tocotransducer  (placed EFM @ 0520)   Contraction Pattern irregular   Contraction Frequency (Minutes) 3-12   Contraction Duration (seconds) 40-50   Contraction Intensity mild by palpation   Uterine Resting Tone soft by palpation   Fetal Assessment   Fetal HR Assessment Method external US;nasim  (placed EFM @ 0520)   Fetal HR (beats/min) 145  (short tracing [id number], see note)   Fetal Heart Baseline Rate normal range   Fetal HR Variability moderate (amplitude range 6 to 25 bpm)   Fetal HR Accelerations other (see comments)   Fetal HR Decelerations other (see comments)     Switched from Nasim to EFMs @ 0235. Still not tracing well, decision w/ pt to go back to nasim with new electrodes @ 0255.  Traced well 8620-0624, Pt. Up to bathroom. Nasim unable to reestablish connection. Nasim monitor exchanged for a different Nasim @ 0329.   Traced well until 0400. Pt up to bathroom again, only able to establish spotty connection upon return. Tracing uterine activity well, but only occasional FHR. Continued to troubleshoot. Decision to go back to EFM again. 2nd RN present in room @ 0520 to assist in placing EFMs.

## 2021-05-31 NOTE — PROGRESS NOTES
Labor Progress Note    S: Complains of lower abdominal cramping with contractions and vaginal pain/irritaiton.     O:   Patient Vitals for the past 24 hrs:   BP Temp Temp src Resp SpO2   21 1301 -- 97.8  F (36.6  C) Oral -- --   21 1159 103/51 99  F (37.2  C) Oral -- 96 %   21 1149 115/58 -- -- 18 --   21 1142 113/53 -- -- 18 99 %   21 1139 116/53 -- -- 18 --   21 1126 -- 98.9  F (37.2  C) Oral -- --   21 1006 114/69 98.7  F (37.1  C) Oral 18 --   21 0823 118/54 -- -- 18 99 %   21 0740 118/53 97.7  F (36.5  C) Oral 18 --   21 0700 111/57 98.7  F (37.1  C) Oral 20 100 %   21 0654 108/51 -- -- 20 --   21 0649 101/53 -- -- 20 --   21 0644 109/53 -- -- 20 --   21 0639 108/53 -- -- 20 --   21 0636 119/54 -- -- 20 --   21 0634 122/56 -- -- 20 --   21 0633 125/56 -- -- -- --   21 0626 97/54 -- -- -- --   21 0624 108/51 -- -- -- --   21 0621 106/54 -- -- -- --   21 0619 106/55 -- -- -- --   21 0614 116/59 -- -- -- --   21 0542 115/55 -- -- -- --   21 0223 110/53 98.8  F (37.1  C) Oral -- 100 %   21 2353 117/56 98.2  F (36.8  C) Oral 18 --   21 1855 114/55 98.6  F (37  C) Oral 18 --   21 1530 110/60 98.1  F (36.7  C) Oral 18 --        Gen: NAD  SVE: 8 (large maternal right/anterior lip)/90/-1, IUPC replaced    Membranes: intact    FHR Baseline 150, moderate variability, no accels, recurrent variable decels since 1300  [place]: 3 ctx in 10 minutes    A/P:  Morgan Mcbride is a 30 year old  at 36w4d admitted for IOL for worsening pain in the setting of choledocholithiasis.   Pregnancy notable for: CHTN, JEFFREY, depression, class III obesity, hx rou-en-y gastric bypass     # Labor:   - S/p cervical ripening with miso  - Augmentation with pitocin, currently running at 10 mU/min, titrate to adequate contractions  - Pain: Epidural   - GBS neg, antibiotics not  indicated    # CHTN:  - BP normotensive   - No PTA meds  - HELLP wnl, UPC 0.21    # Fetal well being:  - Category II due to recurrent variable decels. Overall reassured by moderate variability. Will begin amnio infusion given recurrent variable decels for 30 minutes and continue to monitor. Reposition to maternal right.   - Continuous fetal monitoring  - Intrauterine resuscitative measures PRN  - NICU for delivery   - EFW 37%ile, Rh pos, Placenta posterior    # PPH Risk: High (pit, HTN)- IV, T&S    Fantasma Enrique MD  Ob/Gyn Resident, PGY-2  05/31/21 1:56 PM

## 2021-05-31 NOTE — PLAN OF CARE
Anesthesia consulted @ 0130  Lidocaine applied. Back highly sensitive to touch.   Plan to give IV fentanyl while lidocaine becomes effective. Able to sleep, benadryl also given.  Anesthesia unavailable to reassess.  Awake with pain after 45 min, having trouble monitoring baby. Trouble shooting Liz and using doppler and EFM to assess fetal status.   Dr. Lepe at bedside for assessment @ 0255. Plan to start pitocin. Will reapply topical lidocaine and give 2nd dose fentanyl before starting pitocin.   Plan to call anesthesia for assessment and possible epidural placement 30 min after lidocaine application and while fentanyl is effective.

## 2021-05-31 NOTE — PROGRESS NOTES
[facility]  Labor Progress Note    S:  Patient feeling uncomfortable       O:   Vitals:    21 1530 21 1855 21 2353 21 0223   BP: 110/60 114/55 117/56 110/53   Pulse:       Resp: 18 18 18    Temp: 98.1  F (36.7  C) 98.6  F (37  C) 98.2  F (36.8  C) 98.8  F (37.1  C)   TempSrc: Oral Oral Oral Oral   SpO2:    100%   Weight:       Height:             SVE: 380/-3    FHT: Difficult to trace, baseline 135, mod variability, present accelerations, absent decelerations  [place]: Difficult to trace/uterine irritability    Assessment and Plan:  Ms. Morgan Mcbride is a 30 year old  at 36w4d by 16w2d US, here for IOL in the setting of worsening pain due to choledocholithiasis, no acute process on CT. Pregnancy otherwise complicated by chronic HTN.    Labor:   SVE: 3/80/-3  Membranes: intact  Pain: uncomfortable, desiring epidural  Plan: s/p oral miso X 4, lynch's score 7 in multip, will start pitocin per protocol    FWB: Category I FHT  Continuous FHT and toco, plan to place internals to improve tracing once comfortable and ruptured with epidural  NICU for delivery    Jessika Lepe MD  Obstetrics and Gynecology PGY-2  1150 PM 2021

## 2021-05-31 NOTE — PROGRESS NOTES
05/31/21 0630   Uterine Activity Assessment   Method external tocotransducer;per patient report   Contraction Pattern irregular   Contraction Frequency (Minutes) occasional  (per patient report 2ctx during epidural placement)   Fetal Assessment   Fetal HR Assessment Method external US   Fetal HR (beats/min)   (unable to determine)   Fetal Heart Baseline Rate other (see comments)  (unable to determine)   Fetal HR Variability other (see comments)  (unable to determine)   Fetal HR Accelerations other (see comments)  (unable to determine)   Fetal HR Decelerations other (see comments)  (unable to determine)   Positioned for epidural. Unable to trace FHR or uterine activity. Reporting pain from occasional contractions, about 1 in 10 min.

## 2021-05-31 NOTE — PLAN OF CARE
More comfortable after epidural. Feeling like right side is getting more numb than left. Turned to left side and EFMs adjusted. TOCO showing no contractions, pitocin @ 1mL/hr. TOCO adjusted, will continue to assess uterine activity and verify with palpation.

## 2021-05-31 NOTE — PROGRESS NOTES
Labor Progress Note    S: Comfortable with epidural. Open to placement of internal monitors.     O:   Patient Vitals for the past 24 hrs:   BP Temp Temp src Resp SpO2   21 1006 114/69 98.7  F (37.1  C) Oral 18 --   21 0823 118/54 -- -- 18 99 %   21 0740 118/53 97.7  F (36.5  C) Oral 18 --   21 0700 111/57 98.7  F (37.1  C) Oral 20 100 %   21 0654 108/51 -- -- 20 --   21 0649 101/53 -- -- 20 --   21 0644 109/53 -- -- 20 --   21 0639 108/53 -- -- 20 --   21 0636 119/54 -- -- 20 --   21 0634 122/56 -- -- 20 --   21 0633 125/56 -- -- -- --   21 0626 97/54 -- -- -- --   21 0624 108/51 -- -- -- --   21 0621 106/54 -- -- -- --   21 0619 106/55 -- -- -- --   21 0614 116/59 -- -- -- --   21 0542 115/55 -- -- -- --   21 0223 110/53 98.8  F (37.1  C) Oral -- 100 %   21 2353 117/56 98.2  F (36.8  C) Oral 18 --   21 1855 114/55 98.6  F (37  C) Oral 18 --   21 1530 110/60 98.1  F (36.7  C) Oral 18 --   21 1200 -- -- -- -- 100 %   21 1145 -- -- -- -- 100 %   21 1130 -- -- -- -- 100 %        Gen: NAD  SVE: 3.5/80/-2, FSE, IUPC placed     Membranes: intact    FHR Baseline 145, moderate variability, no accels, intermittent variable decels  [place]: not tracing, IUPC placed     A/P:  Morgan Mcbride is a 30 year old  at 36w4d admitted for IOL for worsening pain in the setting of choledocholithiasis.   Pregnancy notable for: CHTN, JEFFREY, depression, class III obesity, hx rou-en-y gastric bypass     # Labor:   - S/p cervical ripening with miso  - Augmentation with pitocin, titrate to adequate contractions   - Pain: Epidural   - GBS neg, antibiotics not indicated    # CHTN:  - BP normotensive   - No PTA meds  - HELLP wnl, UPC 0.21    # Fetal well being:  - Category I, moderate variability  - Continuous fetal monitoring  - Intrauterine resuscitative measures PRN  - NICU for delivery   -  EFW 37%ile, Rh pos, Placenta posterior    # PPH Risk: High (pit, HTN)- IV, T&S    Fantasma Enrique MD  Ob/Gyn Resident, PGY-2  05/31/21 11:21 AM

## 2021-05-31 NOTE — PROGRESS NOTES
[facility]  Labor Progress Note    S:  Patient feeling uncomfortable, wants epidural      O:   Vitals:    21 1145 21 1200 21 1530 21 1855   BP:   110/60 114/55   Pulse:       Resp:   18 18   Temp:   98.1  F (36.7  C) 98.6  F (37  C)   TempSrc:   Oral Oral   SpO2: 100% 100%     Weight:       Height:             SVE: 2.5/80/-3    FHT: Baseline 135, mod variability, present accelerations, absent decelerations  [place]: Difficult to trace/uterine irritability    Assessment and Plan:  Ms. Morgan Mcbride is a 30 year old  at 36w3d by 16w2d US, here for IOL in the setting of worsening pain due to choledocholithiasis, no acute process on CT. Pregnancy otherwise complicated by chronic HTN.    Labor:   SVE: 2.5/80/-3 on admission  Membranes: intact  Pain: getting epidural soon  Plan: s/p oral miso X 3, continue q2h prn. Will assess after 2-3 more doses    FWB: Category I FHT  Continuous FHT and toco    Jessika Lepe MD  Obstetrics and Gynecology PGY-2  1150 PM 2021

## 2021-05-31 NOTE — L&D DELIVERY NOTE
Delivery Summary    Morgan Mcbride MRN# [id number]   Age: 30 year old YOB: 1990     Delivery Summary:   Morgan Mcbride is a 30 year old  female who was admitted with worsening abdominal pain, nausea and vomiting in the setting of known symptomatic choledocholithiasis. She has a prior history of viry-en-Y bypass and chronic HTN.  Due to worsening symptoms, she was recommended to undergo induction of labor. She had a CT on admission which showed no emergent surgical findings. GBS negative; did not require penicillin.  She had an epidural placed for pain control. Labor was induced with misoprostol and augmented with pitocin. She underwent AROM with clear fluid. She progressed to complete dilation and began pushing immediately. She pushed over two contractions with good maternal effort and delivered a liveborn female infant from OA position at 1516. APGARs 9 and 9 at 1 and 5 minutes. Weight pending at the time of note. Cord allowed to pulse for up to 1 minute, then clamped and cut. Placenta delivered spontaneously with gentle cord traction and suprapubic countertraction; intact 3V cord, placenta intact. IV pitocin was given. Perineum examined and a small right periurethral laceration was noted that was hemostatic with pressure. She had some additional bleeding. Due to difficulty with fundal massage secondary to habitus, she received 400mcg sublingual misoprostol and TXA. QBL 100cc. Perineum hemostatic.      Ellen Saab MD   Resident Physician, PGY4  Obstetrics, Gynecology, and Women's Health    I was present during the delivery and supervised the resident perform the patient's .    Chaya Hill MD MPH             Reed, Female-Morgan [[id number]]    Labor Event Times    Labor onset date: 21 Onset time:  5:00 AM   Dilation complete date: 21 Complete time:  2:52 PM   Start pushing date/time: 2021 1453      Labor Events     labor?: No    steroids: Full Course  Labor Type: Induction/Cervical ripening, AROM  Predominate monitoring during 1st stage: continuous electronic fetal monitoring     Antibiotics received during labor?: No     Rupture date/time:  1019   Rupture type: Artificial Rupture of Membranes  Fluid color: Clear  Fluid odor: Normal     Induction: Misoprostol, Oxytocin, AROM  Induction date/time:     Cervical ripening date/time: 5/30/21 1850      Augmentation: Oxytocin, AROM  1:1 continuous labor support provided by?: RN       Delivery/Placenta Date and Time    Delivery Date: 5/31/21 Delivery Time:  3:16 PM   Placenta Date/Time: 5/31/2021  3:21 PM  Oxytocin given at the time of delivery: after delivery of placenta  Delivering clinician: Ellen Saab MD   Other personnel present at delivery:  Provider Role   Chaya Hill MD Obstetrician   Melissa Stephens RN Registered Nurse   Sergio Rizvi RN Registered Nurse   Ellen Saab MD Resident    Medical Student         Vaginal Counts     Initial count performed by 2 team members:  Two Team Members   Melissa Hill        Needles Suture Needles Sponges (RETIRED) Instruments   Initial counts 2 0 5    Added to count 0 0 0    Relief counts       Final counts 2 0 5          Placed during labor Accounted for at the end of labor   FSE NA NA   IUPC NA NA   Cervadil NA NA              Final count performed by 2 team members:  Two Team Members   Melissa Hill      Final count correct?: Yes     Apgars    Living status: Living   1 Minute 5 Minute 10 Minute 15 Minute 20 Minute   Skin color: 1  1       Heart rate: 2  2       Reflex irritability: 2  2       Muscle tone: 2  2       Respiratory effort: 2  2       Total: 9  9       Apgars assigned by: MELISSA STEPHENS RN     Cord    Vessels: 3 Vessels    Cord Complications: None               Cord Blood Disposition: Lab    Gases Sent?: No    Delayed cord clamping?: Yes    Cord Clamping Delay (seconds):  seconds    Stem cell  collection?: No       [place] Resuscitation    Methods: None   Care at Delivery:  delivered vaginally, placed on mother's abdomen and dried. Spontaneous cry, good tone, pinking up color. NICU arrived 2 min after delivery and were sent away.  stable.      Skin to Skin and Feeding Plan    Skin to skin initiation date/time: 1841    Skin to skin with: Mother  Skin to skin end date/time:        Labor Events and Shoulder Dystocia    Fetal Tracing Prior to Delivery: Category 2  Shoulder dystocia present?: Neg     Delivery (Maternal) (Provider to Complete) ([id number])    Episiotomy: None  Perineal lacerations: None    Genital tract inspection done: Pos     Blood Loss  Mother: Reed Althea S #[id number]   Start of Mother's Information    IO Blood Loss  21 0500 - 21 1615    Delivery QBL (mL) Hospital Encounter 140 mL    Total  140 mL         End of Mother's Information  Mother: Reed Althea S #[id number]          Delivery - Provider to Complete ([id number])    Delivering clinician: Ellen Saab MD  Attempted Delivery Types (Choose all that apply): Spontaneous Vaginal Delivery  Delivery Type (Choose the 1 that will go to the Birth History): Vaginal, Spontaneous                   Other personnel:  Provider Role   Chaya Hill MD Obstetrician   Melissa Guy, RN Registered Nurse   Sergio Rizvi RN Registered Nurse   Ellen Saab MD Resident    Medical Student                Placenta    Date/Time: 2021  3:21 PM  Removal: Spontaneous  Disposition: Hospital disposal           Anesthesia    Method: Epidural                Presentation and Position    Position: Middle Occiput Anterior                 Ellen Saab MD

## 2021-05-31 NOTE — PLAN OF CARE
Epidural in place, much more comfortable. Hoping to catch up on much needed sleep.   Pitocin running @ 2, TOCO not tracing any contractions. Will titrate and adjust monitor as indicated.  Requesting benadryl for itching, will become available @ 7297.  Using EFM instead of Liz, FHR was difficult to trace overnight, see multiple notes.   VSS, afebrile.   Some labor progress overnight. Last check 0255 3/80/-3. Will use internal monitors when possible.

## 2021-05-31 NOTE — PROGRESS NOTES
[facility] [place]  Labor Progress Note    S:  Patient feeling very crampy, unsure if uterine or from gallbladder. Otherwise well.      O:   Patient Vitals for the past 4 hrs:   BP Temp Temp src Resp   21 1855 114/55 98.6  F (37  C) Oral 18   21 1530 110/60 98.1  F (36.7  C) Oral 18       SVE: deferred    FHT: Baseline 135, mod variability, present accelerations, absent decelerations  [place]: Difficult to trace/uterine irritability    Assessment and Plan:  Ms. Morgan Mcbride is a 30 year old  at 36w3d by 16w2d US, here for IOL in the setting of worsening pain due to choledocholithiasis, no acute process on CT. Pregnancy otherwise complicated by chronic HTN.    Labor:   SVE: 1-2/80/-3 on admission  Membranes: intact  Pain: comfortable  Plan: s/p oral miso X 1 at 1850, continue q2h prn. Assess after several doses for balloon vs pitocin    FWB: Category I FHT  Continuous FHT and toco    Jessika Lepe MD  Obstetrics and Gynecology PGY-2  730 PM 2021

## 2021-05-31 NOTE — DISCHARGE SUMMARY
[facility] Discharge Summary    Morgan Mcbride MRN# [id number]   Age: 30 year old YOB: 1990     Date of Admission:  2021  Date of Discharge:  6/3/2021  Admitting Physician:  María Hunter MD  Discharge Physician:  María Hunter MD    Admit Dx:   - Intrauterine pregnancy at 36w4d   - Symptomatic choledocholithiasis  - Chronic HTN   - hx Viry-en-Y bypass     Discharge Dx:  - Same as above, s/p     Procedures:  - Spontaneous vaginal delivery  - Epidural analgesia    Admit HPI/Labor Course:  Morgan Mcbride is a 30 year old  female who was admitted with worsening abdominal pain, nausea and vomiting in the setting of known symptomatic choledocholithiasis. She has a prior history of viry-en-Y bypass, and chronic HTN.  Due to worsening symptoms, she was recommended to undergo induction of labor. She had a CT on admission which showed no emergent surgical findings. GBS negative; did not require penicillin.  She had an epidural placed for pain control. Labor was induced with misoprostol and augmented with pitocin. She underwent AROM with clear fluid. She progressed to complete dilation and began pushing immediately. She pushed over two contractions with good maternal effort and delivered a liveborn female infant from OA position at 1516. APGARs 9 and 9 at 1 and 5 minutes. Weight pending at the time of note. Cord allowed to pulse for up to 1 minute, then clamped and cut. Placenta delivered  spontaneously with gentle cord traction and suprapubic countertraction; intact 3V cord, placenta intact. IV pitocin was given. Perineum examined and a small right periurethral laceration was noted that was hemostatic with pressure. She had some additional bleeding. Due to difficulty with fundal massage secondary to habitus, she received 400mcg sublingual misoprostol and TXA. QBL 100cc. Perineum hemostatic.       Please see her Admission H&P and Delivery  Summary for further details.    Postpartum Course:  Her postpartum course was complicated by worsening pain. A repeat CT scan on PPD#2 showed no acute finidngs. Her pain improved by PPD#3.     On PPD#3, she was meeting all of her postpartum goals and deemed stable for discharge. She was voiding without difficulty, tolerating a regular diet without nausea and vomiting, her pain was well controlled on oral pain medicines and her lochia was appropriate. Her hemoglobin after delivery was 10.5. Her Rh status was pos, and Rhogam was not indicated.     Discharge Medications:     Review of your medicines      START taking      Dose / Directions   acetaminophen 325 MG tablet  Commonly known as: TYLENOL  Used for:  (normal spontaneous vaginal delivery)  Replaces: Acetaminophen 325 MG Caps      Dose: 650 mg  Take 2 tablets (650 mg) by mouth every 4 hours as needed for mild pain  Quantity: 45 tablet  Refills: 0     SENNA-docusate sodium 8.6-50 MG tablet  Commonly known as: SENNA S  Used for:  (normal spontaneous vaginal delivery)      Dose: 1 tablet  Take 1 tablet by mouth At Bedtime  Quantity: 30 tablet  Refills: 0        CONTINUE these medicines which have NOT CHANGED      Dose / Directions   buPROPion 100 MG 12 hr tablet  Commonly known as: WELLBUTRIN SR  Used for: Class 3 drug-induced obesity with serious comorbidity and body mass index (BMI) of 50.0 to 59.9 in adult (H)      Dose: 100 mg  Take 1 tablet (100 mg) by mouth 2 times daily  Quantity: 60 tablet  Refills: 0     DULCOLAX PO      Refills: 0     hydrOXYzine 25 MG tablet  Commonly known as: ATARAX      Dose: 50 mg  Take 50 mg by mouth as needed  Refills: 0     lisinopril 5 MG tablet  Commonly known as: ZESTRIL      Dose: 5 mg  Take 5 mg by mouth  Refills: 0     * medroxyPROGESTERone 150 MG/ML IM injection  Commonly known as: DEPO-PROVERA      Refills: 0     * medroxyPROGESTERone 150 MG/ML syringe  Commonly known as: DEPO-PROVERA      Dose: 150 mg  Inject 150  mg into the muscle  Refills: 0     Melatonin 5 MG Chew      Dose: 15 mg  Take 15 mg by mouth  Refills: 0     naltrexone 50 MG tablet  Commonly known as: DEPADE/REVIA  Used for: Class 3 drug-induced obesity with serious comorbidity and body mass index (BMI) of 50.0 to 59.9 in adult (H)      Take 1/2 tablet once daily 1-2 hours prior to worst cravings for 1 week, then increase to 1 tablet daily as directed if tolerating  Quantity: 30 tablet  Refills: 1     * omeprazole 20 MG DR capsule  Commonly known as: priLOSEC      Refills: 0     * omeprazole 40 MG DR capsule  Commonly known as: priLOSEC  Used for: S/P gastric bypass, Class 3 drug-induced obesity with serious comorbidity and body mass index (BMI) of 50.0 to 59.9 in adult (H)      Dose: 40 mg  Take 1 capsule (40 mg) by mouth daily  Quantity: 30 capsule  Refills: 3     oxyCODONE 5 MG capsule  Commonly known as: OXY-IR      Dose: 10 mg  Take 10 mg by mouth every 4 hours as needed for severe pain  Refills: 0     polyethylene glycol 17 GM/Dose powder  Commonly known as: MIRALAX  Used for: S/P gastric bypass, Class 3 drug-induced obesity with serious comorbidity and body mass index (BMI) of 50.0 to 59.9 in adult (H), Constipation, unspecified constipation type      Dose: 1 capful  Take 17 g (1 capful) by mouth daily  Quantity: 527 g  Refills: 3         * This list has 4 medication(s) that are the same as other medications prescribed for you. Read the directions carefully, and ask your doctor or other care provider to review them with you.            STOP taking    Acetaminophen 325 MG Caps  Replaced by: acetaminophen 325 MG tablet              Where to get your medicines      These medications were sent to [facility] [place], [place] - [address]  [address] [address]    Phone: [phone]     acetaminophen 325 MG tablet    SENNA-docusate sodium 8.6-50 MG tablet           Discharge/Disposition:  Morgan Mcbride was  discharged to home in stable condition with the following instructions/medications:  1) Call for temperature > 100.4, bright red vaginal bleeding >1 pad an hour x 2 hours, foul smelling vaginal discharge, pain not controlled by usual oral pain meds, persistent nausea and vomiting not controlled on medications  2) She desired depo PTD and nexplanon at 6 weeks for contraception.  3) For feeding she decided to breast and bottle feed.  4) She was instructed to follow-up with her primary OB in 6 weeks for a routine postpartum visit and in 1-2 weeks with her general surgeon for laparoscopic cholecystectomy consult. She was also scheduled for MRCP as outpatient.    Neema Martinez MD  Obstetrics and Gynecology, PGY-3  6/3/2021 6:26 PM    Appreciate note by Dr. Martinez. Patient has been seen and examined by me separate from the resident, agree with above note.     María Hunter MD  5:29 PM

## 2021-05-31 NOTE — PLAN OF CARE
Nasim monitor signal weak. Reading some uterine activity and maternal pulse, but not FHR. Battery pack changed. Monitor turned off and reset. Unable to reestablish connection. Plan to remove nasim pads and return to [facility].

## 2021-05-31 NOTE — PROVIDER NOTIFICATION
05/31/21 0830   Provider Notification   Provider Name/Title Dr. Hill   Method of Notification In Department   Notification Reason Other (Comment)  (difficult to trace contractions)   Provider informed of difficult tracing contractions and pit will continue to be titrated up depending on FHR. Provider okay with plan.

## 2021-05-31 NOTE — ANESTHESIA PREPROCEDURE EVALUATION
Anesthesia Pre-Procedure Evaluation    Patient: Morgan Mcbride   MRN: [id number] : 1990        Preoperative Diagnosis: * No surgery found *   Procedure :      Past Medical History:   Diagnosis Date     Hypertension      Obesity       Past Surgical History:   Procedure Laterality Date     BIOPSY       ESOPHAGOSCOPY, GASTROSCOPY, DUODENOSCOPY (EGD), COMBINED  2012    Procedure: COMBINED ESOPHAGOSCOPY, GASTROSCOPY, DUODENOSCOPY (EGD);;  Surgeon: Chris Garcia MD;  Location: [facility]     ESOPHAGOSCOPY, GASTROSCOPY, DUODENOSCOPY (EGD), COMBINED N/A 2014    Procedure: COMBINED ESOPHAGOSCOPY, GASTROSCOPY, DUODENOSCOPY (EGD), BIOPSY SINGLE OR MULTIPLE;  Surgeon: Salvador Walter MD;  Location: [facility]     LAPAROSCOPIC BYPASS GASTRIC  2012    Procedure:LAPAROSCOPIC BYPASS GASTRIC; Laparoscopic Leticia-En-Y Gastric Bypass; Surgeon:SALVADOR WALTER; Location:[facility]     US BREAST BIOPSY RT        Allergies   Allergen Reactions     Nsaids GI Disturbance and Other (See Comments)     Hx gastric bypass      Social History     Tobacco Use     Smoking status: Never Smoker     Smokeless tobacco: Never Used   Substance Use Topics     Alcohol use: No      Wt Readings from Last 1 Encounters:   21 140.2 kg (309 lb 1.4 oz)        Anesthesia Evaluation   Pt has had prior anesthetic. Type: General.    No history of anesthetic complications       ROS/MED HX  ENT/Pulmonary:  - neg pulmonary ROS     Neurologic: Comment: Patient had leg weeakness and was told she could not have epidural in prior pregnancy. Chronically she has low back pain with shooting pains down both R>L legs. No MRI visible. Patient denies that she saw neurology during prior pregnancy.    (+) peripheral neuropathy,  (-) no Parkinson's disease   Cardiovascular:  - neg cardiovascular ROS   (+) hypertension (before pregnany. normotensive for past few months)-----    METS/Exercise Tolerance:     Hematologic:     (+) anemia,      Musculoskeletal:   (+) low back pain,     GI/Hepatic: Comment: H/o viry-en-y  symptomatic choledocholithiasis    (+) GERD,     Renal/Genitourinary:       Endo:     (+) Obesity,     Psychiatric/Substance Use:       Infectious Disease:       Malignancy:       Other:            Physical Exam    Airway        Mallampati: III   TM distance: > 3 FB   Neck ROM: full   Mouth opening: > 3 cm    Respiratory Devices and Support         Dental  no notable dental history         Cardiovascular   cardiovascular exam normal          Pulmonary   pulmonary exam normal            Other findings: Central lower  to palpation. No redness, warmth.    OUTSIDE LABS:  CBC:   Lab Results   Component Value Date    WBC 20.4 (H) 05/30/2021    WBC 8.6 10/07/2016    HGB 9.8 (L) 05/30/2021    HGB 12.0 10/07/2016    HCT 30.2 (L) 05/30/2021    HCT 37.5 10/07/2016     05/30/2021     10/07/2016     BMP:   Lab Results   Component Value Date     05/30/2021     10/07/2016    POTASSIUM 4.0 05/30/2021    POTASSIUM 4.1 10/07/2016    CHLORIDE 107 05/30/2021    CHLORIDE 106 10/07/2016    CO2 22 05/30/2021    CO2 28 10/07/2016    BUN 5 (L) 05/30/2021    BUN 12 10/07/2016    CR 0.62 05/30/2021    CR 0.86 10/07/2016    GLC 91 05/30/2021    GLC 85 10/07/2016     COAGS: No results found for: PTT, INR, FIBR  POC:   Lab Results   Component Value Date    BGM 94 05/21/2012    HCG Positive (A) 10/07/2016     HEPATIC:   Lab Results   Component Value Date    ALBUMIN 2.7 (L) 05/30/2021    PROTTOTAL 6.9 05/30/2021    ALT 14 05/30/2021    AST 17 05/30/2021    ALKPHOS 125 05/30/2021    BILITOTAL 0.2 05/30/2021     OTHER:   Lab Results   Component Value Date    A1C 5.8 12/18/2014    MIGUE 8.9 05/30/2021    PHOS 4.6 (H) 05/19/2012    MAG 2.2 05/19/2012    LIPASE 114 07/30/2012       Anesthesia Plan    ASA Status:  3      Anesthesia Type: Epidural.              Consents    Anesthesia Plan(s) and associated risks, benefits, and realistic  alternatives discussed. Questions answered and patient/representative(s) expressed understanding.     - Discussed with:  Patient         Postoperative Care            Comments:    Discussed risks of anesthesia including nausea, vomiting, headache, itching, bleeding, infection, cardiopulmonary complications, neurologic complications, and serious complications. Discussed higher than population risk for nerve injuries given her sciatica-like symptoms. We will procede with close communication. If she feels symptoms or unable to hold position, we will stop and redirect and if persistant will abandon procedure.               Brenna Marques MD

## 2021-05-31 NOTE — ANESTHESIA PROCEDURE NOTES
Epidural catheter Procedure Note  Pre-Procedure   Staff -        Anesthesiologist:  Brenna Melendez MD       Performed By: anesthesiologist       Location: OB       Pre-Anesthestic Checklist: patient identified, IV checked, risks and benefits discussed, informed consent, monitors and equipment checked, pre-op evaluation and at physician/surgeon's request  Timeout:       Correct Patient: Yes        Correct Procedure: Yes        Correct Site: Yes        Correct Position: Yes   Procedure Documentation  Procedure: epidural catheter       Diagnosis: labor pain, bmi 60, choledocholithiasis, sciatica       Patient Position: sitting       Patient Prep/Sterile Barriers: sterile gloves, mask, patient draped       Skin prep: Chloraprep       Local skin infiltrated with 3 mL of 1% lidocaine.        Insertion Site: L3-4. (midline approach).       Technique: LORT saline        LUIS CARLOS at 8 (8 with pressure, 9 when skin released) cm.       Needle Type: [employer] needle       Needle Gauge: 17.        Needle Length (Inches): 3.5        Catheter: 19 G.         Catheter threaded easily.         4.5 cm epidural space.         Threaded 13.5 cm at skin.         # of attempts: 1 and  # of redirects:  1    Assessment/Narrative         Paresthesias: Yes and Resolved (one brief paresthesia to R back).       Test dose of 3 mL lidocaine 1.5% w/ 1:200,000 epinephrine at 06:10.         Test dose negative, 3 minutes after injection, for signs of intravascular, subdural, or intrathecal injection.       Insertion/Infusion Method: LORT saline       Aspiration negative for Heme or CSF via Epidural Catheter.    Comments:  Ultrasound with sterile cover used to hamzah midline with sterile marker

## 2021-05-31 NOTE — PROGRESS NOTES
Labor Progress Note    S: Comfortable with epidural. Discussed AROM, would like to wait. Would like to take a nap now.     O:   Patient Vitals for the past 24 hrs:   BP Temp Temp src Pulse Resp SpO2 Height Weight   21 0700 -- 98.7  F (37.1  C) Oral -- -- -- -- --   21 0223 110/53 98.8  F (37.1  C) Oral -- -- 100 % -- --   21 2353 117/56 98.2  F (36.8  C) Oral -- 18 -- -- --   21 1855 114/55 98.6  F (37  C) Oral -- 18 -- -- --   21 1530 110/60 98.1  F (36.7  C) Oral -- 18 -- -- --   21 1200 -- -- -- -- -- 100 % -- --   21 1145 -- -- -- -- -- 100 % -- --   21 1130 -- -- -- -- -- 100 % -- --   21 1115 108/55 98.7  F (37.1  C) Oral -- 20 99 % 1.524 m (5') 140.2 kg (309 lb 1.4 oz)   21 1038 122/55 96.7  F (35.9  C) Oral 94 16 99 % -- --        Gen: NAD  SVE: deferred, last 3/80/-3 at 0300    Membranes: intact    FHR Baseline 135, moderate variability, no accels, no decels  Mila Doce: 1-2 contractions in 10 minutes  Tracing disconnected    A/P:  Morgan Mcbride is a 30 year old  at 36w4d admitted for IOL for worsening pain in the setting of choledocholithiasis.   Pregnancy notable for: CHTN    # Labor:   - S/p cervical ripening with miso  - Augmentation with pitocin, titrate up PRN  - Pain: Epidural   - GBS neg, antibiotics not indicated    # CHTN:  - BP normotensive   - HELLP wnl, UPC 0.21    # Fetal well being:  - Category I, moderate variability  - Continuous fetal monitoring  - Intrauterine resuscitative measures PRN  - NICU for delivery   - Plan for internal fetal monitoring s/p AROM  - EFW 37%ile, Rh pos, Placenta posterior    # PPH Risk: High (pit, HTN)- IV, T&S    Fantasma Enrique MD  Ob/Gyn Resident, PGY-2  21 7:03 AM

## 2021-05-31 NOTE — PLAN OF CARE
VSS, afebrile. Denies leaking of fluid or vaginal bleeding. Confirms normal fetal movement, see flow sheets for EFM details. Feeling pain from gallstones but improved with morphine. Feeling contractions as intense menstrual cramps, plan to check cervix and make decision for next step in pain management. Partner at bedside and is supportive. Continue current cares, anticipate .

## 2021-06-01 ENCOUNTER — TRANSCRIBE ORDERS (OUTPATIENT)
Dept: MATERNAL FETAL MEDICINE | Facility: CLINIC | Age: 31
End: 2021-06-01

## 2021-06-01 DIAGNOSIS — O26.90 PREGNANCY RELATED CONDITION, ANTEPARTUM: Primary | ICD-10-CM

## 2021-06-01 LAB
HGB BLD-MCNC: 9.8 G/DL (ref 11.7–15.7)
PLATELET # BLD AUTO: 432 10E9/L (ref 150–450)
T PALLIDUM AB SER QL: NONREACTIVE

## 2021-06-01 PROCEDURE — 250N000013 HC RX MED GY IP 250 OP 250 PS 637: Performed by: STUDENT IN AN ORGANIZED HEALTH CARE EDUCATION/TRAINING PROGRAM

## 2021-06-01 PROCEDURE — 85018 HEMOGLOBIN: CPT | Performed by: OBSTETRICS & GYNECOLOGY

## 2021-06-01 PROCEDURE — 120N000002 HC R&B MED SURG/OB UMMC

## 2021-06-01 PROCEDURE — 36415 COLL VENOUS BLD VENIPUNCTURE: CPT | Performed by: OBSTETRICS & GYNECOLOGY

## 2021-06-01 PROCEDURE — 250N000011 HC RX IP 250 OP 636: Performed by: OBSTETRICS & GYNECOLOGY

## 2021-06-01 PROCEDURE — 250N000013 HC RX MED GY IP 250 OP 250 PS 637: Performed by: OBSTETRICS & GYNECOLOGY

## 2021-06-01 PROCEDURE — 85049 AUTOMATED PLATELET COUNT: CPT | Performed by: OBSTETRICS & GYNECOLOGY

## 2021-06-01 RX ORDER — SIMETHICONE 80 MG
80 TABLET,CHEWABLE ORAL 4 TIMES DAILY
Status: DISCONTINUED | OUTPATIENT
Start: 2021-06-01 | End: 2021-06-02

## 2021-06-01 RX ORDER — HYDROXYZINE HYDROCHLORIDE 25 MG/1
25 TABLET, FILM COATED ORAL EVERY 6 HOURS PRN
Status: DISCONTINUED | OUTPATIENT
Start: 2021-06-01 | End: 2021-06-03 | Stop reason: HOSPADM

## 2021-06-01 RX ORDER — POLYETHYLENE GLYCOL 3350 17 G/17G
17 POWDER, FOR SOLUTION ORAL DAILY
Status: DISCONTINUED | OUTPATIENT
Start: 2021-06-01 | End: 2021-06-03 | Stop reason: HOSPADM

## 2021-06-01 RX ORDER — CYCLOBENZAPRINE HCL 5 MG
10 TABLET ORAL 3 TIMES DAILY
Status: DISCONTINUED | OUTPATIENT
Start: 2021-06-01 | End: 2021-06-03 | Stop reason: HOSPADM

## 2021-06-01 RX ORDER — HYDROXYZINE HYDROCHLORIDE 25 MG/1
50 TABLET, FILM COATED ORAL EVERY 6 HOURS PRN
Status: DISCONTINUED | OUTPATIENT
Start: 2021-06-01 | End: 2021-06-03 | Stop reason: HOSPADM

## 2021-06-01 RX ORDER — HYDROMORPHONE HYDROCHLORIDE 2 MG/1
2 TABLET ORAL ONCE
Status: COMPLETED | OUTPATIENT
Start: 2021-06-01 | End: 2021-06-01

## 2021-06-01 RX ADMIN — ACETAMINOPHEN 650 MG: 325 TABLET, FILM COATED ORAL at 22:29

## 2021-06-01 RX ADMIN — ACETAMINOPHEN 650 MG: 325 TABLET, FILM COATED ORAL at 12:21

## 2021-06-01 RX ADMIN — DIPHENHYDRAMINE HYDROCHLORIDE 25 MG: 25 CAPSULE ORAL at 00:02

## 2021-06-01 RX ADMIN — SIMETHICONE 80 MG: 80 TABLET, CHEWABLE ORAL at 07:49

## 2021-06-01 RX ADMIN — OMEPRAZOLE 40 MG: 20 CAPSULE, DELAYED RELEASE ORAL at 07:49

## 2021-06-01 RX ADMIN — CYCLOBENZAPRINE 10 MG: 5 TABLET, FILM COATED ORAL at 14:51

## 2021-06-01 RX ADMIN — CYCLOBENZAPRINE 10 MG: 5 TABLET, FILM COATED ORAL at 07:01

## 2021-06-01 RX ADMIN — HYDROXYZINE HYDROCHLORIDE 25 MG: 25 TABLET, FILM COATED ORAL at 02:04

## 2021-06-01 RX ADMIN — HYDROMORPHONE HYDROCHLORIDE 2 MG: 2 TABLET ORAL at 02:04

## 2021-06-01 RX ADMIN — SIMETHICONE 80 MG: 80 TABLET, CHEWABLE ORAL at 12:22

## 2021-06-01 RX ADMIN — SIMETHICONE 80 MG: 80 TABLET, CHEWABLE ORAL at 15:23

## 2021-06-01 RX ADMIN — ENOXAPARIN SODIUM 40 MG: 100 INJECTION SUBCUTANEOUS at 07:49

## 2021-06-01 RX ADMIN — HYDROXYZINE HYDROCHLORIDE 50 MG: 25 TABLET, FILM COATED ORAL at 17:49

## 2021-06-01 RX ADMIN — SIMETHICONE 80 MG: 80 TABLET, CHEWABLE ORAL at 22:28

## 2021-06-01 RX ADMIN — DOCUSATE SODIUM AND SENNOSIDES 2 TABLET: 8.6; 5 TABLET ORAL at 06:19

## 2021-06-01 RX ADMIN — ACETAMINOPHEN 650 MG: 325 TABLET, FILM COATED ORAL at 17:49

## 2021-06-01 RX ADMIN — ENOXAPARIN SODIUM 40 MG: 100 INJECTION SUBCUTANEOUS at 22:28

## 2021-06-01 RX ADMIN — CYCLOBENZAPRINE 10 MG: 5 TABLET, FILM COATED ORAL at 00:02

## 2021-06-01 RX ADMIN — ACETAMINOPHEN 650 MG: 325 TABLET, FILM COATED ORAL at 08:44

## 2021-06-01 RX ADMIN — CYCLOBENZAPRINE 10 MG: 5 TABLET, FILM COATED ORAL at 22:28

## 2021-06-01 RX ADMIN — POLYETHYLENE GLYCOL 3350 17 G: 17 POWDER, FOR SOLUTION ORAL at 07:49

## 2021-06-01 NOTE — PROGRESS NOTES
Postpartum Progress Note  DOS: 21  Morgan Mcbride  [id number]    Subjective:   Patient feeling crampy. Ambulating without dizziness. Eating and drinking without nausea or vomiting. Lochia less than a typical period. Voiding spontaneously.  Breastfeeding. Thinking about nexplanon at 6 week visit, depo PTD.    No headache, vision changes, CP, SOB    Objective:  /70   Pulse 84   Temp 99.3  F (37.4  C) (Oral)   Resp 18   Ht 1.524 m (5')   Wt 140.2 kg (309 lb 1.4 oz)   SpO2 96%   Breastfeeding Unknown   BMI 60.36 kg/m      General: NAD, comfortable  Heart: Regular rate  Lungs: Breathing comfortably, on room air  Abdomen: deferred    Assessment/Plan: 30 year old  who is PPD#1 s/p . Pregnancy was notable for choledocholithiasis, class III obesity, chronic HTN, depression. Currently stable and doing well.     Postpartum  - Continue routine post-partum cares.     - Heme: Appropriate blood loss during delivery. AM hgb ordered. Continue to monitor for s/s of anemia. Repeat labs prn.  - Pain: Continue prn tylenol  - GI: scheduled senna, prn antiemetics. Adding scheduled simethicone and miralax due to desire for BM. Discussed her asking for enema at noon if not delivered yet  - : Voiding spontaneously   - Baby:  breastfeeding  - Contraception: Nexplanon at 6 week visit, possibly depot PTD  - Rh pos, Rubella immune  - PPx: Encourage ambulation, lovenox     Choledocholithiasis  - LFTs nl, pain well-controlled, plan for gen surgeon to do lap floyd in 1-2 weeks      CHTN- no meds  - HELLP labs wnl, UPC 0.21    H/o Leticia en Y: no ibuprofen ordered    Depression-no meds  SW consulted    Dispo: Discharge to home when meeting postpartum goals    Jessika Lepe MD  Obstetrics and Gynecology PGY-2  21    Hemoglobin   Date Value Ref Range Status   2021 9.8 (L) 11.7 - 15.7 g/dL Final   2021 9.8 (L) 11.7 - 15.7 g/dL Final     Patient resting on her side.  Complains of cramping and no BM  since last Wednesday.  She has taken Miralax and morning senna/ducosate.  Feels like having a BM will help her pain significantly.  Denies heavy bleeding.  I agree with plan above.  Plan enema this afternoon if no BM.  Reviewed plan with Dr. Walter for outpatient cholecystectomy and patient is happy with that plan.    Emmie Dent MD

## 2021-06-01 NOTE — LACTATION NOTE
This note was copied from a baby's chart.  Attempted to meet with mom; she was sleeping in [facility]; postpartum RN requested to let mom sleep.  Per postpartum RN, mom not pumping consistently.  Will see mom tomorrow.

## 2021-06-01 NOTE — PLAN OF CARE
Patient arrived to [facility] unit via wheelchair at 1900, with belongings, accompanied by ZULY Hernandez, with infant in arms. Received report from ZULY Hernandez  and double checked bands. Unit and room orientation started. Call light given and within arms reach; no concerns present at this time. Continue with plan of care.

## 2021-06-01 NOTE — PROVIDER NOTIFICATION
"   06/01/21 0655   Provider Notification   Provider Name/Title MD Lepe   Method of Notification Electronic Page   Request Evaluate-Remote   Notification Reason Medication Request   Pt requesting pain medication for 10/10 pain, refusing tylenol states \"it doesn't do anything\" Please advise.  "

## 2021-06-01 NOTE — PLAN OF CARE
Data: Vital signs within normal limits.IV removed today. Postpartum checks within normal limits - see flow record. Patient eating and drinking normally. Patient able to empty bladder independently and is up ambulating. Patient was able to have a small bowel movement today, and another medium size bowel movement, given senna and mirilax, offered suppository. No apparent signs of infection. Incision healing well. Patient reported two golf ball size clots when having bowel movement; no further clots, minimal bleeding in between, informed the patient to notify nurse if further clots are noticed. Patient performing self cares and is able to go down to NICU to care for infant.   Action: Patient medicated during the shift for pain. See MAR. Patient reassessed within 1 hour after each medication and pain was improved to some degree, reports constant pain but the pain medication has helped relieve it a bit, given hot packs as well. Most effective was 50mg atarax and 650mg of tylenol together. Patient education done about using breast pump to try to help stimulate milk production, patient attempted to pump X1 today, lasted a few minutes and stated it was too painful. Offered hydrogels and lanolin cream, patient accepted the cream and stated she would try later, although has not attempted despite much encouragement.   Response: Mom and dad went down to NICU this afternoon with baby. Support persons father present.   Plan: Patient encouraged to fill out edinburgh, BC and ROP, also encouraged to watch required videos, has not completed today. Anticipate discharge on 06/02.

## 2021-06-01 NOTE — PROVIDER NOTIFICATION
05/31/21 2153   Provider Notification   Provider Name/Title MD Lepe   Method of Notification Electronic Page   Request Evaluate-Remote   Notification Reason Medication Request   pt c/o cramping pain. refused to take tylenol or dilaudid. requesting for oxycodone and muscle relaxant. would you like to place order for the requested meds? thanks [phone]

## 2021-06-01 NOTE — PROVIDER NOTIFICATION
MD Notification    Notified Person: MD    Notified Person Name: Roberth MD    Notification Date/Time: 6/1/21 0143    Notification Interaction: Electronic page      Pt requesting PO dilaudid, does not want IV meds if possible. Also requested something for sleep. Thank you!

## 2021-06-01 NOTE — PLAN OF CARE
"Postpartum recovery WNL. Bleeding 93cc QBL after initial delivery QBL, VSS. Pt able to void. Pain is controlled with tylenol and 1 percoset, states she has mostly cramping but not having as much gallbladder pain and that she \"feels so much better\". Bonding well with baby.     Data: Morgan Mcbride transferred to 71 via wheelchair at 1850. Baby transferred via parent's arms.  Action: Receiving unit notified of transfer: Yes. Patient and family notified of room change. Report given to Loyda RN at 1800, updated at bedside. Belongings sent to receiving unit. Accompanied by Registered Nurse. Oriented patient to surroundings. Call light within reach. ID bands double-checked with receiving RN.  Response: Patient tolerated transfer and is stable.  "

## 2021-06-01 NOTE — PROGRESS NOTES
This writer made two attempts to meet with parents at bedside in [facility].  Both times they were sleeping and not available to meet.  SW will complete psychosocial assessment Wednesday morning.     Oneida JOHNSONW, MSW, [facility] HospitalSW  Maternal Child Health

## 2021-06-01 NOTE — PLAN OF CARE
Data: Vital signs within normal limits. Postpartum checks within normal limits - see flow record. Patient eating and drinking normally. Patient able to empty bladder independently and is up ambulating. No apparent signs of infection.  Periurethral laceration  healing well. Patient performing self cares.  Action: Patient medicated during the shift for pain and cramping. See MAR. Patient reassessed within 1 hour after each medication and pain was improved.  Patient education done. See flow record. Encouraged patient to pump or hand express and educated on importance of early pumping but patient declined each time she was asked. Patient requested stool softeners this morning, educated provided that it is best to have something in your stomach prior to senna but patient insisted. Refused tylenol.  Response: Positive attachment behaviors observed with infant prior to NICU transfer. Support person present.   Plan: Continue with plan of care.

## 2021-06-02 ENCOUNTER — APPOINTMENT (OUTPATIENT)
Dept: CT IMAGING | Facility: CLINIC | Age: 31
End: 2021-06-02
Attending: OBSTETRICS & GYNECOLOGY
Payer: COMMERCIAL

## 2021-06-02 LAB
ALBUMIN SERPL-MCNC: 2.4 G/DL (ref 3.4–5)
ALP SERPL-CCNC: 108 U/L (ref 40–150)
ALT SERPL W P-5'-P-CCNC: 16 U/L (ref 0–50)
AMYLASE SERPL-CCNC: 60 U/L (ref 30–110)
ANION GAP SERPL CALCULATED.3IONS-SCNC: 6 MMOL/L (ref 3–14)
AST SERPL W P-5'-P-CCNC: 17 U/L (ref 0–45)
BILIRUB DIRECT SERPL-MCNC: <0.1 MG/DL (ref 0–0.2)
BILIRUB SERPL-MCNC: 0.1 MG/DL (ref 0.2–1.3)
BUN SERPL-MCNC: 8 MG/DL (ref 7–30)
CALCIUM SERPL-MCNC: 9 MG/DL (ref 8.5–10.1)
CHLORIDE SERPL-SCNC: 105 MMOL/L (ref 94–109)
CO2 SERPL-SCNC: 28 MMOL/L (ref 20–32)
CREAT SERPL-MCNC: 0.66 MG/DL (ref 0.52–1.04)
ERYTHROCYTE [DISTWIDTH] IN BLOOD BY AUTOMATED COUNT: 15.6 % (ref 10–15)
GFR SERPL CREATININE-BSD FRML MDRD: >90 ML/MIN/{1.73_M2}
GLUCOSE SERPL-MCNC: 71 MG/DL (ref 70–99)
HCT VFR BLD AUTO: 31.6 % (ref 35–47)
HGB BLD-MCNC: 10.5 G/DL (ref 11.7–15.7)
LIPASE SERPL-CCNC: 118 U/L (ref 73–393)
MCH RBC QN AUTO: 29.3 PG (ref 26.5–33)
MCHC RBC AUTO-ENTMCNC: 33.2 G/DL (ref 31.5–36.5)
MCV RBC AUTO: 88 FL (ref 78–100)
PLATELET # BLD AUTO: 433 10E9/L (ref 150–450)
POTASSIUM SERPL-SCNC: 4.2 MMOL/L (ref 3.4–5.3)
PROT SERPL-MCNC: 6.3 G/DL (ref 6.8–8.8)
RBC # BLD AUTO: 3.58 10E12/L (ref 3.8–5.2)
SODIUM SERPL-SCNC: 139 MMOL/L (ref 133–144)
WBC # BLD AUTO: 15.4 10E9/L (ref 4–11)

## 2021-06-02 PROCEDURE — 250N000013 HC RX MED GY IP 250 OP 250 PS 637: Performed by: OBSTETRICS & GYNECOLOGY

## 2021-06-02 PROCEDURE — 36415 COLL VENOUS BLD VENIPUNCTURE: CPT | Performed by: OBSTETRICS & GYNECOLOGY

## 2021-06-02 PROCEDURE — 250N000009 HC RX 250: Performed by: OBSTETRICS & GYNECOLOGY

## 2021-06-02 PROCEDURE — 250N000013 HC RX MED GY IP 250 OP 250 PS 637: Performed by: STUDENT IN AN ORGANIZED HEALTH CARE EDUCATION/TRAINING PROGRAM

## 2021-06-02 PROCEDURE — 80053 COMPREHEN METABOLIC PANEL: CPT | Performed by: OBSTETRICS & GYNECOLOGY

## 2021-06-02 PROCEDURE — 74177 CT ABD & PELVIS W/CONTRAST: CPT | Mod: 26 | Performed by: RADIOLOGY

## 2021-06-02 PROCEDURE — 999N000127 HC STATISTIC PERIPHERAL IV START W US GUIDANCE

## 2021-06-02 PROCEDURE — 74177 CT ABD & PELVIS W/CONTRAST: CPT

## 2021-06-02 PROCEDURE — 120N000002 HC R&B MED SURG/OB UMMC

## 2021-06-02 PROCEDURE — 250N000011 HC RX IP 250 OP 636: Performed by: OBSTETRICS & GYNECOLOGY

## 2021-06-02 PROCEDURE — 82248 BILIRUBIN DIRECT: CPT | Performed by: OBSTETRICS & GYNECOLOGY

## 2021-06-02 PROCEDURE — 85027 COMPLETE CBC AUTOMATED: CPT | Performed by: OBSTETRICS & GYNECOLOGY

## 2021-06-02 PROCEDURE — 82150 ASSAY OF AMYLASE: CPT | Performed by: OBSTETRICS & GYNECOLOGY

## 2021-06-02 PROCEDURE — 83690 ASSAY OF LIPASE: CPT | Performed by: OBSTETRICS & GYNECOLOGY

## 2021-06-02 RX ORDER — SIMETHICONE 80 MG
80 TABLET,CHEWABLE ORAL 4 TIMES DAILY
Status: DISCONTINUED | OUTPATIENT
Start: 2021-06-02 | End: 2021-06-03 | Stop reason: HOSPADM

## 2021-06-02 RX ORDER — OXYCODONE HYDROCHLORIDE 5 MG/1
5 TABLET ORAL EVERY 4 HOURS PRN
Status: DISCONTINUED | OUTPATIENT
Start: 2021-06-02 | End: 2021-06-03 | Stop reason: HOSPADM

## 2021-06-02 RX ORDER — MEDROXYPROGESTERONE ACETATE 150 MG/ML
150 INJECTION, SUSPENSION INTRAMUSCULAR ONCE
Status: COMPLETED | OUTPATIENT
Start: 2021-06-02 | End: 2021-06-03

## 2021-06-02 RX ORDER — IOPAMIDOL 755 MG/ML
100 INJECTION, SOLUTION INTRAVASCULAR ONCE
Status: COMPLETED | OUTPATIENT
Start: 2021-06-02 | End: 2021-06-02

## 2021-06-02 RX ADMIN — ACETAMINOPHEN 650 MG: 325 TABLET, FILM COATED ORAL at 05:04

## 2021-06-02 RX ADMIN — IOPAMIDOL 135 ML: 755 INJECTION, SOLUTION INTRAVENOUS at 10:47

## 2021-06-02 RX ADMIN — POLYETHYLENE GLYCOL 3350 17 G: 17 POWDER, FOR SOLUTION ORAL at 10:56

## 2021-06-02 RX ADMIN — ACETAMINOPHEN 650 MG: 325 TABLET, FILM COATED ORAL at 23:30

## 2021-06-02 RX ADMIN — OXYCODONE HYDROCHLORIDE 5 MG: 5 TABLET ORAL at 08:34

## 2021-06-02 RX ADMIN — DOCUSATE SODIUM AND SENNOSIDES 2 TABLET: 8.6; 5 TABLET ORAL at 21:58

## 2021-06-02 RX ADMIN — ENOXAPARIN SODIUM 40 MG: 100 INJECTION SUBCUTANEOUS at 21:58

## 2021-06-02 RX ADMIN — HYDROXYZINE HYDROCHLORIDE 50 MG: 25 TABLET, FILM COATED ORAL at 05:04

## 2021-06-02 RX ADMIN — SIMETHICONE CHEW TAB 80 MG 80 MG: 80 TABLET ORAL at 08:08

## 2021-06-02 RX ADMIN — OXYCODONE HYDROCHLORIDE 5 MG: 5 TABLET ORAL at 17:41

## 2021-06-02 RX ADMIN — CYCLOBENZAPRINE 10 MG: 5 TABLET, FILM COATED ORAL at 17:41

## 2021-06-02 RX ADMIN — ACETAMINOPHEN 650 MG: 325 TABLET, FILM COATED ORAL at 12:37

## 2021-06-02 RX ADMIN — ACETAMINOPHEN 650 MG: 325 TABLET, FILM COATED ORAL at 17:42

## 2021-06-02 RX ADMIN — OMEPRAZOLE 40 MG: 20 CAPSULE, DELAYED RELEASE ORAL at 08:08

## 2021-06-02 RX ADMIN — DOCUSATE SODIUM AND SENNOSIDES 2 TABLET: 8.6; 5 TABLET ORAL at 12:36

## 2021-06-02 RX ADMIN — ENOXAPARIN SODIUM 40 MG: 100 INJECTION SUBCUTANEOUS at 10:00

## 2021-06-02 RX ADMIN — SIMETHICONE CHEW TAB 80 MG 80 MG: 80 TABLET ORAL at 17:42

## 2021-06-02 RX ADMIN — CYCLOBENZAPRINE 10 MG: 5 TABLET, FILM COATED ORAL at 08:04

## 2021-06-02 RX ADMIN — ACETAMINOPHEN 650 MG: 325 TABLET, FILM COATED ORAL at 08:33

## 2021-06-02 RX ADMIN — SODIUM CHLORIDE 74 ML: 9 INJECTION, SOLUTION INTRAVENOUS at 10:48

## 2021-06-02 NOTE — PROVIDER NOTIFICATION
06/02/21 0845   Provider Notification   Provider Name/Title Dr Wren   Method of Notification In Department   Patient experiencing intermittent sharp pain around her belly button rom L to R sides of the abdomen. When the pain comes on it is very sharp and the patient has almost vomited as well as moving restlessly in the bed in pain. The sharp pain will pass in a couple of minutes or less, and then return just as sharp. She has described it as feeling sometimes like gas pain. She is passing gas, and had two tiny bowel movements yesterday. Otherwise has not had a bowel movement in a week since last Thursday.    She also describes the pain as feeling like contractions in terms of  how the pain comes and goes. All of this discussed with Dr Wren. Oxicodone and tylenol given to patient. Patient has not slept much due to the intermittent ongoing sharp pain. Would like to try some miralax in an hour.

## 2021-06-02 NOTE — LACTATION NOTE
Spoke with bedside RN; mom in a lot of pain, not a good day to meet.  Will see patient when appropriate.    Will continue to provide lactation support.    Xin Shankar, RNC, IBCLC

## 2021-06-02 NOTE — PROGRESS NOTES
Postpartum Progress Note  DOS: 21  Morgan Mcbride  [id number]    Subjective:   Patient feeling crampy and gas pain. Ambulating without dizziness. Eating and drinking without nausea or vomiting. Lochia less than a typical period. Voiding spontaneously.  Breastfeeding. Had multiple BMs yesterday. Thinking about nexplanon at 6 week visit, depo PTD.    No headache, vision changes, CP, SOB    Objective:  /64 (BP Location: Left arm)   Pulse 92   Temp 98.4  F (36.9  C) (Oral)   Resp 18   Ht 1.524 m (5')   Wt 140.2 kg (309 lb 1.4 oz)   SpO2 100%   Breastfeeding Unknown   BMI 60.36 kg/m       Vitals:    21 0850 21 1735 21 2229 21 0510   BP: 116/68 136/64 122/62 113/80   BP Location:  Left arm     Pulse: 83 92 78 105   Resp: 18 18 18 20   Temp: 98.4  F (36.9  C) 98.4  F (36.9  C) 98.2  F (36.8  C) 98  F (36.7  C)   TempSrc: Oral Oral Oral Oral   SpO2:  100%     Weight:       Height:           General: NAD, comfortable  Heart: Regular rate  Lungs: Breathing comfortably, on room air  Abdomen: deferred    Assessment/Plan: 30 year old  who is PPD#2 s/p . Pregnancy was notable for choledocholithiasis, class III obesity, chronic HTN, depression. Currently stable and doing well.     Postpartum  - Continue routine post-partum cares.     - Heme: Appropriate blood loss during delivery. AM hgb 9.8  - Pain: Continue prn tylenol  - GI: scheduled senna, prn antiemetics. Adding scheduled simethicone and miralax due to desire for BM. Discussed her asking for enema at noon if not delivered yet  - : Voiding spontaneously   - Baby:  breastfeeding  - Contraception: Nexplanon at 6 week visit, depot PTD  - Rh pos, Rubella immune  - PPx: Encourage ambulation, lovenox     Choledocholithiasis  - LFTs nl, pain well-controlled overall, plan for her primary gen surgeon to do lap floyd in 1-2 weeks      CHTN- no meds  - serial BP monitoring  - HELLP labs wnl, UPC 0.21    H/o Leticia en Y: no  ibuprofen ordered    Depression-no meds  SW consulted    Dispo: Discharge to home today    Jessika Lepe MD  Obstetrics and Gynecology PGY-2  6/2/21    [facility] staff:  Appreciate note by Dr. Lepe.  I have seen and examined the patient without the resident. At the time of my exam, pt experiencing severe abdominal pain. Described as 10/10, radiating from umbilicus around entire abdomen.  Pain causes waves of nausea/salivating.  Has passed gas, but minimal stool.  Was able to eat within past 12 hours w/o vomiting.     Discussed symptoms with Gen Surg Bariatric team, Dr. Walter.  Recommended MRCP eval d/t recent imaging with enlarged bile duct and o/w normal CT with post gastric bypass changes.      Labs and imaging ordered. Pt NPO now.    I have reviewed, edited, and agree with the note.      Ly Wren MD, FACOG  6/2/2021  9:36 AM

## 2021-06-02 NOTE — CONSULTS
This consult is copied from baby's chart and listed in mom's chart under Progress Note    Oneida Gustafson  DSW, MSW, LICSW  Maternal Child Health

## 2021-06-02 NOTE — PROGRESS NOTES
This writer spoke with bedside nurse.  Ne-Ne is experiencing significant pain.  Medical staff is working to help patient manage pain.  Patient not available for NICU assessment.    Oneida JOHNSONW, MSW, [facility] Hospital[facility]  Maternal Child Health

## 2021-06-02 NOTE — LACTATION NOTE
"This note was copied from a baby's chart.  I met with mom briefly at bedside; Ann was finishing a bath and preparing to hopefully return to [facility] later today.  Ne Ne stated she hasn't been pumping much, \"it hurts my breasts and I get horrible cramps with pumping, hand expression and latching\".  She did not nurse her 1st child.  I helped her get Ann skin to skin; she briefly nuzzled at breast then fell asleep.  Encouraged latching/ hand expression/ pumping to initiate supply and reassured mom that she would have assistance with latching as needed.  No folder given at this time; will explore tomorrow if still on NICU.  "

## 2021-06-02 NOTE — PLAN OF CARE
Data: Vital signs within normal limits. Postpartum checks within normal limits - see flow record. Patient eating and drinking normally. Patient able to empty bladder independently and is up ambulating. No apparent signs of infection.  Laceration  healing well. Utilizing aly bottle for comfort. Patient performing self cares. Patient did not pump overnight.  Action: Patient medicated during the shift for pain with Tylenol, Atarax, and Flexeril. See MAR. Patient reassessed within 1 hour after each medication and pain was improved after medication. Complains of severe stomach pains with constant cramping that radiates to the back and down the legs. Patient also complains of trapped gas pains. Simethicone given. Aqua K heating pad also initiated. Patient education done about pain management and activity. Encouraged to empty bladder frequently. Discussed typical amount of bleeding and when to notify. See flow record.  Response: Positive attachment behaviors observed with infant. Visit to NICU x2 overnight. Support persons present.   Plan: Anticipate discharge on 6/2/21. Continue POC.

## 2021-06-02 NOTE — PLAN OF CARE
"Patient went to NICU from 2 to 5pm. Reports pain as a constant 7 and then intermittently gets the very sharp pain that she describes as feeling like contractions. Discussed trying a suppository again with the patient. She declined at this time but said she would try overnight if she had not had a bowel movement. Althea said the \"pain meds don't do much but make her feel sleepy.\"   Has a support person at her bedside at this time.  "

## 2021-06-03 VITALS
WEIGHT: 293 LBS | TEMPERATURE: 98.3 F | DIASTOLIC BLOOD PRESSURE: 65 MMHG | HEART RATE: 67 BPM | HEIGHT: 60 IN | OXYGEN SATURATION: 100 % | SYSTOLIC BLOOD PRESSURE: 117 MMHG | BODY MASS INDEX: 57.52 KG/M2 | RESPIRATION RATE: 16 BRPM

## 2021-06-03 LAB — PLATELET # BLD AUTO: 441 10E9/L (ref 150–450)

## 2021-06-03 PROCEDURE — 36416 COLLJ CAPILLARY BLOOD SPEC: CPT | Performed by: OBSTETRICS & GYNECOLOGY

## 2021-06-03 PROCEDURE — 250N000013 HC RX MED GY IP 250 OP 250 PS 637: Performed by: OBSTETRICS & GYNECOLOGY

## 2021-06-03 PROCEDURE — 85049 AUTOMATED PLATELET COUNT: CPT | Performed by: OBSTETRICS & GYNECOLOGY

## 2021-06-03 PROCEDURE — 250N000013 HC RX MED GY IP 250 OP 250 PS 637: Performed by: STUDENT IN AN ORGANIZED HEALTH CARE EDUCATION/TRAINING PROGRAM

## 2021-06-03 PROCEDURE — 250N000011 HC RX IP 250 OP 636: Performed by: STUDENT IN AN ORGANIZED HEALTH CARE EDUCATION/TRAINING PROGRAM

## 2021-06-03 PROCEDURE — 250N000011 HC RX IP 250 OP 636: Performed by: OBSTETRICS & GYNECOLOGY

## 2021-06-03 RX ORDER — SENNA AND DOCUSATE SODIUM 50; 8.6 MG/1; MG/1
1 TABLET, FILM COATED ORAL AT BEDTIME
Qty: 30 TABLET | Refills: 0 | Status: SHIPPED | OUTPATIENT
Start: 2021-06-03 | End: 2021-07-22

## 2021-06-03 RX ORDER — SIMETHICONE 80 MG
80 TABLET,CHEWABLE ORAL 4 TIMES DAILY
Qty: 40 TABLET | Refills: 1 | Status: SHIPPED | OUTPATIENT
Start: 2021-06-03 | End: 2021-07-22

## 2021-06-03 RX ORDER — CYCLOBENZAPRINE HCL 10 MG
10 TABLET ORAL 3 TIMES DAILY PRN
Qty: 15 TABLET | Refills: 0 | Status: SHIPPED | OUTPATIENT
Start: 2021-06-03 | End: 2021-07-22

## 2021-06-03 RX ORDER — OMEPRAZOLE 20 MG/1
20 TABLET, DELAYED RELEASE ORAL DAILY
Qty: 30 TABLET | Refills: 0 | Status: SHIPPED | OUTPATIENT
Start: 2021-06-03 | End: 2021-07-22

## 2021-06-03 RX ORDER — OXYCODONE HYDROCHLORIDE 5 MG/1
5-10 CAPSULE ORAL EVERY 4 HOURS PRN
Qty: 6 CAPSULE | Refills: 0 | Status: SHIPPED | OUTPATIENT
Start: 2021-06-03 | End: 2021-07-22

## 2021-06-03 RX ORDER — ACETAMINOPHEN 325 MG/1
650 TABLET ORAL EVERY 4 HOURS PRN
Qty: 45 TABLET | Refills: 0 | Status: SHIPPED | OUTPATIENT
Start: 2021-06-03 | End: 2021-07-19

## 2021-06-03 RX ADMIN — ENOXAPARIN SODIUM 40 MG: 100 INJECTION SUBCUTANEOUS at 09:02

## 2021-06-03 RX ADMIN — SIMETHICONE CHEW TAB 80 MG 80 MG: 80 TABLET ORAL at 01:54

## 2021-06-03 RX ADMIN — DOCUSATE SODIUM AND SENNOSIDES 2 TABLET: 8.6; 5 TABLET ORAL at 09:02

## 2021-06-03 RX ADMIN — ACETAMINOPHEN 650 MG: 325 TABLET, FILM COATED ORAL at 05:36

## 2021-06-03 RX ADMIN — OXYCODONE HYDROCHLORIDE 5 MG: 5 TABLET ORAL at 11:20

## 2021-06-03 RX ADMIN — MEDROXYPROGESTERONE ACETATE 150 MG: 150 INJECTION, SUSPENSION INTRAMUSCULAR at 06:26

## 2021-06-03 RX ADMIN — OXYCODONE HYDROCHLORIDE 5 MG: 5 TABLET ORAL at 05:36

## 2021-06-03 RX ADMIN — OMEPRAZOLE 40 MG: 20 CAPSULE, DELAYED RELEASE ORAL at 09:03

## 2021-06-03 RX ADMIN — ACETAMINOPHEN 650 MG: 325 TABLET, FILM COATED ORAL at 11:20

## 2021-06-03 RX ADMIN — CYCLOBENZAPRINE 10 MG: 5 TABLET, FILM COATED ORAL at 09:03

## 2021-06-03 RX ADMIN — SIMETHICONE CHEW TAB 80 MG 80 MG: 80 TABLET ORAL at 09:03

## 2021-06-03 NOTE — PROVIDER NOTIFICATION
06/03/21 0536   Provider Notification   Provider Name/Title G3   Method of Notification In Department   Request Evaluate-Remote   Notification Reason Other   Pt EDS 15. 0 to question 10. SW consult placed. Thanks!

## 2021-06-03 NOTE — PLAN OF CARE
"Pt VSS and postpartum assessment WDL. Pt is up ad toshia, voiding without difficulty and passing gas. Pt was NPO overnight for MRI this morning. Pt had a \"golf ball sized poop\" overnight. Pt stated she has not had \"a good poop in a week\". Writer encouraged a suppository to help with constipation pt declined. Perineum appears to be healing well, no s/s of infection. Pt did not pump overnight. Pt took Tylenol and Oxycodone overnight for pain, reports on and off pain, at times uncontrollable. Writer offered pharmacological and nonpharmacologic pain management multiple times overnight, pt declined. Stated \"I am already tired I don't want to be more tired taking medications.\" Pt went to NICU to visit infant overnight. Pt EDS 15-provider notified and SW consult placed. Depo birthcontrol shot administered this morning. Support person present at the bedside. Continue with plan of care.   "

## 2021-06-03 NOTE — CONSULTS
Pyschosocial assessment completed (see previous note)  Ho acknowledged feeling overwhelmed and occasionally depressed due to her pain and need for gall bladder surgery.  She denies suicidal ideation and history of MH concerns.  Her partner is taking time off of work to assist in her recovery.  Kenna is aware of PMAD and is willing to access resources if needed.  She reports her symptoms are situational and not indicative of a mood disorder.    Oneida JOHNSONW, MSW, [facility]  Maternal Child Health

## 2021-06-03 NOTE — ADDENDUM NOTE
Addendum  created 06/02/21 2146 by Brenna Melendez MD    Clinical Note Signed, Intraprocedure Blocks edited

## 2021-06-03 NOTE — PROGRESS NOTES
Postpartum Progress Note  DOS: 6/3/21  Morgan Mcbride  [id number]    Subjective:   Patient feeling improved this morning. Was able to sleep for 4 hours overnight. Her abdominal pain now feels more like uterine cramping, no longer having RUQ pain. Ambulating without dizziness. Eating and drinking without nausea or vomiting. Lochia less than a typical period. Voiding spontaneously.  Breastfeeding. Has had multiple BMs. Thinking about nexplanon at 6 week visit, depo PTD.    No headache, vision changes, CP, SOB    Objective:  /76   Pulse 84   Temp 97  F (36.1  C) (Axillary)   Resp 18   Ht 1.524 m (5')   Wt 140.2 kg (309 lb 1.4 oz)   SpO2 100%   Breastfeeding Unknown   BMI 60.36 kg/m       Vitals:    21 2229 21 0510 21 1000 21 1744   BP: 122/62 113/80 126/68 120/76   BP Location:       Pulse: 78 105 88 84   Resp: 18 20 18 18   Temp: 98.2  F (36.8  C) 98  F (36.7  C) 98.2  F (36.8  C) 97  F (36.1  C)   TempSrc: Oral Oral Oral Axillary   SpO2:       Weight:       Height:           General: NAD, comfortable  Heart: Regular rate  Lungs: Breathing comfortably, on room air  Abdomen: Obese, soft, nontender. Fundus unable to be palpated.     Assessment/Plan: 30 year old  who is PPD#3 s/p . Pregnancy was notable for choledocholithiasis, class III obesity, chronic HTN, depression. Continued inpatient stay due to choledocholithiasis pain, plan for MRCP today.    Postpartum  - Continue routine post-partum cares.     - Heme: Appropriate blood loss during delivery. Postpartum hgb 9.8  - Pain: Continue prn tylenol  - GI: scheduled senna and simethicone, prn antiemetics.   - : Voiding spontaneously   - Baby:  breastfeeding  - Contraception: Nexplanon at 6 week visit, depot PTD  - Rh pos, Rubella immune  - PPx: Encourage ambulation, lovenox     Choledocholithiasis  - LFTs nl, pain well-controlled overall, plan for her primary gen surgeon consult to discuss lap floyd in 1-2  weeks  - CT yesterday with no acute process   - NPO for MRCP today    CHTN- no meds  - serial BP monitoring  - HELLP labs wnl, UPC 0.21    H/o Leticia en Y: no ibuprofen ordered    Depression-no meds  SW consulted    Dispo: Discharge to home today after MRCP    Neema Martinez MD  Obstetrics and Gynecology, PGY-3  6/3/2021 8:03 AM    Appreciate note by Dr. Martinez. Patient has been seen and examined by me separate from the resident, agree with above note. Pain improved today, ready for discharge following MRCP.      María Hunter MD  10:52 AM

## 2021-06-03 NOTE — PLAN OF CARE
VSS and postpartum assessments WNL. Pain adequately managed with tylenol, flexeril, and oxycodone Bonding well with  in NICU. Expresses readiness for transition to home. Follow-up instructions reviewed, discharge education completed and questions answered, patient verbalizes understanding. Home medications given and instructions reviewed. Discharged to home.

## 2021-06-17 ENCOUNTER — HOSPITAL ENCOUNTER (OUTPATIENT)
Dept: MRI IMAGING | Facility: CLINIC | Age: 31
Discharge: HOME | End: 2021-06-17
Attending: OBSTETRICS & GYNECOLOGY | Admitting: OBSTETRICS & GYNECOLOGY
Payer: COMMERCIAL

## 2021-06-17 PROCEDURE — 255N000002 HC RX 255 OP 636: Performed by: OBSTETRICS & GYNECOLOGY

## 2021-06-17 PROCEDURE — A9585 GADOBUTROL INJECTION: HCPCS | Performed by: OBSTETRICS & GYNECOLOGY

## 2021-06-17 PROCEDURE — 258N000003 HC RX IP 258 OP 636: Performed by: OBSTETRICS & GYNECOLOGY

## 2021-06-17 PROCEDURE — 74183 MRI ABD W/O CNTR FLWD CNTR: CPT | Mod: 26 | Performed by: RADIOLOGY

## 2021-06-17 PROCEDURE — 74183 MRI ABD W/O CNTR FLWD CNTR: CPT

## 2021-06-17 PROCEDURE — 250N000009 HC RX 250: Performed by: OBSTETRICS & GYNECOLOGY

## 2021-06-17 PROCEDURE — 250N000011 HC RX IP 250 OP 636: Performed by: OBSTETRICS & GYNECOLOGY

## 2021-06-17 RX ORDER — GADOBUTROL 604.72 MG/ML
15 INJECTION INTRAVENOUS ONCE
Status: COMPLETED | OUTPATIENT
Start: 2021-06-17 | End: 2021-06-17

## 2021-06-17 RX ADMIN — GADOBUTROL 14 ML: 604.72 INJECTION INTRAVENOUS at 12:22

## 2021-06-17 RX ADMIN — SODIUM CHLORIDE 55 ML: 9 INJECTION, SOLUTION INTRAVENOUS at 12:22

## 2021-06-17 RX ADMIN — HUMAN SECRETIN 15.8 MCG: 16 INJECTION, POWDER, LYOPHILIZED, FOR SOLUTION INTRAVENOUS at 13:06

## 2021-06-17 RX ADMIN — HUMAN SECRETIN 0.2 MCG: 16 INJECTION, POWDER, LYOPHILIZED, FOR SOLUTION INTRAVENOUS at 12:54

## 2021-06-18 ENCOUNTER — TELEPHONE (OUTPATIENT)
Dept: OBGYN | Facility: CLINIC | Age: 31
End: 2021-06-18

## 2021-06-18 NOTE — TELEPHONE ENCOUNTER
Pt called regarding results from [facility].  Results were read to pt.  Pt [facility] that Dr Wren [facility].  Message sent to Dr Wren.

## 2021-06-23 ENCOUNTER — TELEPHONE (OUTPATIENT)
Dept: OBGYN | Facility: CLINIC | Age: 31
End: 2021-06-23

## 2021-06-23 NOTE — TELEPHONE ENCOUNTER
----- Message from Selena Haines RN sent at 6/22/2021  2:48 PM CDT -----  Regarding: wants to talk to nurse

## 2021-06-28 ENCOUNTER — TELEPHONE (OUTPATIENT)
Dept: SURGERY | Facility: CLINIC | Age: 31
End: 2021-06-28

## 2021-06-28 NOTE — TELEPHONE ENCOUNTER
Patient called to schedule an appointment to see Dr. Walter to discuss possible cholecystectomy. Recently hospitalized with delivery. Patient states she had a Leticia-en-Y gastric bypass with Dr. Walter. Scheduled in-person clinic visit on 7/8 at 2 p.m.

## 2021-06-28 NOTE — TELEPHONE ENCOUNTER
Patient advised.    Ly Wren MD Sanders, Cally D, RN 3 days ago     The doc she was to see was DR. Too Walter at [facility].  [phone]

## 2021-06-28 NOTE — TELEPHONE ENCOUNTER
REFERRAL INFORMATION:    Referring Provider:  N/A    Referring Clinic:  N/A    Reason for Visit/Diagnosis: Discuss Angelita.        FUTURE VISIT INFORMATION:    Appointment Date: 7/8/2021    Appointment Time: 2 PM      NOTES RECORD STATUS  DETAILS   OFFICE NOTE from Referring Provider N/A    OFFICE NOTE from Other Specialists N/A    HOSPITAL DISCHARGE SUMMARY/ ED VISITS  Care Everywhere/ Internal  5/30/2021 ([facility])   5/26/2021 ([facility])    OPERATIVE REPORT N/A    ENDOSCOPY (EGD)  N/A    PERTINENT LABS Internal/ Care Everywhere 12/31/14, 7/30/12 4/4/19 ([facility])    PATHOLOGY REPORTS (RELATED) Internal    IMAGING (CT, MRI, US, XR)  Internal/ Care Everywhere MR Abdomen: 6/17/2021  CT Abdomen Pelvis: 6/2/2021, 5/30/2021, 7/3012  XR Upper GI: 12/23/14, 5/19/12  US Abdomen: 7/30/12    [facility]:  - US Abdomen: 5/26/2021 6/28/2021 2:08pm Fax request sent to [facility] for image. -Elizabeth

## 2021-07-08 ENCOUNTER — PRE VISIT (OUTPATIENT)
Dept: SURGERY | Facility: CLINIC | Age: 31
End: 2021-07-08

## 2021-07-08 ENCOUNTER — OFFICE VISIT (OUTPATIENT)
Dept: SURGERY | Facility: CLINIC | Age: 31
End: 2021-07-08
Payer: COMMERCIAL

## 2021-07-08 ENCOUNTER — PREP FOR PROCEDURE (OUTPATIENT)
Dept: SURGERY | Facility: CLINIC | Age: 31
End: 2021-07-08

## 2021-07-08 VITALS
TEMPERATURE: 99.1 F | SYSTOLIC BLOOD PRESSURE: 142 MMHG | WEIGHT: 293 LBS | DIASTOLIC BLOOD PRESSURE: 86 MMHG | HEART RATE: 67 BPM | HEIGHT: 61 IN | BODY MASS INDEX: 55.32 KG/M2 | OXYGEN SATURATION: 100 %

## 2021-07-08 DIAGNOSIS — K80.50 CHOLEDOCHOLITHIASIS: Primary | ICD-10-CM

## 2021-07-08 DIAGNOSIS — K80.51 CALCULUS OF BILE DUCT WITHOUT CHOLECYSTITIS WITH OBSTRUCTION: Primary | ICD-10-CM

## 2021-07-08 PROCEDURE — 99214 OFFICE O/P EST MOD 30 MIN: CPT | Performed by: SURGERY

## 2021-07-08 RX ORDER — CEFAZOLIN SODIUM IN 0.9 % NACL 3 G/100 ML
3 INTRAVENOUS SOLUTION, PIGGYBACK (ML) INTRAVENOUS
Status: CANCELLED | OUTPATIENT
Start: 2021-07-08

## 2021-07-08 RX ORDER — CEFAZOLIN SODIUM 1 G/50ML
1 INJECTION, SOLUTION INTRAVENOUS SEE ADMIN INSTRUCTIONS
Status: CANCELLED | OUTPATIENT
Start: 2021-07-08

## 2021-07-08 ASSESSMENT — PATIENT HEALTH QUESTIONNAIRE - PHQ9: SUM OF ALL RESPONSES TO PHQ QUESTIONS 1-9: 10

## 2021-07-08 ASSESSMENT — MIFFLIN-ST. JEOR: SCORE: 2061.71

## 2021-07-08 ASSESSMENT — PAIN SCALES - GENERAL: PAINLEVEL: NO PAIN (0)

## 2021-07-08 NOTE — PROGRESS NOTES
Return Bariatric Surgery Note    RE: Morgan Mcbride  MR#: [id number]  : 1990  VISIT DATE: 2021    Dear Antoni Bonilla,    I had the pleasure of seeing your patient, Morgan Mcbride, in my post-bariatric surgery assessment clinic.    CHIEF COMPLAINT: Post-bariatric surgery follow-up    HISTORY OF PRESENT ILLNESS:  No flowsheet data found.    Weight History:  No flowsheet data found.     Weight: 140.9 kg (310 lb 11.2 oz)             No flowsheet data found.    Eating Habits 2020   How many meals do you eat per day? 2   Do you snack between meals? No       Exercise Questions Reviewed With Patient 2020   How often do you exercise? 3 to 4 times per week   What is the duration of your exercise (in minutes)? 60+ Minutes   What types of exercise do you do? walking, gym membership   What keeps you from being more active?  I am as active as I can possbily be       Social History:  No flowsheet data found.    Medications:  Current Outpatient Medications   Medication     acetaminophen (TYLENOL) 325 MG tablet     buPROPion (WELLBUTRIN SR) 100 MG 12 hr tablet     cyclobenzaprine (FLEXERIL) 10 MG tablet     hydrOXYzine (ATARAX) 25 MG tablet     lisinopril (ZESTRIL) 5 MG tablet     Magnesium Hydroxide (DULCOLAX PO)     medroxyPROGESTERone (DEPO-PROVERA) 150 MG/ML syringe     Melatonin 5 MG CHEW     naltrexone (DEPADE/REVIA) 50 MG tablet     omeprazole (PRILOSEC OTC) 20 MG EC tablet     omeprazole (PRILOSEC) 40 MG DR capsule     oxyCODONE (OXY-IR) 5 MG capsule     polyethylene glycol (MIRALAX) 17 GM/Dose powder     SENNA-docusate sodium (SENNA S) 8.6-50 MG tablet     simethicone (MYLICON) 80 MG chewable tablet     No current facility-administered medications for this visit.      No flowsheet data found.    ROS:  GI: No flowsheet data found.  Skin: No flowsheet data found.  Psych: No flowsheet data found.  Female Only:   BAR RBS ROS - FEMALE ONLY 2020   Female only: None of the above        LABS/IMAGING/MEDICAL RECORDS REVIEW:     Endoscopy:  Findings:       The examined esophagus was normal with gastroesophageal junction at 36cm.       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 Gastrojejunal anastomosis is        healthy-appearing without stricture or ulceration.       The examined jejunum was normal. Biopsies were taken with a cold forceps        for histology. Estimated blood loss was minimal.    Impressions/Post-Op Diagnosis:       - Normal esophagus.       - Leticia-en-Y gastrojejunostomy.       - Normal examined jejunum. Biopsied.    Recommendation:       - Await pathology results.        ____________________  April JOHANA Khan MD  4/4/2019 12:21:29 PM  This report has been signed electronically      Study Result    MRCP Without and With Contrast     CLINICAL HISTORY: evaluate enlarged common bile duct, enlarged bile  duct, h/o gastric bypass, increasing abdominal pain     DATE: 6/17/2021 1:24 PM     TECHNIQUE:      Images were acquired with and without intravenous gadolinium contrast  through the upper abdomen. The following MR images were acquired  without intravenous contrast: TrueFISP, multiplanar T2-weighted, axial  T1 in/out of phase, T2-weighted MRCP images, axial diffusion-weighted  and axial apparent diffusion coefficient. T1-weighted images were  obtained before contrast at the multiple time points following  contrast injection. 3-D reformatted images were generated by the  technologist. Secretin was administered and additional MRCP images  were obtained. Contrast dose: 14ml iv Gadavist     Comparison study: CT 6/2/2021     FINDINGS:     Biliary Tree: No intrahepatic or extrahepatic biliary dilatation. No  definite obstructing stone or mass.     Pancreas: Normal appearance and signal characteristics of the  pancreatic parenchyma. A 6 mm focus of a focal fat is noted in the  pancreas. No focal enhancing masses.  Pancreatic duct normal in  caliber. No side branch ductal dilatation. Pancreas divisum. Normal  response to secretin with contrast extending to the jejunum at 10  minutes.     Liver: The liver surface is smooth. No focal suspicious masses  identified. No significant hepatic steatosis or iron deposition.     Gallbladder: Distended without wall thickening. No pericholecystic  inflammatory changes.        Spleen: Normal, not enlarged.     Kidneys: No kidney stone, cysts or masses. No pelvocaliectasis.      Adrenal glands: Normal.      Bowel: Unremarkable, with no evidence of bowel dilatation or abnormal  wall enhancement.        Lymph nodes: No abdominal lymphadenopathy by size criteria.     Blood vessels: Major blood vessels are patent with no evidence of clot  or obstruction.         Lung bases: No abnormal T2 hyperintensity in the lung bases.     Bones and soft tissues: No evidence of acute bony abnormality or  abnormal soft tissue enhancement.        Mesentery and abdominal wall: Unremarkable.     Ascites: No free fluid is detected.                                                                        IMPRESSION:  1. No intrahepatic or extrahepatic biliary dilatation. No definite  obstructing stone or mass.  2. Gallbladder is distended without definite evidence of acute  cholecystitis.  3. Pancreas divisum.     I have personally reviewed the examination and initial interpretation  and I agree with the findings.     HARPREET BRITTON MD       US ABDOMEN UPPER5/26/2021  [facility]   Result Impression   IMPRESSION:    Dilated common bile duct measuring 2.1 cm. There appears to be internal echogenic debris suggestive of sludge and/or small stones. No intrahepatic biliary duct dilatation. No gallstone identified. No sonographic evidence of cholecystitis. MRCP without contrast could be obtained for further evaluation if clinically warranted.    No hydronephrosis.      REPORT SIGNED BY DR. Eh Lopez   Result  Narrative   EXAM: ULTRASOUND ABDOMEN    DATE: 5/26/2021 9:00 PM    COMPARISON: None    CLINICAL DATA: Vomiting.    ADDITIONAL CLINICAL DATA:    FINDINGS:    Gallbladder: Normal. No stones, wall thickening, or pericholecystic fluid.    Bile Ducts: Common bile duct dilated measuring 2.1 cm. There appears to be internal echogenic debris.     Liver: Normal.    Spleen: Normal. 10.7 cm    Kidneys: Normal. No hydronephrosis. Right kidney 10.9 x 4.5 x 5.2 cm and left kidney 11.2 x 5.7 x 5.9 cm    Pancreas: Visualized head and body of pancreas unremarkable. Remainder the pancreas obscured by bowel gas    Aorta: Proximal aorta patent. Mid and distal aorta obscured by bowel gas    IVC: Patent.    Main portal vein: Patent.   Other Result Information   Interface, Nmhcradordrslt In - 05/26/2021  9:33 PM CDT  Formatting of this note might be different from the original.  EXAM: ULTRASOUND ABDOMEN    DATE: 5/26/2021 9:00 PM    COMPARISON: None    CLINICAL DATA: Vomiting.    ADDITIONAL CLINICAL DATA:    FINDINGS:    Gallbladder: Normal. No stones, wall thickening, or pericholecystic fluid.    Bile Ducts: Common bile duct dilated measuring 2.1 cm. There appears to be internal echogenic debris.     Liver: Normal.    Spleen: Normal. 10.7 cm    Kidneys: Normal. No hydronephrosis. Right kidney 10.9 x 4.5 x 5.2 cm and left kidney 11.2 x 5.7 x 5.9 cm    Pancreas: Visualized head and body of pancreas unremarkable. Remainder the pancreas obscured by bowel gas    Aorta: Proximal aorta patent. Mid and distal aorta obscured by bowel gas    IVC: Patent.    Main portal vein: Patent.      IMPRESSION  IMPRESSION:    Dilated common bile duct measuring 2.1 cm. There appears to be internal echogenic debris suggestive of sludge and/or small stones. No intrahepatic biliary duct dilatation. No gallstone identified. No sonographic evidence of cholecystitis. MRCP without contrast could be obtained for further evaluation if clinically warranted.    No  hydronephrosis.      REPORT SIGNED BY DR. Eh Lopez     6/2/21 10:52 AM [id number] [facility]    PACS Images     Show images for CT Abdomen Pelvis w Contrast   Study Result    EXAMINATION: CT ABDOMEN PELVIS W CONTRAST, 6/2/2021 10:52 AM     TECHNIQUE:  Helical CT images from the lung bases through the  symphysis pubis were obtained with IV contrast. Contrast dose: 135mL  of isovue 370     COMPARISON: 5/30/2021     HISTORY: Nausea/vomiting; Post partum x 3 days. worsening colicky  abdominal pain/nausea     FINDINGS:     Abdomen and pelvis: No focal hepatic lesion. The gallbladder, urinary  tree, pancreas, spleen, adrenal glands, and renal cortices are normal.  No hydronephrosis, hydroureter, or urinary tract stone. Normal  bladder. The uterus is enlarged with myometrial heterogeneity,  compatible with recently gravid state. No appreciable endometrial  abnormality. Nonenlarged ovaries. No free fluid or free air. No bowel  obstruction or inflammation. Moderate colonic stool burden. Normal  appendix. Surgical changes of Leticia-en-Y gastric bypass. The major  abdominal vasculature is patent. No abdominal or pelvic  lymphadenopathy.     Lung bases/lower chest:  Subsegmental atelectasis in the lung bases.  No pleural or pericardial effusion.     Bones and soft tissues: No acute or aggressive osseous abnormality.  Foci of air in the subcutaneous fat of the anterior abdominal wall,  likely related to subcutaneous injections.                                                                      IMPRESSION:   1. No acute intra-abdominal pathology.  2. Expected postpartum enlargement of the uterus.   3. No intra or extrahepatic biliary dilation in this patient with  history of choledocholithiasis (per clinical note). If there is  ongoing concern for biliary pathology, consider MRCP.     WILMA MARCANO, DO         PHYSICAL EXAMINATION:  BP (!) 142/86 (BP Location: Left arm, Patient Position: Sitting, Cuff Size:  "Adult Regular)   Pulse 67   Temp 99.1  F (37.3  C) (Oral)   Ht 1.549 m (5' 1\")   Wt 140.9 kg (310 lb 11.2 oz)   SpO2 100%   BMI 58.71 kg/m     NAD breathing unlabored    ASSESSMENT AND PLAN:    Has bile duct dilatation with recent admission/transfer for bile duct debris.      Will plan laparoscopic cholecystectomy.    Same day surgery.    [facility].    Call Jose Luis at [phone] for scheduling.    Follow-up with Jonelle Lainez, [phone] for discussion of anti-obesity medications.        Pouch eating directions:    Regular food only (chewable food).    Only eat what you can  with a fork.    Avoid all liquid foods (anything pourable that has calories in it) from now on.  Avoid all crumbly foods (crackers, cookies, chips, toast) and all starch-based foods (potatoes, bread, pasta, rice).    Start with protein food in each meal (steak, chicken, fish, beans, eggs); then leafy vegetables (dry leafy vegetables such as spinach, kale, wayne, dandelions, mixed greens, etc.); then solid fruits.    No drinking while eating.  Try holding off on drinking until 60 or more minutes after eating.        1. 10 years status laparoscopic gastric bypass  2. Morbid Obesity current BMI: Body mass index is 58.71 kg/m .  3. Post surgical malabsorption:   Labs ordered per protocol.   Follow food plan per dietitian recommendations.   Continue taking recommended post-op vitamins.  4. Return to clinic to see Jonelle GRIGGS or Fatou nelson.    Sincerely,    Salvador Walter MD    I spent a total of 30 minutes face to face with Morgan during today's office visit. Over 50% of this time was spent counseling the patient and/or coordinating care.  "

## 2021-07-08 NOTE — PROGRESS NOTES
"[facility]:  PHQ-9 Screening Note    SITUATION/BACKGROUND                                                    Morgan Mcbride is a 31 year old female who completed the PHQ-9 assessment for depression and Score is >9.    Onset of symptoms: gradual  Trigger: recently gave birth - 1 month ago  Recent related events: has a blended family with older children and younger children.  Older children are not much help around the house.  Younger two are 3 and 7.  Prior history of suicide attempt or self harm: No   Risk Factors: recent birth  History of depression or mental illness: No   Medications reviewed: Yes     ASSESSMENT      A. Are any of the following present? NO     Suicidal thoughts with a plan and means to carry out the plan?     Intent to harm others?    Altered mental status: confusion, delusional, psychotic?  YES  - Patient should be seen in the ED.  If patient is willing to go to the ED, call [facility] Non Emergent Transportation at [phone].  If patient is unwilling to go to the ED, call 911.   Clinic staff to fill out the  Transportation Hold  form.    Place order for referral to behavioral health team for  regular  follow-up.    NO - go to B   B. Are any of the following present?  NO     Suicidal thoughts without a plan or means to carry out the plan    New onset of delusional ideas    Past inpatient admission for depression    New onset and recent change or addition of new medication YES  - Patient should receive crises care within 2-4 hours. Offer emergency room care or connect with any of the *crisis resources.     Place referral to behavioral health team for \"regular\" follow-up.    NO - go to C   C. Are any of the following present?      Previous suicide attempts    Depression interfering with ability to work or function    Loss of appetite and eating poorly    Abrupt cessation of drugs (OTC or RX), alcohol or caffeine    Drug or alcohol abuse YES -  [facility] behavior health team. If no " "response, patient should receive crisis care within 24 hours.     Place referral to behavioral health team for \"regular\" follow-up.     NO - go to D   D. Are several of the following present? YES     Difficulty concentrating    Difficulty sleeping    Reduced interest in sexual activity or impotency    Irregular or absent menstruation    No interest in activity    Change in interpersonal relationships    Increased use/abuse of alcohol or drugs    Pregnant or recent child birth    Recent major life change    History of depression YES -  Follow-up with PCP for appointment and follow home care instructions.    Place referral to behavioral health team for \"regular\" follow-up.    NO - provide home care instructions.        PLAN      Home Care Instructions:   If currently in counseling, call counselor for appointment  Call local crisis interventions  Contact friends or family for support  Increase exercise and enjoyable activities  East a well-balanced diet, drink plenty of fluids and rest as needed  Alcohol and other recreational drugs can worsen depression.  If heavy use of drugs or alcohol, contact counselor or PCP to help reduce consumption.    Report the following to your PCP:   Suicidal thoughts without a plan or means to carry out the plan  Depression interferes with daily activities  Persistent inability to sleep    Seek emergency care immediately if any of the following occur:   Suicidal thoughts and plan and means to carry out the plan  Injury to self or others    BEHAVIORAL HEALTH TEAMS      [facility] - Behavioral Health Team    [facility] Pager: [phone]    [place]  - Behavioral Health Team    Pager number: [phone]    Referral to Behavioral Health   [facility] [[id number]}    RESOURCES      - 24/7 Crisis Hotlines: National Suicide Prevention Hotline  653-403-CMRR (7343)  - Non Emergent Transportation:  [facility] (east location): [phone]  - [facility] Pagers:  [facility]: pager #: [phone]  - " Crisis Hotlines by County:  [facility] for [facility]: 621-089-8082  - [facility] Crisis Response Team:  [phone] ([phone])  [facility] Crisis is available 24 hours a day. The team provides callers with a needs assessment and referrals to appropriate resources. If medically necessary, the Crisis Response Team assists individuals by traveling to their location to provide face-to-face interventions and assessments. Crisis services are available for adults and children in [facility] and [facility]. Adult Crisis beds are also available if appropriate.  - Urgent Care for Adult Mental Health:  [phone]  (24 hours a day)  [address], [place] [address]  DROP IN:  Monday - Friday: 8:00 am - 7:00 pm  Saturday: 11:00 am - 3:00 pm   Sunday and Holidays: Closed  - Walk-In Centers and Mobile Teams:  [facility] (18+) Crisis Mobile Team: [phone]  - Walk-In Counseling Center:  [phone]  [address] [place] [address]  Hours:  M, W, F:  1:00-3:00PM      M-Th:  6:30-8:30PM  - [facility]:  [phone]  [address] [place] [address]  Hours:  M, W:      5:00-7:00PM   - Neighborhood House:  [phone]  [address], [place]  Hours:  T, Th:      6:00-8:00PM  - Crisis Housing (operated by [facility]):  [facility] Residence: [address] [place] [address]  PH: [phone]  - Additional Crisis Hotlines:  [facility] Social Service (LSS): [phone], National Suicide Prevention Hotline: 402-063-AQNA (9036), [facility] (formerly [facility]): Dial 2-1-19 (191-091-1910)    Oneida Umaña RN    Follow up MyChart with resources sent to patient.    Copyright 2016 [employer] [employer]

## 2021-07-08 NOTE — NURSING NOTE
Depression Response    Patient completed the PHQ-9 assessment for depression and scored >9? Yes  Question 9 on the PHQ-9 was positive for suicidality? No  Does patient have current mental health provider? No    Is this a virtual visit? No    I personally notified the following: clinic nurse

## 2021-07-08 NOTE — NURSING NOTE
"Chief Complaint   Patient presents with     New Patient     New consult to discuss cholecystectomy       Vitals:    07/08/21 1407   BP: (!) 142/86   BP Location: Left arm   Patient Position: Sitting   Cuff Size: Adult Regular   Pulse: 67   Temp: 99.1  F (37.3  C)   TempSrc: Oral   SpO2: 100%   Weight: 140.9 kg (310 lb 11.2 oz)   Height: 1.549 m (5' 1\")       Body mass index is 58.71 kg/m .      Apolonia Mann CMA    "

## 2021-07-08 NOTE — LETTER
7/8/2021       RE: Morgan Mcbride  [address] [facility] [address]     Dear Colleague,    Thank you for referring your patient, Morgan Mcbride, to the [facility] GENERAL SURGERY [facility] [place] [facility]. Please see a copy of my visit note below.    [facility]:  PHQ-9 Screening Note    SITUATION/BACKGROUND                                                    Morgan Mcbride is a 31 year old female who completed the PHQ-9 assessment for depression and Score is >9.    Onset of symptoms: gradual  Trigger: recently gave birth - 1 month ago  Recent related events: has a blended family with older children and younger children.  Older children are not much help around the house.  Younger two are 3 and 7.  Prior history of suicide attempt or self harm: No   Risk Factors: recent birth  History of depression or mental illness: No   Medications reviewed: Yes     ASSESSMENT      A. Are any of the following present? NO     Suicidal thoughts with a plan and means to carry out the plan?     Intent to harm others?    Altered mental status: confusion, delusional, psychotic?  YES  - Patient should be seen in the ED.  If patient is willing to go to the ED, call [facility] Non Emergent Transportation at [phone].  If patient is unwilling to go to the ED, call 911.   Clinic staff to fill out the  Transportation Hold  form.    Place order for referral to behavioral health team for  regular  follow-up.    NO - go to B   B. Are any of the following present?  NO     Suicidal thoughts without a plan or means to carry out the plan    New onset of delusional ideas    Past inpatient admission for depression    New onset and recent change or addition of new medication YES  - Patient should receive crises care within 2-4 hours. Offer emergency room care or connect with any of the *crisis resources.     Place referral to behavioral health team  "for \"regular\" follow-up.    NO - go to C   C. Are any of the following present?      Previous suicide attempts    Depression interfering with ability to work or function    Loss of appetite and eating poorly    Abrupt cessation of drugs (OTC or RX), alcohol or caffeine    Drug or alcohol abuse YES -  [facility] behavior health team. If no response, patient should receive crisis care within 24 hours.     Place referral to behavioral health team for \"regular\" follow-up.     NO - go to D   D. Are several of the following present? YES     Difficulty concentrating    Difficulty sleeping    Reduced interest in sexual activity or impotency    Irregular or absent menstruation    No interest in activity    Change in interpersonal relationships    Increased use/abuse of alcohol or drugs    Pregnant or recent child birth    Recent major life change    History of depression YES -  Follow-up with PCP for appointment and follow home care instructions.    Place referral to behavioral health team for \"regular\" follow-up.    NO - provide home care instructions.        PLAN      Home Care Instructions:   If currently in counseling, call counselor for appointment  Call local crisis interventions  Contact friends or family for support  Increase exercise and enjoyable activities  East a well-balanced diet, drink plenty of fluids and rest as needed  Alcohol and other recreational drugs can worsen depression.  If heavy use of drugs or alcohol, contact counselor or PCP to help reduce consumption.    Report the following to your PCP:   Suicidal thoughts without a plan or means to carry out the plan  Depression interferes with daily activities  Persistent inability to sleep    Seek emergency care immediately if any of the following occur:   Suicidal thoughts and plan and means to carry out the plan  Injury to self or others    BEHAVIORAL HEALTH TEAMS      [facility] - Behavioral Health Team    [facility] Pager: [phone]    [place]  - Behavioral Health " Team    Pager number: [phone]    Referral to Behavioral Health   [facility] SOWQ [00665}    RESOURCES      - 24/7 Crisis Hotlines: National Suicide Prevention Hotline  754-757-FEVF (3807)  - Non Emergent Transportation:  [facility] ([facility] location): [phone]  [facility] Pagers:  [facility]: pager #: [phone]  - Crisis Hotlines by County:  Neeraj Ratliff CANDACE for Adults: [phone]  - [facility] Crisis Response Team:  [phone] ([phone])  [facility] Crisis is available 24 hours a day. The team provides callers with a needs assessment and referrals to appropriate resources. If medically necessary, the Crisis Response Team assists individuals by traveling to their location to provide face-to-face interventions and assessments. Crisis services are available for adults and children in [facility] and [facility]. Adult Crisis beds are also available if appropriate.  - Urgent Care for Adult Mental Health:  [phone]  (24 hours a day)  [address], [place] [address]  DROP IN:  Monday - Friday: 8:00 am - 7:00 pm  Saturday: 11:00 am - 3:00 pm   Sunday and Holidays: Closed  - Walk-In Centers and Mobile Teams:  Neeraj Matt MARIA (18+) Crisis Mobile Team: [phone]  - Walk-In Counseling Center:  [phone]  [address] [place] [address]  Hours:  M, W, F:  1:00-3:00PM      M-Th:  6:30-8:30PM  - [facility]:  [phone]  [address] [place] [address]  Hours:  M, W:      5:00-7:00PM   - Neighborhood House:  [phone]  [address], [place]  Hours:  T, Th:      6:00-8:00PM  - Crisis Housing (operated by [employer]):  [facility] L[facility] Residence: [address] [place] [address]  PH: [phone]  - Additional Crisis Hotlines:  [facility] (S): 754-092-0811, National Suicide Prevention Hotline: 607-722-PHMK (6961), [facility] (formerly [facility] Help): Dial 2-1-10 (636-727-6741)    Oneida Umaña RN    Follow up [facility] with  resources sent to patient.    Copyright 2016 [facility]              Return Bariatric Surgery Note    RE: Morgan Mcbride  MR#: [id number]  : 1990  VISIT DATE: 2021    Dear Antoni Bonilla,    I had the pleasure of seeing your patient, Morgan Mcbride, in my post-bariatric surgery assessment clinic.    CHIEF COMPLAINT: Post-bariatric surgery follow-up    HISTORY OF PRESENT ILLNESS:  No flowsheet data found.    Weight History:  No flowsheet data found.     Weight: 140.9 kg (310 lb 11.2 oz)             No flowsheet data found.    Eating Habits 2020   How many meals do you eat per day? 2   Do you snack between meals? No       Exercise Questions Reviewed With Patient 2020   How often do you exercise? 3 to 4 times per week   What is the duration of your exercise (in minutes)? 60+ Minutes   What types of exercise do you do? walking, gym membership   What keeps you from being more active?  I am as active as I can possbily be       Social History:  No flowsheet data found.    Medications:  Current Outpatient Medications   Medication     acetaminophen (TYLENOL) 325 MG tablet     buPROPion (WELLBUTRIN SR) 100 MG 12 hr tablet     cyclobenzaprine (FLEXERIL) 10 MG tablet     hydrOXYzine (ATARAX) 25 MG tablet     lisinopril (ZESTRIL) 5 MG tablet     Magnesium Hydroxide (DULCOLAX PO)     medroxyPROGESTERone (DEPO-PROVERA) 150 MG/ML syringe     Melatonin 5 MG CHEW     naltrexone (DEPADE/REVIA) 50 MG tablet     omeprazole (PRILOSEC OTC) 20 MG EC tablet     omeprazole (PRILOSEC) 40 MG DR capsule     oxyCODONE (OXY-IR) 5 MG capsule     polyethylene glycol (MIRALAX) 17 GM/Dose powder     SENNA-docusate sodium (SENNA S) 8.6-50 MG tablet     simethicone (MYLICON) 80 MG chewable tablet     No current facility-administered medications for this visit.      No flowsheet data found.    ROS:  GI: No flowsheet data found.  Skin: No flowsheet data found.  Psych: No flowsheet data found.  Female  Only:   FLAKITO JASWANT ROS - FEMALE ONLY 12/12/2020   Female only: None of the above       LABS/IMAGING/MEDICAL RECORDS REVIEW:     Endoscopy:  Findings:       The examined esophagus was normal with gastroesophageal junction at 36cm.       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 Gastrojejunal anastomosis is        healthy-appearing without stricture or ulceration.       The examined jejunum was normal. Biopsies were taken with a cold forceps        for histology. Estimated blood loss was minimal.    Impressions/Post-Op Diagnosis:       - Normal esophagus.       - Leticia-en-Y gastrojejunostomy.       - Normal examined jejunum. Biopsied.    Recommendation:       - Await pathology results.        ____________________  April JOHANA Khan MD  4/4/2019 12:21:29 PM  This report has been signed electronically      Study Result    MRCP Without and With Contrast     CLINICAL HISTORY: evaluate enlarged common bile duct, enlarged bile  duct, h/o gastric bypass, increasing abdominal pain     DATE: 6/17/2021 1:24 PM     TECHNIQUE:      Images were acquired with and without intravenous gadolinium contrast  through the upper abdomen. The following MR images were acquired  without intravenous contrast: TrueFISP, multiplanar T2-weighted, axial  T1 in/out of phase, T2-weighted MRCP images, axial diffusion-weighted  and axial apparent diffusion coefficient. T1-weighted images were  obtained before contrast at the multiple time points following  contrast injection. 3-D reformatted images were generated by the  technologist. Secretin was administered and additional MRCP images  were obtained. Contrast dose: 14ml iv Gadavist     Comparison study: CT 6/2/2021     FINDINGS:     Biliary Tree: No intrahepatic or extrahepatic biliary dilatation. No  definite obstructing stone or mass.     Pancreas: Normal appearance and signal characteristics of the  pancreatic parenchyma. A 6 mm focus  of a focal fat is noted in the  pancreas. No focal enhancing masses. Pancreatic duct normal in  caliber. No side branch ductal dilatation. Pancreas divisum. Normal  response to secretin with contrast extending to the jejunum at 10  minutes.     Liver: The liver surface is smooth. No focal suspicious masses  identified. No significant hepatic steatosis or iron deposition.     Gallbladder: Distended without wall thickening. No pericholecystic  inflammatory changes.        Spleen: Normal, not enlarged.     Kidneys: No kidney stone, cysts or masses. No pelvocaliectasis.      Adrenal glands: Normal.      Bowel: Unremarkable, with no evidence of bowel dilatation or abnormal  wall enhancement.        Lymph nodes: No abdominal lymphadenopathy by size criteria.     Blood vessels: Major blood vessels are patent with no evidence of clot  or obstruction.         Lung bases: No abnormal T2 hyperintensity in the lung bases.     Bones and soft tissues: No evidence of acute bony abnormality or  abnormal soft tissue enhancement.        Mesentery and abdominal wall: Unremarkable.     Ascites: No free fluid is detected.                                                                        IMPRESSION:  1. No intrahepatic or extrahepatic biliary dilatation. No definite  obstructing stone or mass.  2. Gallbladder is distended without definite evidence of acute  cholecystitis.  3. Pancreas divisum.     I have personally reviewed the examination and initial interpretation  and I agree with the findings.     HARPREET BRITTON MD       US ABDOMEN UPPER5/26/2021  [facility]   Result Impression   IMPRESSION:    Dilated common bile duct measuring 2.1 cm. There appears to be internal echogenic debris suggestive of sludge and/or small stones. No intrahepatic biliary duct dilatation. No gallstone identified. No sonographic evidence of cholecystitis. MRCP without contrast could be obtained for further evaluation if clinically  warranted.    No hydronephrosis.      REPORT SIGNED BY DR. Eh Lopez   Result Narrative   EXAM: ULTRASOUND ABDOMEN    DATE: 5/26/2021 9:00 PM    COMPARISON: None    CLINICAL DATA: Vomiting.    ADDITIONAL CLINICAL DATA:    FINDINGS:    Gallbladder: Normal. No stones, wall thickening, or pericholecystic fluid.    Bile Ducts: Common bile duct dilated measuring 2.1 cm. There appears to be internal echogenic debris.     Liver: Normal.    Spleen: Normal. 10.7 cm    Kidneys: Normal. No hydronephrosis. Right kidney 10.9 x 4.5 x 5.2 cm and left kidney 11.2 x 5.7 x 5.9 cm    Pancreas: Visualized head and body of pancreas unremarkable. Remainder the pancreas obscured by bowel gas    Aorta: Proximal aorta patent. Mid and distal aorta obscured by bowel gas    IVC: Patent.    Main portal vein: Patent.   Other Result Information   Interface, Nmhcradordrslt In - 05/26/2021  9:33 PM CDT  Formatting of this note might be different from the original.  EXAM: ULTRASOUND ABDOMEN    DATE: 5/26/2021 9:00 PM    COMPARISON: None    CLINICAL DATA: Vomiting.    ADDITIONAL CLINICAL DATA:    FINDINGS:    Gallbladder: Normal. No stones, wall thickening, or pericholecystic fluid.    Bile Ducts: Common bile duct dilated measuring 2.1 cm. There appears to be internal echogenic debris.     Liver: Normal.    Spleen: Normal. 10.7 cm    Kidneys: Normal. No hydronephrosis. Right kidney 10.9 x 4.5 x 5.2 cm and left kidney 11.2 x 5.7 x 5.9 cm    Pancreas: Visualized head and body of pancreas unremarkable. Remainder the pancreas obscured by bowel gas    Aorta: Proximal aorta patent. Mid and distal aorta obscured by bowel gas    IVC: Patent.    Main portal vein: Patent.      IMPRESSION  IMPRESSION:    Dilated common bile duct measuring 2.1 cm. There appears to be internal echogenic debris suggestive of sludge and/or small stones. No intrahepatic biliary duct dilatation. No gallstone identified. No sonographic evidence of cholecystitis. MRCP without contrast  could be obtained for further evaluation if clinically warranted.    No hydronephrosis.      REPORT SIGNED BY DR. Eh Lopez     6/2/21 10:52 AM [id number] [facility]    PACS Images     Show images for CT Abdomen Pelvis w Contrast   Study Result    EXAMINATION: CT ABDOMEN PELVIS W CONTRAST, 6/2/2021 10:52 AM     TECHNIQUE:  Helical CT images from the lung bases through the  symphysis pubis were obtained with IV contrast. Contrast dose: 135mL  of isovue 370     COMPARISON: 5/30/2021     HISTORY: Nausea/vomiting; Post partum x 3 days. worsening colicky  abdominal pain/nausea     FINDINGS:     Abdomen and pelvis: No focal hepatic lesion. The gallbladder, urinary  tree, pancreas, spleen, adrenal glands, and renal cortices are normal.  No hydronephrosis, hydroureter, or urinary tract stone. Normal  bladder. The uterus is enlarged with myometrial heterogeneity,  compatible with recently gravid state. No appreciable endometrial  abnormality. Nonenlarged ovaries. No free fluid or free air. No bowel  obstruction or inflammation. Moderate colonic stool burden. Normal  appendix. Surgical changes of Leticia-en-Y gastric bypass. The major  abdominal vasculature is patent. No abdominal or pelvic  lymphadenopathy.     Lung bases/lower chest:  Subsegmental atelectasis in the lung bases.  No pleural or pericardial effusion.     Bones and soft tissues: No acute or aggressive osseous abnormality.  Foci of air in the subcutaneous fat of the anterior abdominal wall,  likely related to subcutaneous injections.                                                                      IMPRESSION:   1. No acute intra-abdominal pathology.  2. Expected postpartum enlargement of the uterus.   3. No intra or extrahepatic biliary dilation in this patient with  history of choledocholithiasis (per clinical note). If there is  ongoing concern for biliary pathology, consider MRCP.     WILMA MARCANO, DO         PHYSICAL EXAMINATION:  BP  "(!) 142/86 (BP Location: Left arm, Patient Position: Sitting, Cuff Size: Adult Regular)   Pulse 67   Temp 99.1  F (37.3  C) (Oral)   Ht 1.549 m (5' 1\")   Wt 140.9 kg (310 lb 11.2 oz)   SpO2 100%   BMI 58.71 kg/m     NAD breathing unlabored    ASSESSMENT AND PLAN:    Has bile duct dilatation with recent admission/transfer for bile duct debris.      Will plan laparoscopic cholecystectomy.    Same day surgery.    [facility].    Call Jose Luis at [phone] for scheduling.    Follow-up with Jonelle or Fatou, [phone] for discussion of anti-obesity medications.        Pouch eating directions:    Regular food only (chewable food).    Only eat what you can  with a fork.    Avoid all liquid foods (anything pourable that has calories in it) from now on.  Avoid all crumbly foods (crackers, cookies, chips, toast) and all starch-based foods (potatoes, bread, pasta, rice).    Start with protein food in each meal (steak, chicken, fish, beans, eggs); then leafy vegetables (dry leafy vegetables such as spinach, kale, wayne, dandelions, mixed greens, etc.); then solid fruits.    No drinking while eating.  Try holding off on drinking until 60 or more minutes after eating.        1. 10 years status laparoscopic gastric bypass  2. Morbid Obesity current BMI: Body mass index is 58.71 kg/m .  3. Post surgical malabsorption:   Labs ordered per protocol.   Follow food plan per dietitian recommendations.   Continue taking recommended post-op vitamins.  4. Return to clinic to see Jonelle GRIGGS or Fatou nelson.    Sincerely,    Salvador Walter MD    I spent a total of 30 minutes face to face with Morgan during today's office visit. Over 50% of this time was spent counseling the patient and/or coordinating care.      Again, thank you for allowing me to participate in the care of your patient.      Sincerely,    Salvador Walter MD      "

## 2021-07-14 ENCOUNTER — TELEPHONE (OUTPATIENT)
Dept: SURGERY | Facility: CLINIC | Age: 31
End: 2021-07-14

## 2021-07-14 DIAGNOSIS — Z11.59 ENCOUNTER FOR SCREENING FOR OTHER VIRAL DISEASES: ICD-10-CM

## 2021-07-14 PROBLEM — K80.50 CHOLEDOCHOLITHIASIS: Status: ACTIVE | Noted: 2021-07-14

## 2021-07-14 NOTE — TELEPHONE ENCOUNTER
Called patient to discuss surgery date for laparoscopic cholecystectomy with Dr. Walter. She was hoping before 7/24 when her  goes back to work. Time not available. Scheduled 7/30/21 at [place] with 10:30 a.m. case start time, to arrive 8:30 a.m.   Discussed the need for COVID testing on Monday 7/26. She was on her way out the door to a dental appointment. She will call me back later this week or early next week to schedule.

## 2021-07-14 NOTE — TELEPHONE ENCOUNTER
FUTURE VISIT INFORMATION      SURGERY INFORMATION:    Date: 21    Location: [facility] OR    Surgeon:  Salvador Walter MD    Anesthesia Type:  General    Procedure: CHOLECYSTECTOMY, LAPAROSCOPIC    Consult: ov 21    RECORDS REQUESTED FROM:       Primary Care Provider: [facility]    Pertinent Medical History: hypertension    Most recent EKG+ Tracin/15/17- [facility]

## 2021-07-16 ENCOUNTER — TELEPHONE (OUTPATIENT)
Dept: SURGERY | Facility: CLINIC | Age: 31
End: 2021-07-16

## 2021-07-16 NOTE — TELEPHONE ENCOUNTER
OR scheduling called to see if case on 7/30 could be moved up from 10:30 start time to 7:30 a.m. Contacted patient to see if that would work for her and she said that works even better. Case moved up to 7:30, to arrive at 5:30 a.m. [place].

## 2021-07-19 RX ORDER — BUPROPION HYDROCHLORIDE 150 MG/1
150 TABLET ORAL EVERY MORNING
COMMUNITY
End: 2021-07-22

## 2021-07-19 RX ORDER — METFORMIN HCL 500 MG
500 TABLET, EXTENDED RELEASE 24 HR ORAL 2 TIMES DAILY WITH MEALS
COMMUNITY
End: 2021-07-22

## 2021-07-19 NOTE — PHARMACY - PREOPERATIVE ASSESSMENT CENTER
Preoperative Assessment Center Medication History Note    Medication history completed on July 19, 2021 by this writer. See Epic admission navigator for prior to admission medications. Operating room staff will still need to confirm medications and last dose information on day of surgery.     Medication history interview sources  Patient interview: Yes  Care Everywhere records: Yes  Surescripts pharmacy refill records: Yes    Changes made to PTA medication list (reason)  Added: None    Deleted: None    Changed: None    Additional medication history information (including reliability of information, actions taken by pharmacist):    -- No recent (within 30 days) course of antibiotics  -- No recent (within 30 days) course of systemic steroids  -- Patient declines being on any other prescription or over-the-counter medications  -- patient received her last depo Provera injection in June  -- patient reports that she hasn't been taking any other medications as she recently had a baby so reports not taking medications for the past few months.     Prior to Admission medications    Medication Sig Last Dose Taking? Auth Provider   medroxyPROGESTERone (DEPO-PROVERA) 150 MG/ML syringe Inject 150 mg into the muscle every 3 months  Taking Yes Reported, Patient   buPROPion (WELLBUTRIN XL) 150 MG 24 hr tablet Take 150 mg by mouth every morning  Patient not taking: Reported on 7/19/2021 Not Taking  Unknown, Entered By History   cyclobenzaprine (FLEXERIL) 10 MG tablet Take 1 tablet (10 mg) by mouth 3 times daily as needed for muscle spasms  Patient not taking: Reported on 7/8/2021 Not Taking  María Hunter MD   hydrOXYzine (ATARAX) 25 MG tablet Take 50 mg by mouth as needed   Patient not taking: Reported on 7/19/2021 Not Taking  Reported, Patient   lisinopril (ZESTRIL) 5 MG tablet Take 5 mg by mouth  Patient not taking: Reported on 7/19/2021 Not Taking  Reported, Patient   metFORMIN (GLUCOPHAGE-XR) 500 MG 24 hr tablet Take  500 mg by mouth 2 times daily (with meals)  Patient not taking: Reported on 7/19/2021 Not Taking  Unknown, Entered By History   naltrexone (DEPADE/REVIA) 50 MG tablet Take 1/2 tablet once daily 1-2 hours prior to worst cravings for 1 week, then increase to 1 tablet daily as directed if tolerating  Patient not taking: Reported on 7/8/2021 Not Taking  Jonelle Villeda PA-C   omeprazole (PRILOSEC OTC) 20 MG EC tablet Take 1 tablet (20 mg) by mouth daily  Patient not taking: Reported on 7/8/2021 Not Taking  María Hunter MD   omeprazole (PRILOSEC) 40 MG DR capsule Take 1 capsule (40 mg) by mouth daily  Patient not taking: Reported on 7/8/2021 Not Taking  Fatou Maki NP   oxyCODONE (OXY-IR) 5 MG capsule Take 1-2 capsules (5-10 mg) by mouth every 4 hours as needed for severe pain  Patient not taking: Reported on 7/8/2021 Not Taking  María Hunter MD   polyethylene glycol (MIRALAX) 17 GM/Dose powder Take 17 g (1 capful) by mouth daily  Patient not taking: Reported on 7/8/2021 Not Taking  Fatou Maki NP   SENNA-docusate sodium (SENNA S) 8.6-50 MG tablet Take 1 tablet by mouth At Bedtime  Patient not taking: Reported on 7/8/2021 Not Taking  Jessika Lepe MD   simethicone (MYLICON) 80 MG chewable tablet Take 1 tablet (80 mg) by mouth 4 times daily  Patient not taking: Reported on 7/8/2021 Not Taking  María Hunter MD       Medication history completed by: Anant Gardner [facility]

## 2021-07-20 ENCOUNTER — PRE VISIT (OUTPATIENT)
Dept: SURGERY | Facility: CLINIC | Age: 31
End: 2021-07-20

## 2021-07-20 ENCOUNTER — ANESTHESIA EVENT (OUTPATIENT)
Dept: SURGERY | Facility: CLINIC | Age: 31
End: 2021-07-20

## 2021-07-20 ENCOUNTER — VIRTUAL VISIT (OUTPATIENT)
Dept: SURGERY | Facility: CLINIC | Age: 31
End: 2021-07-20
Payer: COMMERCIAL

## 2021-07-20 DIAGNOSIS — Z01.818 PREOP EXAMINATION: Primary | ICD-10-CM

## 2021-07-20 PROCEDURE — 99204 OFFICE O/P NEW MOD 45 MIN: CPT | Mod: 95 | Performed by: PHYSICIAN ASSISTANT

## 2021-07-20 RX ORDER — LIDOCAINE 40 MG/G
CREAM TOPICAL
Status: DISCONTINUED | OUTPATIENT
Start: 2021-07-20 | End: 2021-07-20 | Stop reason: HOSPADM

## 2021-07-20 RX ORDER — SODIUM CHLORIDE, SODIUM LACTATE, POTASSIUM CHLORIDE, CALCIUM CHLORIDE 600; 310; 30; 20 MG/100ML; MG/100ML; MG/100ML; MG/100ML
INJECTION, SOLUTION INTRAVENOUS CONTINUOUS
Status: CANCELLED | OUTPATIENT
Start: 2021-07-20

## 2021-07-20 ASSESSMENT — LIFESTYLE VARIABLES: TOBACCO_USE: 0

## 2021-07-20 ASSESSMENT — PAIN SCALES - GENERAL: PAINLEVEL: NO PAIN (0)

## 2021-07-20 NOTE — ANESTHESIA PREPROCEDURE EVALUATION
Anesthesia Pre-Procedure Evaluation    Patient: Morgan Mcbride   MRN: [id number] : 1990        Preoperative Diagnosis: * No surgery found *   Procedure :      Past Medical History:   Diagnosis Date     Anemia, iron deficiency      Depression      Esophageal reflux      Hypertension      Obesity       Past Surgical History:   Procedure Laterality Date     BIOPSY       ESOPHAGOSCOPY, GASTROSCOPY, DUODENOSCOPY (EGD), COMBINED  2012    Procedure: COMBINED ESOPHAGOSCOPY, GASTROSCOPY, DUODENOSCOPY (EGD);;  Surgeon: Chris Garcia MD;  [facility]     ESOPHAGOSCOPY, GASTROSCOPY, DUODENOSCOPY (EGD), COMBINED N/A 2014    Procedure: COMBINED ESOPHAGOSCOPY, GASTROSCOPY, DUODENOSCOPY (EGD), BIOPSY SINGLE OR MULTIPLE;  Surgeon: Salvador Walter MD;  [facility]     LAPAROSCOPIC BYPASS GASTRIC  2012    Procedure:LAPAROSCOPIC BYPASS GASTRIC; Laparoscopic Leticia-En-Y Gastric Bypass; Surgeon:SALVADOR WALTER; Location:[facility]     US BREAST BIOPSY RT        Allergies   Allergen Reactions     Nsaids GI Disturbance and Other (See Comments)     Hx gastric bypass      Social History     Tobacco Use     Smoking status: Never Smoker     Smokeless tobacco: Never Used   Substance Use Topics     Alcohol use: No      Wt Readings from Last 1 Encounters:   21 140.9 kg (310 lb 11.2 oz)        Anesthesia Evaluation   Pt has had prior anesthetic.     No history of anesthetic complications       ROS/MED HX  ENT/Pulmonary:  - neg pulmonary ROS  (-) tobacco use and asthma   Neurologic:  - neg neurologic ROS     Cardiovascular:     (+) hypertension-----    METS/Exercise Tolerance: >4 METS    Hematologic:     (+) anemia,  (-) history of blood clots and history of blood transfusion   Musculoskeletal:  - neg musculoskeletal ROS     GI/Hepatic:     (+) cholecystitis/cholelithiasis,  (-) GERD   Renal/Genitourinary:  - neg Renal ROS     Endo:     (+) Obesity,     Psychiatric/Substance Use:     (+)  "psychiatric history anxiety and depression     Infectious Disease:  - neg infectious disease ROS     Malignancy:  - neg malignancy ROS     Other:               OUTSIDE LABS:  CBC:   Lab Results   Component Value Date    WBC 15.4 (H) 06/02/2021    WBC 20.4 (H) 05/30/2021    HGB 10.5 (L) 06/02/2021    HGB 9.8 (L) 06/01/2021    HCT 31.6 (L) 06/02/2021    HCT 30.2 (L) 05/30/2021     06/03/2021     06/02/2021     BMP:   Lab Results   Component Value Date     06/02/2021     05/30/2021    POTASSIUM 4.2 06/02/2021    POTASSIUM 4.0 05/30/2021    CHLORIDE 105 06/02/2021    CHLORIDE 107 05/30/2021    CO2 28 06/02/2021    CO2 22 05/30/2021    BUN 8 06/02/2021    BUN 5 (L) 05/30/2021    CR 0.66 06/02/2021    CR 0.62 05/30/2021    GLC 71 06/02/2021    GLC 91 05/30/2021     COAGS: No results found for: PTT, INR, FIBR  POC:   Lab Results   Component Value Date    BGM 94 05/21/2012    HCG Positive (A) 10/07/2016     HEPATIC:   Lab Results   Component Value Date    ALBUMIN 2.4 (L) 06/02/2021    PROTTOTAL 6.3 (L) 06/02/2021    ALT 16 06/02/2021    AST 17 06/02/2021    ALKPHOS 108 06/02/2021    BILITOTAL 0.1 (L) 06/02/2021     OTHER:   Lab Results   Component Value Date    A1C 5.8 12/18/2014    MIGUE 9.0 06/02/2021    PHOS 4.6 (H) 05/19/2012    MAG 2.2 05/19/2012    LIPASE 118 06/02/2021    AMYLASE 60 06/02/2021             PAC Discussion and Assessment    ASA Classification: 3  Case is suitable for: [place]  Anesthetic techniques and relevant risks discussed: GA  Invasive monitoring and risk discussed: No    Possibility and Risk of blood transfusion discussed: No            PAC Resident/NP Anesthesia Assessment: \"Ne Ne\" Reed is a 31 year old female scheduled for laparoscopic cholecystectomy on 7/30/21 by Dr. Walter in the treatment of choledocolithiasis.  PAC referral for risk assessment and optimization for anesthesia:    Pre-operative considerations:  1.  Cardiac:  Functional status- METS >4. She can " walk >2 blocks and has been walking recently since birth of her baby 21.  Intermediate risk surgery with 0.9% (RCRI #) risk of major adverse cardiac event.   --denies chest pain, SOB, PIEDRA, palpitations  --h/o HTN, but reports she has not needed antihypertensives since birth of baby    2.  Pulm: MEGHAN risk: Intermediate  --never smoker    3.  GI:  Risk of PONV score = 3.  If > 2, anti-emetic intervention recommended.  --h/o Leticia-en-Y    --presented to outside ED for RUQ pain and vomiting , abdominal US shows internal echogenic debris suggestive of sludge and/or small stones.  She was transferred to [place] where she was induced and delivered.  --above procedure now planned.    4.  Heme:  --iron deficiency anemia, hgb 10.5, 21.  Recheck DOS.    5.  Psych:  --hx of anxiety and depression, no current medications    6.  Other:  -- 21, doing well   --update labs DOS    VTE risk: 1.8% (BMI, FH of VTE)    Patient is optimized and is acceptable candidate for the proposed procedure.  No further diagnostic evaluation is needed.         **For further details of assessment, testing, and physical exam please see H and P completed on same date.          Melissa Cardenas PA-C, [facility]    Reviewed and Signed by PAC Mid-Level Provider/Resident  Mid-Level Provider/Resident: Melissa Cardenas  Date: 21                                 Melissa Cardenas PA-C

## 2021-07-20 NOTE — PROGRESS NOTES
Pippa Das is a 31 year old who is being evaluated via a billable video visit.      How would you like to obtain your AVS? MyChart    HPI         Review of Systems         Objective    Vitals - Patient Reported  Pain Score: No Pain (0)        Physical Exam     PATTI Mckinney LPN

## 2021-07-20 NOTE — H&P
Pre-Operative H & P     CC:  Preoperative exam to assess for increased cardiopulmonary risk while undergoing surgery and anesthesia.    Date of Encounter: 2021  Primary Care Physician:  Antoni Bonilla  Associated Diagnosis: choledocholithiasis        Video-Visit Details    Type of service:  Video Visit    Patient verbally consented to video service today: YES      Video Call Length: 10 minutes    Distant Location (provider location):  home    Mode of Communication:  Video Conference via Doximity        HPI  Morgan Mcbride is a 31 year old female who presents for pre-operative H & P in preparation for laparoscopic cholecystectomy with Dr. Walter on 21 at [facility].     This is a 31 year old female with PMH significant for hypertension, morbid obesity s/p Leticia-en-Y 2012, anemia, GERD, depression, anxiety, and recent  of baby girl 21.  She presented to outside ED 21 for right upper quadrant abdominal pain, nausea and vomiting.  She was admitted and then transferred to [facility] location where she was induced and delivered her baby on 2021.  She has now scheduled for the above procedure. Patient states that she no longer is on any antihypertensives and has not needed them since delivery.  She has no history of cardiopulmonary disease and denies prior complications from anesthesia.    History is obtained from the patient and the medical record.     Past Medical History  Past Medical History:   Diagnosis Date     Anemia, iron deficiency      Depression      Esophageal reflux      Hypertension      Obesity        Past Surgical History  Past Surgical History:   Procedure Laterality Date     BIOPSY       ESOPHAGOSCOPY, GASTROSCOPY, DUODENOSCOPY (EGD), COMBINED  2012    Procedure: COMBINED ESOPHAGOSCOPY, GASTROSCOPY, DUODENOSCOPY (EGD);;  Surgeon: Chris Garcia MD;  Location:  GI     ESOPHAGOSCOPY, GASTROSCOPY,  DUODENOSCOPY (EGD), COMBINED N/A 12/31/2014    Procedure: COMBINED ESOPHAGOSCOPY, GASTROSCOPY, DUODENOSCOPY (EGD), BIOPSY SINGLE OR MULTIPLE;  Surgeon: Marco Walter MD;  [facility]     LAPAROSCOPIC BYPASS GASTRIC  5/18/2012    Procedure:LAPAROSCOPIC BYPASS GASTRIC; Laparoscopic Leticia-En-Y Gastric Bypass; Surgeon:MARCO WALTER; [facility]     US BREAST BIOPSY RT         Hx of Blood transfusions/reactions: denies     Hx of abnormal bleeding or anti-platelet use: denies    Menstrual history: No LMP recorded. (Menstrual status: Birth Control).:     Steroid use in the last year: denies    Personal or FH with difficulty with Anesthesia:  denies    Prior to Admission Medications  Current Outpatient Medications   Medication Sig Dispense Refill     medroxyPROGESTERone (DEPO-PROVERA) 150 MG/ML syringe Inject 150 mg into the muscle every 3 months        buPROPion (WELLBUTRIN XL) 150 MG 24 hr tablet Take 150 mg by mouth every morning (Patient not taking: Reported on 7/19/2021)       cyclobenzaprine (FLEXERIL) 10 MG tablet Take 1 tablet (10 mg) by mouth 3 times daily as needed for muscle spasms (Patient not taking: Reported on 7/8/2021) 15 tablet 0     hydrOXYzine (ATARAX) 25 MG tablet Take 50 mg by mouth as needed  (Patient not taking: Reported on 7/19/2021)       lisinopril (ZESTRIL) 5 MG tablet Take 5 mg by mouth (Patient not taking: Reported on 7/19/2021)       metFORMIN (GLUCOPHAGE-XR) 500 MG 24 hr tablet Take 500 mg by mouth 2 times daily (with meals) (Patient not taking: Reported on 7/19/2021)       naltrexone (DEPADE/REVIA) 50 MG tablet Take 1/2 tablet once daily 1-2 hours prior to worst cravings for 1 week, then increase to 1 tablet daily as directed if tolerating (Patient not taking: Reported on 7/8/2021) 30 tablet 1     omeprazole (PRILOSEC OTC) 20 MG EC tablet Take 1 tablet (20 mg) by mouth daily (Patient not taking: Reported on 7/8/2021) 30 tablet 0     omeprazole (PRILOSEC) 40 MG DR capsule  Take 1 capsule (40 mg) by mouth daily (Patient not taking: Reported on 7/8/2021) 30 capsule 3     oxyCODONE (OXY-IR) 5 MG capsule Take 1-2 capsules (5-10 mg) by mouth every 4 hours as needed for severe pain (Patient not taking: Reported on 7/8/2021) 6 capsule 0     polyethylene glycol (MIRALAX) 17 GM/Dose powder Take 17 g (1 capful) by mouth daily (Patient not taking: Reported on 7/8/2021) 527 g 3     SENNA-docusate sodium (SENNA S) 8.6-50 MG tablet Take 1 tablet by mouth At Bedtime (Patient not taking: Reported on 7/8/2021) 30 tablet 0     simethicone (MYLICON) 80 MG chewable tablet Take 1 tablet (80 mg) by mouth 4 times daily (Patient not taking: Reported on 7/8/2021) 40 tablet 1       Allergies  Allergies   Allergen Reactions     Nsaids GI Disturbance and Other (See Comments)     Hx gastric bypass       Social History  Social History     Socioeconomic History     Marital status: Single     Spouse name: Not on file     Number of children: Not on file     Years of education: Not on file     Highest education level: Not on file   Occupational History     Not on file   Tobacco Use     Smoking status: Never Smoker     Smokeless tobacco: Never Used   Substance and Sexual Activity     Alcohol use: No     Drug use: No     Sexual activity: Yes     Partners: Male     Birth control/protection: Condom   Other Topics Concern     Not on file   Social History Narrative     Not on file     Social Determinants of Health     Financial Resource Strain:      Difficulty of Paying Living Expenses:    Food Insecurity:      Worried About Running Out of Food in the Last Year:      Ran Out of Food in the Last Year:    Transportation Needs:      Lack of Transportation (Medical):      Lack of Transportation (Non-Medical):    Physical Activity:      Days of Exercise per Week:      Minutes of Exercise per Session:    Stress:      Feeling of Stress :    Social Connections:      Frequency of Communication with Friends and Family:      Frequency  of Social Gatherings with Friends and Family:      Attends Islam Services:      Active Member of Clubs or Organizations:      Attends Club or Organization Meetings:      Marital Status:    Intimate Partner Violence:      Fear of Current or Ex-Partner:      Emotionally Abused:      Physically Abused:      Sexually Abused:        Family History  Family History   Problem Relation Age of Onset     Hypertension Mother      Diabetes Mother      Deep Vein Thrombosis (DVT) Mother            Anesthesia Evaluation   Pt has had prior anesthetic.     No history of anesthetic complications       ROS/MED HX  ENT/Pulmonary:  - neg pulmonary ROS  (-) tobacco use and asthma   Neurologic:  - neg neurologic ROS     Cardiovascular:     (+) hypertension-----    METS/Exercise Tolerance: >4 METS    Hematologic:     (+) anemia,  (-) history of blood clots and history of blood transfusion   Musculoskeletal:  - neg musculoskeletal ROS     GI/Hepatic:     (+) cholecystitis/cholelithiasis,  (-) GERD   Renal/Genitourinary:  - neg Renal ROS     Endo:     (+) Obesity,     Psychiatric/Substance Use:     (+) psychiatric history anxiety and depression     Infectious Disease:  - neg infectious disease ROS     Malignancy:  - neg malignancy ROS     Other:            The complete review of systems is negative other than noted in the HPI or here.      Physical Exam    Please refer to the physical examination documented by the anesthesiologist in the anesthesia record on the day of surgery    Constitutional: Awake, alert, cooperative, no apparent distress, and appears stated age.  Respiratory: non labored breathing  Neurologic: Awake, alert, oriented to name, place and time.   Neuropsychiatric: Calm, cooperative. Normal affect.     PRIOR LABS/DIAGNOSTIC STUDIES:  All labs and imaging personally reviewed    Component      Latest Ref Rng & Units 6/2/2021   Sodium      133 - 144 mmol/L 139   Potassium      3.4 - 5.3 mmol/L 4.2   Chloride      94 - 109  "mmol/L 105   Carbon Dioxide      20 - 32 mmol/L 28   Anion Gap      3 - 14 mmol/L 6   Glucose      70 - 99 mg/dL 71   Urea Nitrogen      7 - 30 mg/dL 8   Creatinine      0.52 - 1.04 mg/dL 0.66   GFR Estimate      >60 mL/min/1.73:m2 >90   GFR Estimate If Black      >60 mL/min/1.73:m2 >90   Calcium      8.5 - 10.1 mg/dL 9.0   Bilirubin Total      0.2 - 1.3 mg/dL 0.1 (L)   Albumin      3.4 - 5.0 g/dL 2.4 (L)   Protein Total      6.8 - 8.8 g/dL 6.3 (L)   Alkaline Phosphatase      40 - 150 U/L 108   ALT      0 - 50 U/L 16   AST      0 - 45 U/L 17     Component      Latest Ref Rng & Units 6/2/2021   WBC      4.0 - 11.0 10e9/L 15.4 (H)   RBC Count      3.8 - 5.2 10e12/L 3.58 (L)   Hemoglobin      11.7 - 15.7 g/dL 10.5 (L)   Hematocrit      35.0 - 47.0 % 31.6 (L)   MCV      78 - 100 fl 88   MCH      26.5 - 33.0 pg 29.3   MCHC      31.5 - 36.5 g/dL 33.2   RDW      10.0 - 15.0 % 15.6 (H)   Platelet Count      150 - 450 10e9/L 433         Outside records reviewed from: care everywhere    ASSESSMENT and PLAN  \"Ne Ne\" Reed is a 31 year old female scheduled for laparoscopic cholecystectomy on 7/30/21 by Dr. Walter in the treatment of choledocolithiasis.  PAC referral for risk assessment and optimization for anesthesia:    Pre-operative considerations:  1.  Cardiac:  Functional status- METS >4. She can walk >2 blocks and has been walking recently since birth of her baby 5/31/21.  Intermediate risk surgery with 0.9% (RCRI #) risk of major adverse cardiac event.   --denies chest pain, SOB, PIEDRA, palpitations  --h/o HTN, but reports she has not needed antihypertensives since birth of baby    2.  Pulm:  MEGHAN risk: Intermediate  --never smoker    3.  GI:  Risk of PONV score = 3.  If > 2, anti-emetic intervention recommended.  --h/o Leticia-en-Y 2012   --presented to outside ED for RUQ pain and vomiting 5/26, abdominal US shows internal echogenic debris suggestive of sludge and/or small stones.  She was transferred to [place] where " she was induced and delivered.  --above procedure now planned.    4.  Heme:  --iron deficiency anemia, hgb 10.5, 21.  Recheck DOS.    5.  Psych:  --hx of anxiety and depression, no current medications    6.  Other:  -- 21, doing well   --update labs DOS    VTE risk: 1.8% (BMI, FH of VTE)    Patient is optimized and is acceptable candidate for the proposed procedure.  No further diagnostic evaluation is needed.       Melissa Cardenas PA-C  Preoperative Assessment Center  [facility]  Phone: [phone]  Fax: [phone]

## 2021-07-20 NOTE — PATIENT INSTRUCTIONS
Preparing for Your Surgery      Name:  Morgan Mcbride   MRN:  [id number]   :  1990   Today's Date:  2021       Arriving for surgery:  Surgery date:  2021  Arrival time:  5:30 am    Restrictions due to COVID 19:       One visitor is allowed in the Pre Op area. When you go into surgery, one visitor is allowed to wait in the Surgery Waiting Room       (provided there is enough space to social distance).   After surgery- Two visitors are allowed at a time if you have a private room and one visitor is allowed for those in a semi-private room.   Every 4 days the visitor(s) can rotate. During the 4 day period, the visitor(s) must be consistent. No visitors under the age of 18 years old.   Visiting Hours: 8 am - 8:30 pm   No ill visitors.   All visitors must wear face mask.    [facility] parking is available for anyone with mobility limitations or disabilities.  ([facility]  24 hours/ 7 days a week; [facility]  7 am- 3:30 pm, Mon- Fri)    Please come to:     [facility] [address]  [address] [place] [address]       -    Please proceed to [address] on the 3rd floor. [phone]?     - ?If you are in need of directions, wheelchair or escort please stop at the Information Desk in the lobby.      What can I eat or drink?  -  You may eat and drink normally for up to 8 hours before your surgery. (Until 21 at 11 pm)  -  You may have clear liquids until 2 hours before surgery. (Until 5:30 am arrival time )    Examples of clear liquids:  Water  Clear broth  Juices (apple, white grape, white cranberry  and cider) without pulp  Noncarbonated, powder based beverages  (lemonade and Adán-Aid)  Sodas (Sprite, 7-Up, ginger ale and seltzer)  Coffee or tea (without milk or cream)  Gatorade    -  No Alcohol for at least 24 hours before surgery     Which medicines can I take?    Hold Aspirin for 7 days before surgery.   Hold Multivitamins for 7 days  before surgery.  Hold Supplements for 7 days before surgery.  Hold Ibuprofen (Advil, Motrin) for 1 day before surgery--unless otherwise directed by surgeon.  Hold Naproxen (Aleve) for 4 days before surgery.      -  PLEASE TAKE these medications the day of surgery:  NONE      How do I prepare myself?  - Please take 2 showers before surgery using Scrubcare or Hibiclens soap.    Use this soap only from the neck to your toes.     Leave the soap on your skin for one minute--then rinse thoroughly.      You may use your own shampoo and conditioner; no other hair products.   - Please remove all jewelry and body piercings.  - No lotions, deodorants or fragrance.  - No makeup or fingernail polish.   - Bring your ID and insurance card.    -If you use a CPAP Machine please bring it to hospital    - All patients are required to have a Covid-19 test within 4 days of surgery/procedure.      -Patients will be contacted by the [facility] scheduling team within 1 week of surgery to make an appointment.      - Patients may call the Scheduling team at [phone] if they have not been scheduled within 4 days of  surgery.      ALL PATIENTS GOING HOME THE SAME DAY OF SURGERY ARE REQUIRED TO HAVE A RESPONSIBLE ADULT TO DRIVE AND BE IN ATTENDANCE WITH THEM FOR 24 HOURS FOLLOWING SURGERY.      Questions or Concerns:    - For any questions regarding the day of surgery or your hospital stay, please contact the Pre Admission Nursing Office at [phone].       - If you have health changes between today and your surgery please call your surgeon.       For questions after surgery please call your surgeons office.

## 2021-07-22 ENCOUNTER — VIRTUAL VISIT (OUTPATIENT)
Dept: ENDOCRINOLOGY | Facility: CLINIC | Age: 31
End: 2021-07-22
Payer: COMMERCIAL

## 2021-07-22 VITALS — HEIGHT: 61 IN | BODY MASS INDEX: 58.71 KG/M2

## 2021-07-22 DIAGNOSIS — R73.03 PREDIABETES: ICD-10-CM

## 2021-07-22 DIAGNOSIS — Z98.84 GASTRIC BYPASS STATUS FOR OBESITY: ICD-10-CM

## 2021-07-22 DIAGNOSIS — E66.1 CLASS 3 DRUG-INDUCED OBESITY WITH SERIOUS COMORBIDITY AND BODY MASS INDEX (BMI) OF 50.0 TO 59.9 IN ADULT (H): Primary | ICD-10-CM

## 2021-07-22 DIAGNOSIS — E66.813 CLASS 3 DRUG-INDUCED OBESITY WITH SERIOUS COMORBIDITY AND BODY MASS INDEX (BMI) OF 50.0 TO 59.9 IN ADULT (H): Primary | ICD-10-CM

## 2021-07-22 PROCEDURE — 99214 OFFICE O/P EST MOD 30 MIN: CPT | Mod: 95 | Performed by: NURSE PRACTITIONER

## 2021-07-22 RX ORDER — TOPIRAMATE 25 MG/1
TABLET, FILM COATED ORAL
Qty: 90 TABLET | Refills: 1 | Status: SHIPPED | OUTPATIENT
Start: 2021-07-22

## 2021-07-22 RX ORDER — PHENTERMINE HYDROCHLORIDE 15 MG/1
15 CAPSULE ORAL EVERY MORNING
Qty: 30 CAPSULE | Refills: 1 | Status: ON HOLD | OUTPATIENT
Start: 2021-07-22 | End: 2021-07-30

## 2021-07-22 ASSESSMENT — PAIN SCALES - GENERAL: PAINLEVEL: NO PAIN (0)

## 2021-07-22 NOTE — PATIENT INSTRUCTIONS
"It was a pleasure meeting with you today.    Thank you for allowing us the privilege of caring for you. We hope we provided you with the excellent service you deserve.   Please let us know if there is anything else we can do for you so that we can be sure you are leaving completely satisfied with your care experience.    To ensure the quality of our services you may be receiving a patient satisfaction survey from an independent patient satisfaction monitoring company.    The greatest compliment you can give is a \"Likely to Recommend\"      Your visit was with Fatou Maki NP today.     Instructions per today's visit:     MEDICATIONS:        - Start taking phentermine (after surgery on 7/30) and topiramate        - Continue other medications without change  FURTHER TESTING:       - blood pressure check 1 week after starting phentermine   CONSULTATION/REFERRAL to dietitian   FUTURE LABS:       - Schedule a fasting blood draw - bariatric labs   FUTURE APPOINTMENTS:       - Follow-up visit in 6 weeks       To schedule appointments with our team, please call [phone] option #1    Please call during clinic hours Monday through Friday 8:00a - 4:00p if you have questions or you can contact us via [email or web address] at anytime.      Nurses: [phone]  Fax: [phone]  Surgery Scheduler: [phone]    Please call the hospital at [phone] to speak with our on call MDs if you have urgent needs after hours, during weekends, or holidays.    **Please note if you need to go to the Emergency Room for any reason in the first 90 days after surgery it is important to go to the [facility] on the [place] on [facility] off of the [address]    *For patients who are currently enrolled in our program and have not yet had weight loss surgery please note*:    You must be at your required goal weight at the time of your Anesthesia clinic visit as well as the day of surgery or your surgery will be canceled. You " will not be able to obtain a new surgery date until you have met your goal weight.    Lab results will be communicated through My Chart or letter (if My Chart not used). Please call the clinic if you have not received communication after 1 week or if you have any questions.?    Main follow-up parameters for all bariatric patients     Patients who have undergone Bariatric surgery:    1. Follow-up interval during the first year: ~1 week, 1 month, 3 month, 6 month,12 months.  Every 6 months until 5 years; and then annually thereafter.  The goal of follow-up sessions is to ensure that food intake behavior (choice of food and eating speed) and exercise/activity level are set appropriately.    2. Yearly lab tests ordered by our clinic.      3. The bariatric team should be aware and potentially evaluate all adverse gastrointestinal symptoms (dysphagia, abdominal pain, nausea, vomiting, diarrhea, heartburn, reflux, etc), which can be a sign of complication.  The bariatric surgeons perform general surgery procedures in addition to bariatric surgery (laparoscopic cholecystectomy, etc.)    4. Inability to tolerate textured food (chicken, steak, fish, eggs, beans) is NOT normal and may need to be evaluated by endoscopy performed by the bariatric surgeon.    _____________________________________________________________________________________________________________________________    Meal Replacement Products:    Here is the link to our new e-store where you can purchase our meal replacement products    [facility] E-Store  Videoplaza/store    The one week starter kit is a great way to sample a variety of products and see what works for you.    If you want more information about the product go to: [employer]    Free Shipping for orders over $75     Benefits of meal replacements products:    Portion and calorie control  Improved nutrition  Structured eating  Simplified food choices  Avoid  contact with trigger foods  ______________________________________________________________________________________________________________________________    Healthy Lifestyle Support Group   [facility] Weight Management Program  Virtual Support Group Now Available    60 minutes of small group guided discussion, support and resources    Led by Health , Merlyn Mcwilliams    For questions, and to receive the invite information, contact Merlyn at [email or web address]    All our welcome!    One Friday per month, 12:30pm to 1:30pm  SELF COMPASSION: July 31st  CREATING MY WELLNESS VISION: August 28th  MINDFULNESS PRACTICES FOR A CALM BODY & MIND: September 25th  MINDFUL EATING TOOLS: October 30th  HEALTHY& HAPPY HOLIDAYS: November 20th  OPEN FORUM: December 18th  _______________________________________________________________________________________________________________________________    Connections: Bariatric Care support group  Due to the Covid-19 restrictions, we will be doing our support group virtually using Microsoft Teams. You will need to get an invitation to the group from Suresh eHrnandez, Ph.D., LP at [email or web address] and to learn about using Microsoft Teams. The next meeting is on Tuesday, July 14 between 6:30 and 8 PM and there will be no formal speaker.    This group meets the second Tuesday of each month from 6:30 to 8 PM and will be held again at [facility] in the  Education Center (A-B room) when the Covid-19 restrictions are lifted. The group is led by Suresh Hernandez, Ph.D., Licensed Psychologist, [facility] Comprehensive Weight Management Program. There is no cost for group participation and is open to all [facility] (and those external to this program) pre- and post-operative bariatric surgery patients as well as their support system.    _________________________________________________________________________________________________________________________________      Interested in working with a health ?  Health coaches work with you to improve your overall health and wellbeing.  They look at the whole person, and may involve discussion of different areas of life, including, but not limited to the four pillars of health (sleep, exercise, nutrition, and stress management). Discuss with your care team if you would like to start working a health .     Health Coaching-3 Pack:      $99 for three health coaching visits    Visits may be done in person or via phone    Coaching is a partnership between the  and the client; Coaches do not prescribe or diagnose    Coaching helps inspire the client to reach his/her personal goals   __________________________________________________________________________________________________________________________________    [facility] Get Moving Program    Our team of physical therapists is trained to help you understand and take control of your condition. They will perform a thorough evaluation to determine your ability for activity and develop a customized plan to fit your goals and physical ability.  Scheduling: Unsure if the Get Moving program is right for you? Discuss the program with your medical provider or diabetes educator. You can also call us at [phone] to ask questions or schedule an appointment.   [facility] Get Moving Program  ______________________________________________________________________________________________________________________  Bluetooth Scale:    We hope to provide you with high quality telephone and virtual healthcare visits while social distancing for COVID-19 is necessary, as well as in the future when virtual visits may be more convenient for you.     Our technology team made it possible for [employer] scales to send weight measurements to our  electronic medical record. This allows weights from you weighing at home to securely flow into the medical record, which will improve telephone and virtual visits.   Additionally, studies have shown that adults actually lose more weight when their weights are automatically sent to someone else, and also that this process is not stressful for those adults.    Below is a link for purchasing the scale, with a discount code for our patients. You may call your insurance company to see if they will reimburse you for the cost of the scale, as a piece of durable medical equipment. The scales only go up to a weight of 400 pounds. This is an issue and we are working with the developer on increasing this. We found no scales that go over 400lb that have blue-tooth for connecting to [employer]ibi[employer].    Scale to purchase: the [employer] [place] [employer]  Body  Scale: [email or web address].ds    Discount Code: We have a discount code for our patients to bring the cost down to $50, the code is: [place]_Scale_20%off    Steps to link the scale to Wibiya via an Android Phone (you can always disconnect at any point in the future):  1. The order must be placed first before the patient can access Track My Health within Wibiya.  2. Download Google Fit hattie from the Google Play Store   a. Log in or register using your Google account   3. Download the Wibiya hattie from Google Play Store  a. Select add organization   b. Search for [employer] and select it   c. Log into Wibiya  d. Select Track My Health   e. Select the green connect my account button   f. When prompted log into your Google account   g. Select okay to confirm the account   4. Download the [employer] Health Mate hattie from Google Play Store  a. [place] for [employer]   b. Go to profile   c. Tap google fit under the Apps section  d. Select the option to activate Google Fit integration   e. Select the same Google  account   f. Select okay to confirm the account  g.   Steps to link the scale to [facility] via an iPhone (you can always disconnect at any point in the future):  **Note Ensocare is not available for download on an iPad**  1. The order must be placed first before the patient can access Track My Health within [facility].  2. Locate the Health malgorzata on your iPhone.  a. Set up your Apple Health account as prompted  b. The Sources page will show Apps that communicate with your Health malgorzata. Once all steps are completed, you should see Go800 and [facility] listed under the Apps section and your iPhone under the devices section.  i. Select Health Mate  1. Under 'ALLOW  AuditFile  TO WRITE DATA ensure the toggle is on for Weight.  2. This will allow the scale to add your weight to the Apple Health  ii. Select MyChart  1. Under 'ALLOW  xCloud  TO READ DATA ensure the toggle is on for Weight.  2. This allows [facility] to grab the weight from Ensocare so your provider can see your weights.  3. Download the [facility] malgorzata from the Malgorzata Store   a. Select add organization                                                  b. Search for One Touch EMR and select it  c. Log into [facility]  d. Select Track My Health   e. Select the green connect my account button   f. Follow prompts to link your device to [facility].  4. Download the [facility] health mate malgorzata in the Malgorzata Store   a. [place] for [employer]   b. Go to profile   c. [employer] Health under the Apps section  d. If prompted to allow access with the Health Malgorzata, toggle weight on for read and write access.        MEDICATION STARTED AT THIS APPOINTMENT    We are starting Phentermine. Take one tablet in the morning. Contact the nurse via [facility] or call [phone] if you have any questions or concerns. (Do not stop taking it if you don't think it's working. For some people it works without them knowing it.)    Phentermine is being prescribed because you identified hunger as one of the main causes  for your extra weight.      Our patients on Phentermine find that they:    >feel less hunger    >find it easier to push the plate away   >have an easier time eating less    For some of our patients, these feelings are very real and immediate. For other patients, the feelings are less obvious. They don't feel much of a change but find they've lost weight. Like all weight loss medications, Phentermine  works best when you help it work. This means:  1. Having less tempting high calorie (fattening) food around the house or office. (For people with strong cravings this is very important.)   2. Staying away from situations or people that may trigger your cravings .   3. Eating out only one time or less each week.  4. Eating your meals at a table with the TV or computer off.    Side-effects. Phentermine is generally well tolerated. The main side-effects we see are feelings of racing pulse or rapid heart beat. Some people can get an elevated blood pressure. Because of this we may have you come back within a week or so of starting the medication for a blood pressure check.         In order to get refills of this or any medication we prescribe you must be seen in the medical weight mgmt clinic every 2-3 months.      MEDICATION STARTED AT THIS APPOINTMENT  We are starting topiramate at bedtime.  Start one tab, 25 mg, for a week. Go up to 50 mg (2 tabs) for the next week. At the third week, take   3 tabs (75 mg).  Stay at 3 tabs until you are seen again. Contact the nurse via Siemens or call [phone] if you have any questions or concerns. (Do not stop taking it if you don't think it's working. For some people it works even though they do not feel much different.)    Topiramate (Topamax) is a medication that is used most often to treat migraine headaches or for seizures. It has also been found to help with weight loss. Although it's not currently FDA approved for weight loss, it has been used safely for a number of years to  help people who are carrying extra weight.     Just how topiramate helps with weight loss has not been exactly determined. However it seems to work on areas of the brain to quiet down signals related to eating.      Topiramate may make you:    >feel less interest in eating in between meals   >think less about food and eating   >find it easier to push the plate away   >find giving up pop easier    >have an easier time eating less    For some of our patients, the pills work right away. They feel and think quite differently about food. Other patients don't feel much of a change but find in fact they have lost weight! Like all weight loss medications, topiramate works best when you help it work.  This means:    1) Have less tempting high calorie (fattening) food around the house or office    2) Have lower calorie food (fruits, vegetables,low fat meats and dairy) for snacks    3) Eat out only one time or less each week.   4) Eat your meals at a table with the TV or computer off.    Side-effects. Topiramate is generally well tolerated. The main side-effects we see are:   Tingling in hands,feet, or face (usually not very troublesome)   Mental confusion and word finding trouble (about 10% of patients have this.)     Feeling sleepy or a bit dopey- this goes away very soon after starting.    One of the dangers of topiramate is the possibility of birth defects--if you get pregnant when you are on it, there is the risk that your baby will be born with a cleft lip or palate.  If you are on topiramate and of child bearing age, you need to be on a reliable form of birth control or refrain from sexual intercourse.     Please refer to the pharmacy insert for more information on side-effects. Since many pharmacists are not familiar with the use of topiramate in weight loss, calling the clinic will get you the most accurate information on the use of this medication for weight loss.     In order to get refills of this or any medication  we prescribe you must be seen in the medical weight mgmt clinic every 2-3 months.

## 2021-07-22 NOTE — PROGRESS NOTES
"Return Bariatric Surgery Note    RE: Morgan Mcbride  MR#: [id number]  : 1990  VISIT DATE: 2021      Dear Antoni Bonilla,    I had the pleasure of seeing your patient, Morgan Mcbride, in my post-bariatric surgery assessment clinic.    Assessment & Plan   Problem List Items Addressed This Visit        Digestive    Gastric bypass status for obesity    Relevant Medications    phentermine (ADIPEX-P) 15 MG capsule    Other Relevant Orders    CBC with platelets    Comprehensive metabolic panel    Hemoglobin A1c    Lipid panel reflex to direct LDL Fasting    Vitamin D Deficiency    Parathyroid Hormone Intact    Ferritin    Class 3 drug-induced obesity with serious comorbidity and body mass index (BMI) of 50.0 to 59.9 in adult (H) - Primary     9 years s/p RNY with weight regain. Has been doing mwm on and off over the years. Most recently was prescribed contrave but never started because she found out she was pregnant. Now, 2 months post partum. Scheduled for venice barrientos with Dr. Walter .     Would like to discuss \"new\" medication for weight loss. AOM limited by insurance. Will restart phentermine and topiramate as she tolerated before and were at one point effective. Will obtain bariatric labs. Could consider GLP1 if DMII or Prediabetes. No hx of DMII. Patient will wait to start phentermine until after surgery. Patient to get blood pressure check 1-2 weeks after staring phentermine.          Relevant Medications    phentermine (ADIPEX-P) 15 MG capsule    topiramate (TOPAMAX) 25 MG tablet    Other Relevant Orders    CBC with platelets    Comprehensive metabolic panel    Hemoglobin A1c    Lipid panel reflex to direct LDL Fasting    Vitamin D Deficiency    Parathyroid Hormone Intact    Ferritin       Endocrine    Prediabetes           Review of external notes as documented above     38 minutes spent on the date of the encounter doing chart review, history and exam, documentation and further " activities per the note  [id number]}    MEDICATIONS:        - Start taking phentermine (after surgery on 7/30) and topiramate        - Continue other medications without change  FURTHER TESTING:       - blood pressure check 1 week after starting phentermine   CONSULTATION/REFERRAL to dietitian   FUTURE LABS:       - Schedule a fasting blood draw - bariatric labs   FUTURE APPOINTMENTS:       - Follow-up visit in 6 weeks       Morgan Mcbride is a 31 year old female who presents to clinic today for the following health issues       CHIEF COMPLAINT: Post-bariatric surgery follow-up    HISTORY OF PRESENT ILLNESS:  No flowsheet data found.  RNY 2012  MWM since 2012 with multiple providers  Last seen with Jonelle 12/2020- reviewed her note for hx. Wanted to discuss revision, I had seen her previously and discussed recommendation for MWM instead of surgery due to risk/ benefit Jonelle had same conversation. Felt phentermine and topiramate not beneficial 2 years earlier. Jonelle prescribed bupropion and naltrexone. GERD/ nausea was well controlled with omeprazole 40mg bid.     Notes phentermine when taken with naltrexone is initially helpful but then gets used to it    Never took bupropion and naltrexone     Today:   -Bupropion and naltrexone stopped due to pregnancy 1/2021- delivered 5/30/2021   -scheduled for gallbladder remoal 7/30/2021 with Dr. Walter   -2 months post partum, not breast feeding, depo for birth control  -hx of prediabetes, previously on metformin, no recent A1C, no hx gestational diabetes   -high weight with pregnancy 312, low after surgery was 215     Weight History:  No flowsheet data found.  Initial Weight (lbs): 310 lbs  Weight:  (unknown)  Last Visits Weight: 140.6 kg (310 lb)          No flowsheet data found.    Eating Habits 12/12/2020   How many meals do you eat per day? 2   Do you snack between meals? No       Exercise Questions Reviewed With Patient 12/12/2020   How often do you exercise? 3 to 4  times per week   What is the duration of your exercise (in minutes)? 60+ Minutes   What types of exercise do you do? walking, gym membership   What keeps you from being more active?  I am as active as I can possbily be         Social History:  No flowsheet data found.    Medications:  Current Outpatient Medications   Medication     medroxyPROGESTERone (DEPO-PROVERA) 150 MG/ML syringe     phentermine (ADIPEX-P) 15 MG capsule     topiramate (TOPAMAX) 25 MG tablet     No current facility-administered medications for this visit.     No flowsheet data found.    ROS:  GI: No flowsheet data found.  Skin: No flowsheet data found.  Psych: No flowsheet data found.  Female Only:   BAR RBS ROS - FEMALE ONLY 12/12/2020   Female only: None of the above       Admission on 05/30/2021, Discharged on 06/03/2021   Component Date Value Ref Range Status     Sodium 05/30/2021 138  133 - 144 mmol/L Final     Potassium 05/30/2021 4.0  3.4 - 5.3 mmol/L Final     Chloride 05/30/2021 107  94 - 109 mmol/L Final     Carbon Dioxide 05/30/2021 22  20 - 32 mmol/L Final     Anion Gap 05/30/2021 9  3 - 14 mmol/L Final     Glucose 05/30/2021 91  70 - 99 mg/dL Final     Urea Nitrogen 05/30/2021 5* 7 - 30 mg/dL Final     Creatinine 05/30/2021 0.62  0.52 - 1.04 mg/dL Final     GFR Estimate 05/30/2021 >90  >60 mL/min/[1.73_m2] Final    Comment: Non  GFR Calc  Starting 12/18/2018, serum creatinine based estimated GFR (eGFR) will be   calculated using the Chronic Kidney Disease Epidemiology Collaboration   (CKD-EPI) equation.       GFR Estimate If Black 05/30/2021 >90  >60 mL/min/[1.73_m2] Final    Comment:  GFR Calc  Starting 12/18/2018, serum creatinine based estimated GFR (eGFR) will be   calculated using the Chronic Kidney Disease Epidemiology Collaboration   (CKD-EPI) equation.       Calcium 05/30/2021 8.9  8.5 - 10.1 mg/dL Final     Bilirubin Total 05/30/2021 0.2  0.2 - 1.3 mg/dL Final     Albumin 05/30/2021 2.7* 3.4 -  5.0 g/dL Final     Protein Total 05/30/2021 6.9  6.8 - 8.8 g/dL Final     Alkaline Phosphatase 05/30/2021 125  40 - 150 U/L Final     ALT 05/30/2021 14  0 - 50 U/L Final     AST 05/30/2021 17  0 - 45 U/L Final     WBC 05/30/2021 20.4* 4.0 - 11.0 10e9/L Final     RBC Count 05/30/2021 3.44* 3.8 - 5.2 10e12/L Final     Hemoglobin 05/30/2021 9.8* 11.7 - 15.7 g/dL Final     Hematocrit 05/30/2021 30.2* 35.0 - 47.0 % Final     MCV 05/30/2021 88  78 - 100 fl Final     MCH 05/30/2021 28.5  26.5 - 33.0 pg Final     MCHC 05/30/2021 32.5  31.5 - 36.5 g/dL Final     RDW 05/30/2021 14.7  10.0 - 15.0 % Final     Platelet Count 05/30/2021 414  150 - 450 10e9/L Final     Diff Method 05/30/2021 Automated Method   Final     % Neutrophils 05/30/2021 71.8  % Final     % Lymphocytes 05/30/2021 14.5  % Final     % Monocytes 05/30/2021 9.9  % Final     % Eosinophils 05/30/2021 0.3  % Final     % Basophils 05/30/2021 0.3  % Final     % Immature Granulocytes 05/30/2021 3.2  % Final     Nucleated RBCs 05/30/2021 1* 0 /100 Final     Absolute Neutrophil 05/30/2021 14.6* 1.6 - 8.3 10e9/L Final     Absolute Lymphocytes 05/30/2021 3.0  0.8 - 5.3 10e9/L Final     Absolute Monocytes 05/30/2021 2.0* 0.0 - 1.3 10e9/L Final     Absolute Eosinophils 05/30/2021 0.1  0.0 - 0.7 10e9/L Final     Absolute Basophils 05/30/2021 0.1  0.0 - 0.2 10e9/L Final     Abs Immature Granulocytes 05/30/2021 0.7* 0 - 0.4 10e9/L Final     Absolute Nucleated RBC 05/30/2021 0.2   Final     ABO 05/30/2021 O   Final     RH(D) 05/30/2021 Pos   Final     Antibody Screen 05/30/2021 Neg   Final     Test Valid Only At 05/30/2021 [facility]      Final     Specimen Expires 05/30/2021 06/02/2021   Final     Protein Random Urine 05/30/2021 0.10  g/L Final     Protein Total Urine g/gr Creatinine 05/30/2021 0.21* 0 - 0.2 g/g Cr Final     Creatinine Urine 05/30/2021 46  mg/dL Final     Treponema Antibodies 05/30/2021 Nonreactive  NR^Nonreactive  Final    Comment: Methodology Change: Test performed on the [employer] Liaison XL by Treponema   pallidum Total Antibodies Assay as of 3.17.2020.       SARS-CoV-2 Virus Specimen Source 05/30/2021 Nasopharyngeal   Final     SARS-CoV-2 PCR Result 05/30/2021 NEGATIVE   Final    SARS-CoV2 (COVID-19) RNA not detected, presumed negative.     SARS-CoV-2 PCR Comment 05/30/2021 (Note)   Final    Comment: Testing was performed using the jackie SARS-CoV-2 & Influenza A/B Assay on the   jackie Grace System.  This test should be ordered for the detection of SARS-COV-2 in individuals who   meet SARS-CoV-2 clinical and/or epidemiological criteria. Test performance is   unknown in asymptomatic patients.  This test is for in vitro diagnostic use under the FDA EUA for laboratories   certified under CLIA to perform moderate and/or high complexity testing. This   test has not been FDA cleared or approved.  A negative test does not rule out the presence of PCR inhibitors in the   specimen or target RNA in concentration below the limit of detection for the   assay. The possibility of a false negative should be considered if the   patient's recent exposure or clinical presentation suggests COVID-19.  [facility] are certified under the Clinical Laboratory   Improvement Amendments of 1988 (CLIA-88) as qualified to perform moderate   and/or high complexity laboratory testing.       Hemoglobin 06/01/2021 9.8* 11.7 - 15.7 g/dL Final     Platelet Count 06/01/2021 432  150 - 450 10e9/L Final     WBC 06/02/2021 15.4* 4.0 - 11.0 10e9/L Final     RBC Count 06/02/2021 3.58* 3.8 - 5.2 10e12/L Final     Hemoglobin 06/02/2021 10.5* 11.7 - 15.7 g/dL Final     Hematocrit 06/02/2021 31.6* 35.0 - 47.0 % Final     MCV 06/02/2021 88  78 - 100 fl Final     MCH 06/02/2021 29.3  26.5 - 33.0 pg Final     MCHC 06/02/2021 33.2  31.5 - 36.5 g/dL Final     RDW 06/02/2021 15.6* 10.0 - 15.0 % Final     Platelet Count 06/02/2021 433  150 - 450 10e9/L  "Final     Lipase 06/02/2021 118  73 - 393 U/L Final     Amylase 06/02/2021 60  30 - 110 U/L Final     Sodium 06/02/2021 139  133 - 144 mmol/L Final     Potassium 06/02/2021 4.2  3.4 - 5.3 mmol/L Final     Chloride 06/02/2021 105  94 - 109 mmol/L Final     Carbon Dioxide 06/02/2021 28  20 - 32 mmol/L Final     Anion Gap 06/02/2021 6  3 - 14 mmol/L Final     Glucose 06/02/2021 71  70 - 99 mg/dL Final     Urea Nitrogen 06/02/2021 8  7 - 30 mg/dL Final     Creatinine 06/02/2021 0.66  0.52 - 1.04 mg/dL Final     GFR Estimate 06/02/2021 >90  >60 mL/min/[1.73_m2] Final    Comment: Non  GFR Calc  Starting 12/18/2018, serum creatinine based estimated GFR (eGFR) will be   calculated using the Chronic Kidney Disease Epidemiology Collaboration   (CKD-EPI) equation.       GFR Estimate If Black 06/02/2021 >90  >60 mL/min/[1.73_m2] Final    Comment:  GFR Calc  Starting 12/18/2018, serum creatinine based estimated GFR (eGFR) will be   calculated using the Chronic Kidney Disease Epidemiology Collaboration   (CKD-EPI) equation.       Calcium 06/02/2021 9.0  8.5 - 10.1 mg/dL Final     Bilirubin Total 06/02/2021 0.1* 0.2 - 1.3 mg/dL Final     Albumin 06/02/2021 2.4* 3.4 - 5.0 g/dL Final     Protein Total 06/02/2021 6.3* 6.8 - 8.8 g/dL Final     Alkaline Phosphatase 06/02/2021 108  40 - 150 U/L Final     ALT 06/02/2021 16  0 - 50 U/L Final     AST 06/02/2021 17  0 - 45 U/L Final     Bilirubin Direct 06/02/2021 <0.1  0.0 - 0.2 mg/dL Final     Platelet Count 06/03/2021 441  150 - 450 10e9/L Final         PHYSICAL EXAM:  Objective    Ht 1.549 m (5' 1\")   BMI 58.71 kg/m    Vitals - Patient Reported  Pain Score: No Pain (0)      Vitals:  No vitals were obtained today due to virtual visit.    Physical Exam   GENERAL: Healthy, alert and no distress  EYES: Eyes grossly normal to inspection.  No discharge or erythema, or obvious scleral/conjunctival abnormalities.  RESP: No audible wheeze, cough, or visible " cyanosis.  No visible retractions or increased work of breathing.    SKIN: Visible skin clear. No significant rash, abnormal pigmentation or lesions.  NEURO: Cranial nerves grossly intact.  Mentation and speech appropriate for age.  PSYCH: Mentation appears normal, affect normal/bright, judgement and insight intact, normal speech and appearance well-groomed.        Sincerely,    Fatou Maki, NP

## 2021-07-22 NOTE — PROGRESS NOTES
Ne Ne is a 31 year old who is being evaluated via a billable video visit.      How would you like to obtain your AVS? MyChart  If the video visit is dropped, the invitation should be resent by: Text to cell phone: [phone]  Will anyone else be joining your video visit? No      Video Start Time: 0851  Video-Visit Details    Type of service:  Video Visit    Video End Time:0912    Originating Location (pt. Location): Home    Distant Location (provider location):  [facility] [place]     Platform used for Video Visit: [employer]

## 2021-07-22 NOTE — LETTER
"2021       RE: Morgan Mcbride  [address] [place] [address]     Dear Colleague,    Thank you for referring your patient, Morgan Mcbride, to the [facility] [place] at [facility]. Please see a copy of my visit note below.    Return Bariatric Surgery Note    RE: Morgan Mcbride  MR#: [id number]  : 1990  VISIT DATE: 2021      Dear Antoni Bonilla,    I had the pleasure of seeing your patient, Morgan Mcbride, in my post-bariatric surgery assessment clinic.    Assessment & Plan   Problem List Items Addressed This Visit        Digestive    Gastric bypass status for obesity    Relevant Medications    phentermine (ADIPEX-P) 15 MG capsule    Other Relevant Orders    CBC with platelets    Comprehensive metabolic panel    Hemoglobin A1c    Lipid panel reflex to direct LDL Fasting    Vitamin D Deficiency    Parathyroid Hormone Intact    Ferritin    Class 3 drug-induced obesity with serious comorbidity and body mass index (BMI) of 50.0 to 59.9 in adult (H) - Primary     9 years s/p RNY with weight regain. Has been doing mwm on and off over the years. Most recently was prescribed contrave but never started because she found out she was pregnant. Now, 2 months post partum. Scheduled for venice barrientos with Dr. Walter .     Would like to discuss \"new\" medication for weight loss. AOM limited by insurance. Will restart phentermine and topiramate as she tolerated before and were at one point effective. Will obtain bariatric labs. Could consider GLP1 if DMII or Prediabetes. No hx of DMII. Patient will wait to start phentermine until after surgery. Patient to get blood pressure check 1-2 weeks after staring phentermine.          Relevant Medications    phentermine (ADIPEX-P) 15 MG capsule    topiramate (TOPAMAX) 25 MG tablet    Other Relevant Orders    CBC with platelets    Comprehensive " metabolic panel    Hemoglobin A1c    Lipid panel reflex to direct LDL Fasting    Vitamin D Deficiency    Parathyroid Hormone Intact    Ferritin       Endocrine    Prediabetes           Review of external notes as documented above     38 minutes spent on the date of the encounter doing chart review, history and exam, documentation and further activities per the note  [id number]}    MEDICATIONS:        - Start taking phentermine (after surgery on 7/30) and topiramate        - Continue other medications without change  FURTHER TESTING:       - blood pressure check 1 week after starting phentermine   CONSULTATION/REFERRAL to dietitian   FUTURE LABS:       - Schedule a fasting blood draw - bariatric labs   FUTURE APPOINTMENTS:       - Follow-up visit in 6 weeks       Morgan Mcbride is a 31 year old female who presents to clinic today for the following health issues       CHIEF COMPLAINT: Post-bariatric surgery follow-up    HISTORY OF PRESENT ILLNESS:  No flowsheet data found.  RNY 2012  MWM since 2012 with multiple providers  Last seen with Jonelle 12/2020- reviewed her note for hx. Wanted to discuss revision, I had seen her previously and discussed recommendation for MWM instead of surgery due to risk/ benefit Jonelle had same conversation. Felt phentermine and topiramate not beneficial 2 years earlier. Jonelle prescribed bupropion and naltrexone. GERD/ nausea was well controlled with omeprazole 40mg bid.     Notes phentermine when taken with naltrexone is initially helpful but then gets used to it    Never took bupropion and naltrexone     Today:   -Bupropion and naltrexone stopped due to pregnancy 1/2021- delivered 5/30/2021   -scheduled for gallbladder remoal 7/30/2021 with Dr. Walter   -2 months post partum, not breast feeding, depo for birth control  -hx of prediabetes, previously on metformin, no recent A1C, no hx gestational diabetes   -high weight with pregnancy 312, low after surgery was 215     Weight  History:  No flowsheet data found.  Initial Weight (lbs): 310 lbs  Weight:  (unknown)  Last Visits Weight: 140.6 kg (310 lb)          No flowsheet data found.    Eating Habits 12/12/2020   How many meals do you eat per day? 2   Do you snack between meals? No       Exercise Questions Reviewed With Patient 12/12/2020   How often do you exercise? 3 to 4 times per week   What is the duration of your exercise (in minutes)? 60+ Minutes   What types of exercise do you do? walking, gym membership   What keeps you from being more active?  I am as active as I can possbily be         Social History:  No flowsheet data found.    Medications:  Current Outpatient Medications   Medication     medroxyPROGESTERone (DEPO-PROVERA) 150 MG/ML syringe     phentermine (ADIPEX-P) 15 MG capsule     topiramate (TOPAMAX) 25 MG tablet     No current facility-administered medications for this visit.     No flowsheet data found.    ROS:  GI: No flowsheet data found.  Skin: No flowsheet data found.  Psych: No flowsheet data found.  Female Only:   BAR RBS ROS - FEMALE ONLY 12/12/2020   Female only: None of the above       Admission on 05/30/2021, Discharged on 06/03/2021   Component Date Value Ref Range Status     Sodium 05/30/2021 138  133 - 144 mmol/L Final     Potassium 05/30/2021 4.0  3.4 - 5.3 mmol/L Final     Chloride 05/30/2021 107  94 - 109 mmol/L Final     Carbon Dioxide 05/30/2021 22  20 - 32 mmol/L Final     Anion Gap 05/30/2021 9  3 - 14 mmol/L Final     Glucose 05/30/2021 91  70 - 99 mg/dL Final     Urea Nitrogen 05/30/2021 5* 7 - 30 mg/dL Final     Creatinine 05/30/2021 0.62  0.52 - 1.04 mg/dL Final     GFR Estimate 05/30/2021 >90  >60 mL/min/[1.73_m2] Final    Comment: Non  GFR Calc  Starting 12/18/2018, serum creatinine based estimated GFR (eGFR) will be   calculated using the Chronic Kidney Disease Epidemiology Collaboration   (CKD-EPI) equation.       GFR Estimate If Black 05/30/2021 >90  >60 mL/min/[1.73_m2]  Final    Comment:  GFR Calc  Starting 12/18/2018, serum creatinine based estimated GFR (eGFR) will be   calculated using the Chronic Kidney Disease Epidemiology Collaboration   (CKD-EPI) equation.       Calcium 05/30/2021 8.9  8.5 - 10.1 mg/dL Final     Bilirubin Total 05/30/2021 0.2  0.2 - 1.3 mg/dL Final     Albumin 05/30/2021 2.7* 3.4 - 5.0 g/dL Final     Protein Total 05/30/2021 6.9  6.8 - 8.8 g/dL Final     Alkaline Phosphatase 05/30/2021 125  40 - 150 U/L Final     ALT 05/30/2021 14  0 - 50 U/L Final     AST 05/30/2021 17  0 - 45 U/L Final     WBC 05/30/2021 20.4* 4.0 - 11.0 10e9/L Final     RBC Count 05/30/2021 3.44* 3.8 - 5.2 10e12/L Final     Hemoglobin 05/30/2021 9.8* 11.7 - 15.7 g/dL Final     Hematocrit 05/30/2021 30.2* 35.0 - 47.0 % Final     MCV 05/30/2021 88  78 - 100 fl Final     MCH 05/30/2021 28.5  26.5 - 33.0 pg Final     MCHC 05/30/2021 32.5  31.5 - 36.5 g/dL Final     RDW 05/30/2021 14.7  10.0 - 15.0 % Final     Platelet Count 05/30/2021 414  150 - 450 10e9/L Final     Diff Method 05/30/2021 Automated Method   Final     % Neutrophils 05/30/2021 71.8  % Final     % Lymphocytes 05/30/2021 14.5  % Final     % Monocytes 05/30/2021 9.9  % Final     % Eosinophils 05/30/2021 0.3  % Final     % Basophils 05/30/2021 0.3  % Final     % Immature Granulocytes 05/30/2021 3.2  % Final     Nucleated RBCs 05/30/2021 1* 0 /100 Final     Absolute Neutrophil 05/30/2021 14.6* 1.6 - 8.3 10e9/L Final     Absolute Lymphocytes 05/30/2021 3.0  0.8 - 5.3 10e9/L Final     Absolute Monocytes 05/30/2021 2.0* 0.0 - 1.3 10e9/L Final     Absolute Eosinophils 05/30/2021 0.1  0.0 - 0.7 10e9/L Final     Absolute Basophils 05/30/2021 0.1  0.0 - 0.2 10e9/L Final     Abs Immature Granulocytes 05/30/2021 0.7* 0 - 0.4 10e9/L Final     Absolute Nucleated RBC 05/30/2021 0.2   Final     ABO 05/30/2021 O   Final     RH(D) 05/30/2021 Pos   Final     Antibody Screen 05/30/2021 Neg   Final     Test Valid Only At 05/30/2021  [facility]      Final     Specimen Expires 05/30/2021 06/02/2021   Final     Protein Random Urine 05/30/2021 0.10  g/L Final     Protein Total Urine g/gr Creatinine 05/30/2021 0.21* 0 - 0.2 g/g Cr Final     Creatinine Urine 05/30/2021 46  mg/dL Final     Treponema Antibodies 05/30/2021 Nonreactive  NR^Nonreactive Final    Comment: Methodology Change: Test performed on the [employer] software Liaison XL by Treponema   pallidum Total Antibodies Assay as of 3.17.2020.       SARS-CoV-2 Virus Specimen Source 05/30/2021 Nasopharyngeal   Final     SARS-CoV-2 PCR Result 05/30/2021 NEGATIVE   Final    SARS-CoV2 (COVID-19) RNA not detected, presumed negative.     SARS-CoV-2 PCR Comment 05/30/2021 (Note)   Final    Comment: Testing was performed using the jackie SARS-CoV-2 & Influenza A/B Assay on the   jackie Grace System.  This test should be ordered for the detection of SARS-COV-2 in individuals who   meet SARS-CoV-2 clinical and/or epidemiological criteria. Test performance is   unknown in asymptomatic patients.  This test is for in vitro diagnostic use under the FDA EUA for laboratories   certified under CLIA to perform moderate and/or high complexity testing. This   test has not been FDA cleared or approved.  A negative test does not rule out the presence of PCR inhibitors in the   specimen or target RNA in concentration below the limit of detection for the   assay. The possibility of a false negative should be considered if the   patient's recent exposure or clinical presentation suggests COVID-19.  [facility] are certified under the Clinical Laboratory   Improvement Amendments of 1988 (CLIA-88) as qualified to perform moderate   and/or high complexity laboratory testing.       Hemoglobin 06/01/2021 9.8* 11.7 - 15.7 g/dL Final     Platelet Count 06/01/2021 432  150 - 450 10e9/L Final     WBC 06/02/2021 15.4* 4.0 - 11.0 10e9/L Final     RBC Count 06/02/2021 3.58* 3.8 -  "5.2 10e12/L Final     Hemoglobin 06/02/2021 10.5* 11.7 - 15.7 g/dL Final     Hematocrit 06/02/2021 31.6* 35.0 - 47.0 % Final     MCV 06/02/2021 88  78 - 100 fl Final     MCH 06/02/2021 29.3  26.5 - 33.0 pg Final     MCHC 06/02/2021 33.2  31.5 - 36.5 g/dL Final     RDW 06/02/2021 15.6* 10.0 - 15.0 % Final     Platelet Count 06/02/2021 433  150 - 450 10e9/L Final     Lipase 06/02/2021 118  73 - 393 U/L Final     Amylase 06/02/2021 60  30 - 110 U/L Final     Sodium 06/02/2021 139  133 - 144 mmol/L Final     Potassium 06/02/2021 4.2  3.4 - 5.3 mmol/L Final     Chloride 06/02/2021 105  94 - 109 mmol/L Final     Carbon Dioxide 06/02/2021 28  20 - 32 mmol/L Final     Anion Gap 06/02/2021 6  3 - 14 mmol/L Final     Glucose 06/02/2021 71  70 - 99 mg/dL Final     Urea Nitrogen 06/02/2021 8  7 - 30 mg/dL Final     Creatinine 06/02/2021 0.66  0.52 - 1.04 mg/dL Final     GFR Estimate 06/02/2021 >90  >60 mL/min/[1.73_m2] Final    Comment: Non  GFR Calc  Starting 12/18/2018, serum creatinine based estimated GFR (eGFR) will be   calculated using the Chronic Kidney Disease Epidemiology Collaboration   (CKD-EPI) equation.       GFR Estimate If Black 06/02/2021 >90  >60 mL/min/[1.73_m2] Final    Comment:  GFR Calc  Starting 12/18/2018, serum creatinine based estimated GFR (eGFR) will be   calculated using the Chronic Kidney Disease Epidemiology Collaboration   (CKD-EPI) equation.       Calcium 06/02/2021 9.0  8.5 - 10.1 mg/dL Final     Bilirubin Total 06/02/2021 0.1* 0.2 - 1.3 mg/dL Final     Albumin 06/02/2021 2.4* 3.4 - 5.0 g/dL Final     Protein Total 06/02/2021 6.3* 6.8 - 8.8 g/dL Final     Alkaline Phosphatase 06/02/2021 108  40 - 150 U/L Final     ALT 06/02/2021 16  0 - 50 U/L Final     AST 06/02/2021 17  0 - 45 U/L Final     Bilirubin Direct 06/02/2021 <0.1  0.0 - 0.2 mg/dL Final     Platelet Count 06/03/2021 441  150 - 450 10e9/L Final         PHYSICAL EXAM:  Objective    Ht 1.549 m (5' 1\")   " BMI 58.71 kg/m    Vitals - Patient Reported  Pain Score: No Pain (0)      Vitals:  No vitals were obtained today due to virtual visit.    Physical Exam   GENERAL: Healthy, alert and no distress  EYES: Eyes grossly normal to inspection.  No discharge or erythema, or obvious scleral/conjunctival abnormalities.  RESP: No audible wheeze, cough, or visible cyanosis.  No visible retractions or increased work of breathing.    SKIN: Visible skin clear. No significant rash, abnormal pigmentation or lesions.  NEURO: Cranial nerves grossly intact.  Mentation and speech appropriate for age.  PSYCH: Mentation appears normal, affect normal/bright, judgement and insight intact, normal speech and appearance well-groomed.        Sincerely,    FAIZA Patiño Ne is a 31 year old who is being evaluated via a billable video visit.      How would you like to obtain your AVS? MyChart  If the video visit is dropped, the invitation should be resent by: Text to cell phone: [phone]  Will anyone else be joining your video visit? No      Video Start Time: 0851  Video-Visit Details    Type of service:  Video Visit    Video End Time:0912    Originating Location (pt. Location): Home    Distant Location (provider location):  [facility] [place]     Platform used for Video Visit: [employer]

## 2021-07-22 NOTE — NURSING NOTE
"Chief Complaint   Patient presents with     RECHECK     Discuss meds.        Vitals:    07/22/21 0844   Height: 1.549 m (5' 1\")       Body mass index is 58.71 kg/m .                          Niharika Mercer, EMT  "

## 2021-07-23 NOTE — ASSESSMENT & PLAN NOTE
"9 years s/p RNY with weight regain. Has been doing mwm on and off over the years. Most recently was prescribed contrave but never started because she found out she was pregnant. Now, 2 months post partum. Scheduled for venice barrientos with Dr. Walter 7/30.     Would like to discuss \"new\" medication for weight loss. AOM limited by insurance. Will restart phentermine and topiramate as she tolerated before and were at one point effective. Will obtain bariatric labs. Could consider GLP1 if DMII or Prediabetes. No hx of DMII. Patient will wait to start phentermine until after surgery. Patient to get blood pressure check 1-2 weeks after staring phentermine.   "

## 2021-07-26 ENCOUNTER — LAB (OUTPATIENT)
Dept: URGENT CARE | Facility: URGENT CARE | Age: 31
End: 2021-07-26
Attending: SURGERY
Payer: COMMERCIAL

## 2021-07-26 DIAGNOSIS — Z11.59 ENCOUNTER FOR SCREENING FOR OTHER VIRAL DISEASES: ICD-10-CM

## 2021-07-26 LAB — SARS-COV-2 RNA RESP QL NAA+PROBE: NEGATIVE

## 2021-07-26 PROCEDURE — U0005 INFEC AGEN DETEC AMPLI PROBE: HCPCS

## 2021-07-26 PROCEDURE — U0003 INFECTIOUS AGENT DETECTION BY NUCLEIC ACID (DNA OR RNA); SEVERE ACUTE RESPIRATORY SYNDROME CORONAVIRUS 2 (SARS-COV-2) (CORONAVIRUS DISEASE [COVID-19]), AMPLIFIED PROBE TECHNIQUE, MAKING USE OF HIGH THROUGHPUT TECHNOLOGIES AS DESCRIBED BY CMS-2020-01-R: HCPCS

## 2021-07-27 ENCOUNTER — TELEPHONE (OUTPATIENT)
Dept: ENDOCRINOLOGY | Facility: CLINIC | Age: 31
End: 2021-07-27

## 2021-07-27 NOTE — TELEPHONE ENCOUNTER
Pt states she picked up prescription yesterday for Phentermine and it was the wrong dose.   [facility], [phone]

## 2021-07-27 NOTE — TELEPHONE ENCOUNTER
"Called and spoke to patient in regards to Phentermine dose. Patient states she was previously taking 37.5mg tablet. Advised patient that Fatou prescribed 15mg cap to restart and can increase in the future if needing more support. Patient argued that 15mg will not \"do anything for weight loss\" and she wants a new prescription at higher dose. Patient also concerned that she had a tablet last time and current script is for a capsule. Advised that Fatou Maki is out of the office until Thursday this week. Patient has surgery scheduled with Dr. Walter on Friday and wouldn't be starting prescription until next week. Will route to Fatou Maki to advise.   "

## 2021-07-29 ENCOUNTER — ANESTHESIA EVENT (OUTPATIENT)
Dept: SURGERY | Facility: CLINIC | Age: 31
End: 2021-07-29
Payer: COMMERCIAL

## 2021-07-29 ASSESSMENT — LIFESTYLE VARIABLES: TOBACCO_USE: 0

## 2021-07-29 NOTE — ANESTHESIA PREPROCEDURE EVALUATION
Anesthesia Pre-Procedure Evaluation    Patient: Morgan Mcbride   MRN: [id number] : 1990        Preoperative Diagnosis: Choledocholithiasis [K80.50]   Procedure : Procedure(s):  CHOLECYSTECTOMY, LAPAROSCOPIC     Past Medical History:   Diagnosis Date     Anemia, iron deficiency      Depression      Esophageal reflux      Hypertension      Obesity       Past Surgical History:   Procedure Laterality Date     BIOPSY       ESOPHAGOSCOPY, GASTROSCOPY, DUODENOSCOPY (EGD), COMBINED  2012    Procedure: COMBINED ESOPHAGOSCOPY, GASTROSCOPY, DUODENOSCOPY (EGD);;  Surgeon: Chris Garcia MD;  Location: [facility]     ESOPHAGOSCOPY, GASTROSCOPY, DUODENOSCOPY (EGD), COMBINED N/A 2014    Procedure: COMBINED ESOPHAGOSCOPY, GASTROSCOPY, DUODENOSCOPY (EGD), BIOPSY SINGLE OR MULTIPLE;  Surgeon: Salvador Walter MD;  Location: [facility]     LAPAROSCOPIC BYPASS GASTRIC  2012    Procedure:LAPAROSCOPIC BYPASS GASTRIC; Laparoscopic Leticia-En-Y Gastric Bypass; Surgeon:SALVADOR WALTER; Location:[facility] OR     US BREAST BIOPSY RT        Allergies   Allergen Reactions     Nsaids GI Disturbance and Other (See Comments)     Hx gastric bypass      Social History     Tobacco Use     Smoking status: Never Smoker     Smokeless tobacco: Never Used   Substance Use Topics     Alcohol use: No      Wt Readings from Last 1 Encounters:   21 140.9 kg (310 lb 11.2 oz)        Anesthesia Evaluation   Pt has had prior anesthetic.     No history of anesthetic complications       ROS/MED HX  ENT/Pulmonary:  - neg pulmonary ROS  (-) tobacco use and asthma   Neurologic:  - neg neurologic ROS     Cardiovascular:     (+) hypertension-----    METS/Exercise Tolerance: >4 METS    Hematologic:     (+) anemia,  (-) history of blood clots and history of blood transfusion   Musculoskeletal:  - neg musculoskeletal ROS     GI/Hepatic:     (+) cholecystitis/cholelithiasis,  (-) GERD   Renal/Genitourinary:  - neg Renal ROS     Endo:     (+)  "Obesity,     Psychiatric/Substance Use:     (+) psychiatric history anxiety and depression     Infectious Disease:  - neg infectious disease ROS     Malignancy:  - neg malignancy ROS     Other:               OUTSIDE LABS:  CBC:   Lab Results   Component Value Date    WBC 15.4 (H) 06/02/2021    WBC 20.4 (H) 05/30/2021    HGB 10.5 (L) 06/02/2021    HGB 9.8 (L) 06/01/2021    HCT 31.6 (L) 06/02/2021    HCT 30.2 (L) 05/30/2021     06/03/2021     06/02/2021     BMP:   Lab Results   Component Value Date     06/02/2021     05/30/2021    POTASSIUM 4.2 06/02/2021    POTASSIUM 4.0 05/30/2021    CHLORIDE 105 06/02/2021    CHLORIDE 107 05/30/2021    CO2 28 06/02/2021    CO2 22 05/30/2021    BUN 8 06/02/2021    BUN 5 (L) 05/30/2021    CR 0.66 06/02/2021    CR 0.62 05/30/2021    GLC 71 06/02/2021    GLC 91 05/30/2021     COAGS: No results found for: PTT, INR, FIBR  POC:   Lab Results   Component Value Date    BGM 94 05/21/2012    HCG Positive (A) 10/07/2016     HEPATIC:   Lab Results   Component Value Date    ALBUMIN 2.4 (L) 06/02/2021    PROTTOTAL 6.3 (L) 06/02/2021    ALT 16 06/02/2021    AST 17 06/02/2021    ALKPHOS 108 06/02/2021    BILITOTAL 0.1 (L) 06/02/2021     OTHER:   Lab Results   Component Value Date    A1C 5.8 12/18/2014    MIGUE 9.0 06/02/2021    PHOS 4.6 (H) 05/19/2012    MAG 2.2 05/19/2012    LIPASE 118 06/02/2021    AMYLASE 60 06/02/2021       PAC Discussion and Assessment     ASA Classification: 3  Case is suitable for: [place]  Anesthetic techniques and relevant risks discussed: GA  Invasive monitoring and risk discussed: No     Possibility and Risk of blood transfusion discussed: No                 PAC Resident/NP Anesthesia Assessment: \"Ne Ne\" Reed is a 31 year old female scheduled for laparoscopic cholecystectomy on 7/30/21 by Dr. Walter in the treatment of choledocolithiasis.  PAC referral for risk assessment and optimization for anesthesia:     Pre-operative " considerations:  1.  Cardiac:  Functional status- METS >4. She can walk >2 blocks and has been walking recently since birth of her baby 21.  Intermediate risk surgery with 0.9% (RCRI #) risk of major adverse cardiac event.   --denies chest pain, SOB, PIEDRA, palpitations  --h/o HTN, but reports she has not needed antihypertensives since birth of baby     2.  Pulm: MEGHAN risk: Intermediate  --never smoker     3.  GI:  Risk of PONV score = 3.  If > 2, anti-emetic intervention recommended.  --h/o Leticia-en-Y    --presented to outside ED for RUQ pain and vomiting , abdominal US shows internal echogenic debris suggestive of sludge and/or small stones.  She was transferred to [place] where she was induced and delivered.  --above procedure now planned.     4.  Heme:  --iron deficiency anemia, hgb 10.5, 21.  Recheck DOS.     5.  Psych:  --hx of anxiety and depression, no current medications     6.  Other:  -- 21, doing well   --update labs DOS     VTE risk: 1.8% (BMI, FH of VTE)     Patient is optimized and is acceptable candidate for the proposed procedure.  No further diagnostic evaluation is needed.         Anesthesia Plan    ASA Status:  3      Anesthesia Type: General.     - Airway: ETT   Induction: Intravenous.   Maintenance: Balanced.   Techniques and Equipment:     - Airway: Video-Laryngoscope     - Lines/Monitors: 2nd IV     Consents            Postoperative Care    Pain management: Multi-modal analgesia.   PONV prophylaxis: Ondansetron (or other 5HT-3), Dexamethasone or Solumedrol     Comments:                Clifford Carpio Junior, MD

## 2021-07-30 ENCOUNTER — ANESTHESIA (OUTPATIENT)
Dept: SURGERY | Facility: CLINIC | Age: 31
End: 2021-07-30
Payer: COMMERCIAL

## 2021-07-30 ENCOUNTER — HOSPITAL ENCOUNTER (OUTPATIENT)
Facility: CLINIC | Age: 31
Discharge: HOME | End: 2021-07-30
Attending: SURGERY | Admitting: SURGERY
Payer: COMMERCIAL

## 2021-07-30 VITALS
SYSTOLIC BLOOD PRESSURE: 149 MMHG | OXYGEN SATURATION: 99 % | BODY MASS INDEX: 55.32 KG/M2 | TEMPERATURE: 97.9 F | RESPIRATION RATE: 16 BRPM | HEART RATE: 92 BPM | HEIGHT: 61 IN | DIASTOLIC BLOOD PRESSURE: 94 MMHG | WEIGHT: 293 LBS

## 2021-07-30 DIAGNOSIS — E66.1 CLASS 3 DRUG-INDUCED OBESITY WITH SERIOUS COMORBIDITY AND BODY MASS INDEX (BMI) OF 50.0 TO 59.9 IN ADULT (H): ICD-10-CM

## 2021-07-30 DIAGNOSIS — E66.01 OBESITY, MORBID (H): Primary | ICD-10-CM

## 2021-07-30 DIAGNOSIS — E66.813 CLASS 3 DRUG-INDUCED OBESITY WITH SERIOUS COMORBIDITY AND BODY MASS INDEX (BMI) OF 50.0 TO 59.9 IN ADULT (H): ICD-10-CM

## 2021-07-30 DIAGNOSIS — K80.50 CHOLEDOCHOLITHIASIS: ICD-10-CM

## 2021-07-30 LAB — GLUCOSE BLDC GLUCOMTR-MCNC: 83 MG/DL (ref 70–99)

## 2021-07-30 PROCEDURE — 999N000141 HC STATISTIC PRE-PROCEDURE NURSING ASSESSMENT: Performed by: SURGERY

## 2021-07-30 PROCEDURE — 272N000001 HC OR GENERAL SUPPLY STERILE: Performed by: SURGERY

## 2021-07-30 PROCEDURE — 250N000009 HC RX 250

## 2021-07-30 PROCEDURE — 250N000025 HC SEVOFLURANE, PER MIN: Performed by: SURGERY

## 2021-07-30 PROCEDURE — 710N000012 HC RECOVERY PHASE 2, PER MINUTE: Performed by: SURGERY

## 2021-07-30 PROCEDURE — 250N000013 HC RX MED GY IP 250 OP 250 PS 637

## 2021-07-30 PROCEDURE — 250N000011 HC RX IP 250 OP 636: Performed by: ANESTHESIOLOGY

## 2021-07-30 PROCEDURE — 250N000011 HC RX IP 250 OP 636: Performed by: SURGERY

## 2021-07-30 PROCEDURE — 710N000010 HC RECOVERY PHASE 1, LEVEL 2, PER MIN: Performed by: SURGERY

## 2021-07-30 PROCEDURE — 360N000076 HC SURGERY LEVEL 3, PER MIN: Performed by: SURGERY

## 2021-07-30 PROCEDURE — 258N000003 HC RX IP 258 OP 636

## 2021-07-30 PROCEDURE — 47562 LAPAROSCOPIC CHOLECYSTECTOMY: CPT | Mod: 79 | Performed by: SURGERY

## 2021-07-30 PROCEDURE — 88304 TISSUE EXAM BY PATHOLOGIST: CPT | Mod: TC | Performed by: SURGERY

## 2021-07-30 PROCEDURE — 258N000003 HC RX IP 258 OP 636: Performed by: PHYSICIAN ASSISTANT

## 2021-07-30 PROCEDURE — 250N000013 HC RX MED GY IP 250 OP 250 PS 637: Performed by: STUDENT IN AN ORGANIZED HEALTH CARE EDUCATION/TRAINING PROGRAM

## 2021-07-30 PROCEDURE — 250N000011 HC RX IP 250 OP 636

## 2021-07-30 PROCEDURE — 370N000017 HC ANESTHESIA TECHNICAL FEE, PER MIN: Performed by: SURGERY

## 2021-07-30 PROCEDURE — 88304 TISSUE EXAM BY PATHOLOGIST: CPT | Mod: 26 | Performed by: PATHOLOGY

## 2021-07-30 RX ORDER — SODIUM CHLORIDE, SODIUM LACTATE, POTASSIUM CHLORIDE, CALCIUM CHLORIDE 600; 310; 30; 20 MG/100ML; MG/100ML; MG/100ML; MG/100ML
INJECTION, SOLUTION INTRAVENOUS CONTINUOUS
Status: DISCONTINUED | OUTPATIENT
Start: 2021-07-30 | End: 2021-07-30 | Stop reason: HOSPADM

## 2021-07-30 RX ORDER — HYDRALAZINE HYDROCHLORIDE 20 MG/ML
2.5-5 INJECTION INTRAMUSCULAR; INTRAVENOUS EVERY 10 MIN PRN
Status: DISCONTINUED | OUTPATIENT
Start: 2021-07-30 | End: 2021-07-30 | Stop reason: HOSPADM

## 2021-07-30 RX ORDER — FENTANYL CITRATE 50 UG/ML
25 INJECTION, SOLUTION INTRAMUSCULAR; INTRAVENOUS EVERY 5 MIN PRN
Status: DISCONTINUED | OUTPATIENT
Start: 2021-07-30 | End: 2021-07-30 | Stop reason: HOSPADM

## 2021-07-30 RX ORDER — LABETALOL HYDROCHLORIDE 5 MG/ML
5-10 INJECTION, SOLUTION INTRAVENOUS EVERY 10 MIN PRN
Status: DISCONTINUED | OUTPATIENT
Start: 2021-07-30 | End: 2021-07-30 | Stop reason: HOSPADM

## 2021-07-30 RX ORDER — CEFAZOLIN SODIUM IN 0.9 % NACL 3 G/100 ML
3 INTRAVENOUS SOLUTION, PIGGYBACK (ML) INTRAVENOUS
Status: DISCONTINUED | OUTPATIENT
Start: 2021-07-30 | End: 2021-07-30 | Stop reason: HOSPADM

## 2021-07-30 RX ORDER — ONDANSETRON 4 MG/1
4 TABLET, ORALLY DISINTEGRATING ORAL EVERY 30 MIN PRN
Status: DISCONTINUED | OUTPATIENT
Start: 2021-07-30 | End: 2021-07-30 | Stop reason: HOSPADM

## 2021-07-30 RX ORDER — PROPOFOL 10 MG/ML
INJECTION, EMULSION INTRAVENOUS PRN
Status: DISCONTINUED | OUTPATIENT
Start: 2021-07-30 | End: 2021-07-30

## 2021-07-30 RX ORDER — ONDANSETRON 2 MG/ML
INJECTION INTRAMUSCULAR; INTRAVENOUS PRN
Status: DISCONTINUED | OUTPATIENT
Start: 2021-07-30 | End: 2021-07-30

## 2021-07-30 RX ORDER — ALBUTEROL SULFATE 0.83 MG/ML
2.5 SOLUTION RESPIRATORY (INHALATION) EVERY 4 HOURS PRN
Status: DISCONTINUED | OUTPATIENT
Start: 2021-07-30 | End: 2021-07-30 | Stop reason: HOSPADM

## 2021-07-30 RX ORDER — MEPERIDINE HYDROCHLORIDE 25 MG/ML
12.5 INJECTION INTRAMUSCULAR; INTRAVENOUS; SUBCUTANEOUS
Status: DISCONTINUED | OUTPATIENT
Start: 2021-07-30 | End: 2021-07-30 | Stop reason: HOSPADM

## 2021-07-30 RX ORDER — AMOXICILLIN 250 MG
1-2 CAPSULE ORAL 2 TIMES DAILY
Qty: 30 TABLET | Refills: 0 | Status: SHIPPED | OUTPATIENT
Start: 2021-07-30

## 2021-07-30 RX ORDER — OXYCODONE HYDROCHLORIDE 5 MG/1
5-10 TABLET ORAL EVERY 6 HOURS PRN
Qty: 12 TABLET | Refills: 0 | Status: SHIPPED | OUTPATIENT
Start: 2021-07-30 | End: 2021-08-02

## 2021-07-30 RX ORDER — NALOXONE HYDROCHLORIDE 0.4 MG/ML
0.2 INJECTION, SOLUTION INTRAMUSCULAR; INTRAVENOUS; SUBCUTANEOUS
Status: DISCONTINUED | OUTPATIENT
Start: 2021-07-30 | End: 2021-07-30 | Stop reason: HOSPADM

## 2021-07-30 RX ORDER — DIPHENHYDRAMINE HYDROCHLORIDE AND LIDOCAINE HYDROCHLORIDE AND ALUMINUM HYDROXIDE AND MAGNESIUM HYDRO
10 KIT EVERY 6 HOURS PRN
Status: DISCONTINUED | OUTPATIENT
Start: 2021-07-30 | End: 2021-07-30 | Stop reason: HOSPADM

## 2021-07-30 RX ORDER — OXYCODONE HYDROCHLORIDE 5 MG/1
5 TABLET ORAL
Status: DISCONTINUED | OUTPATIENT
Start: 2021-07-30 | End: 2021-07-30

## 2021-07-30 RX ORDER — PHENTERMINE HYDROCHLORIDE 37.5 MG/1
37.5 TABLET ORAL
Qty: 180 TABLET | Refills: 1 | Status: SHIPPED | OUTPATIENT
Start: 2021-07-30

## 2021-07-30 RX ORDER — BUPIVACAINE HYDROCHLORIDE 2.5 MG/ML
INJECTION, SOLUTION INFILTRATION; PERINEURAL PRN
Status: DISCONTINUED | OUTPATIENT
Start: 2021-07-30 | End: 2021-07-30 | Stop reason: HOSPADM

## 2021-07-30 RX ORDER — FENTANYL CITRATE 50 UG/ML
INJECTION, SOLUTION INTRAMUSCULAR; INTRAVENOUS PRN
Status: DISCONTINUED | OUTPATIENT
Start: 2021-07-30 | End: 2021-07-30

## 2021-07-30 RX ORDER — LIDOCAINE HYDROCHLORIDE 20 MG/ML
INJECTION, SOLUTION INFILTRATION; PERINEURAL PRN
Status: DISCONTINUED | OUTPATIENT
Start: 2021-07-30 | End: 2021-07-30

## 2021-07-30 RX ORDER — NALOXONE HYDROCHLORIDE 0.4 MG/ML
0.4 INJECTION, SOLUTION INTRAMUSCULAR; INTRAVENOUS; SUBCUTANEOUS
Status: DISCONTINUED | OUTPATIENT
Start: 2021-07-30 | End: 2021-07-30 | Stop reason: HOSPADM

## 2021-07-30 RX ORDER — HYDROMORPHONE HCL IN WATER/PF 6 MG/30 ML
0.2 PATIENT CONTROLLED ANALGESIA SYRINGE INTRAVENOUS EVERY 5 MIN PRN
Status: DISCONTINUED | OUTPATIENT
Start: 2021-07-30 | End: 2021-07-30 | Stop reason: HOSPADM

## 2021-07-30 RX ORDER — DEXAMETHASONE SODIUM PHOSPHATE 4 MG/ML
INJECTION, SOLUTION INTRA-ARTICULAR; INTRALESIONAL; INTRAMUSCULAR; INTRAVENOUS; SOFT TISSUE PRN
Status: DISCONTINUED | OUTPATIENT
Start: 2021-07-30 | End: 2021-07-30

## 2021-07-30 RX ORDER — OXYCODONE HYDROCHLORIDE 5 MG/1
5 TABLET ORAL EVERY 4 HOURS PRN
Status: DISCONTINUED | OUTPATIENT
Start: 2021-07-30 | End: 2021-07-30 | Stop reason: HOSPADM

## 2021-07-30 RX ORDER — ONDANSETRON 2 MG/ML
4 INJECTION INTRAMUSCULAR; INTRAVENOUS EVERY 30 MIN PRN
Status: DISCONTINUED | OUTPATIENT
Start: 2021-07-30 | End: 2021-07-30 | Stop reason: HOSPADM

## 2021-07-30 RX ORDER — HYDROMORPHONE HYDROCHLORIDE 1 MG/ML
0.2 INJECTION, SOLUTION INTRAMUSCULAR; INTRAVENOUS; SUBCUTANEOUS EVERY 5 MIN PRN
Status: DISCONTINUED | OUTPATIENT
Start: 2021-07-30 | End: 2021-07-30 | Stop reason: HOSPADM

## 2021-07-30 RX ORDER — FENTANYL CITRATE 50 UG/ML
25-50 INJECTION, SOLUTION INTRAMUSCULAR; INTRAVENOUS EVERY 5 MIN PRN
Status: DISCONTINUED | OUTPATIENT
Start: 2021-07-30 | End: 2021-07-30 | Stop reason: HOSPADM

## 2021-07-30 RX ORDER — PROPOFOL 10 MG/ML
INJECTION, EMULSION INTRAVENOUS CONTINUOUS PRN
Status: DISCONTINUED | OUTPATIENT
Start: 2021-07-30 | End: 2021-07-30

## 2021-07-30 RX ORDER — LIDOCAINE 40 MG/G
CREAM TOPICAL
Status: DISCONTINUED | OUTPATIENT
Start: 2021-07-30 | End: 2021-07-30 | Stop reason: HOSPADM

## 2021-07-30 RX ORDER — ACETAMINOPHEN 325 MG/1
650 TABLET ORAL
Status: DISCONTINUED | OUTPATIENT
Start: 2021-07-30 | End: 2021-07-30 | Stop reason: HOSPADM

## 2021-07-30 RX ORDER — CEFAZOLIN SODIUM 1 G/3ML
1 INJECTION, POWDER, FOR SOLUTION INTRAMUSCULAR; INTRAVENOUS SEE ADMIN INSTRUCTIONS
Status: DISCONTINUED | OUTPATIENT
Start: 2021-07-30 | End: 2021-07-30 | Stop reason: HOSPADM

## 2021-07-30 RX ADMIN — HYDROMORPHONE HYDROCHLORIDE 0.2 MG: 0.2 INJECTION, SOLUTION INTRAMUSCULAR; INTRAVENOUS; SUBCUTANEOUS at 12:30

## 2021-07-30 RX ADMIN — HYDROMORPHONE HYDROCHLORIDE 0.2 MG: 1 INJECTION, SOLUTION INTRAMUSCULAR; INTRAVENOUS; SUBCUTANEOUS at 10:25

## 2021-07-30 RX ADMIN — PROPOFOL 200 MG: 10 INJECTION, EMULSION INTRAVENOUS at 08:00

## 2021-07-30 RX ADMIN — FENTANYL CITRATE 100 MCG: 50 INJECTION, SOLUTION INTRAMUSCULAR; INTRAVENOUS at 07:57

## 2021-07-30 RX ADMIN — HYDROMORPHONE HYDROCHLORIDE 0.2 MG: 0.2 INJECTION, SOLUTION INTRAMUSCULAR; INTRAVENOUS; SUBCUTANEOUS at 12:00

## 2021-07-30 RX ADMIN — PROPOFOL 200 MG: 10 INJECTION, EMULSION INTRAVENOUS at 07:57

## 2021-07-30 RX ADMIN — HYDROMORPHONE HYDROCHLORIDE 0.2 MG: 1 INJECTION, SOLUTION INTRAMUSCULAR; INTRAVENOUS; SUBCUTANEOUS at 09:50

## 2021-07-30 RX ADMIN — FENTANYL CITRATE 50 MCG: 50 INJECTION, SOLUTION INTRAMUSCULAR; INTRAVENOUS at 08:27

## 2021-07-30 RX ADMIN — LIDOCAINE HYDROCHLORIDE 100 MG: 20 INJECTION, SOLUTION INFILTRATION; PERINEURAL at 07:57

## 2021-07-30 RX ADMIN — HYDROMORPHONE HYDROCHLORIDE 0.2 MG: 1 INJECTION, SOLUTION INTRAMUSCULAR; INTRAVENOUS; SUBCUTANEOUS at 10:31

## 2021-07-30 RX ADMIN — HYDROMORPHONE HYDROCHLORIDE 0.2 MG: 0.2 INJECTION, SOLUTION INTRAMUSCULAR; INTRAVENOUS; SUBCUTANEOUS at 12:12

## 2021-07-30 RX ADMIN — SODIUM CHLORIDE, POTASSIUM CHLORIDE, SODIUM LACTATE AND CALCIUM CHLORIDE: 600; 310; 30; 20 INJECTION, SOLUTION INTRAVENOUS at 07:54

## 2021-07-30 RX ADMIN — FENTANYL CITRATE 50 MCG: 50 INJECTION, SOLUTION INTRAMUSCULAR; INTRAVENOUS at 09:41

## 2021-07-30 RX ADMIN — ONDANSETRON 4 MG: 2 INJECTION INTRAMUSCULAR; INTRAVENOUS at 09:48

## 2021-07-30 RX ADMIN — HYDROMORPHONE HYDROCHLORIDE 0.2 MG: 0.2 INJECTION, SOLUTION INTRAMUSCULAR; INTRAVENOUS; SUBCUTANEOUS at 12:18

## 2021-07-30 RX ADMIN — ROCURONIUM BROMIDE 100 MG: 10 INJECTION INTRAVENOUS at 07:58

## 2021-07-30 RX ADMIN — PHENYLEPHRINE HYDROCHLORIDE 100 MCG: 10 INJECTION INTRAVENOUS at 08:29

## 2021-07-30 RX ADMIN — DEXAMETHASONE SODIUM PHOSPHATE 8 MG: 4 INJECTION, SOLUTION INTRA-ARTICULAR; INTRALESIONAL; INTRAMUSCULAR; INTRAVENOUS; SOFT TISSUE at 08:19

## 2021-07-30 RX ADMIN — HYDROMORPHONE HYDROCHLORIDE 0.5 MG: 1 INJECTION, SOLUTION INTRAMUSCULAR; INTRAVENOUS; SUBCUTANEOUS at 09:20

## 2021-07-30 RX ADMIN — ACETAMINOPHEN 650 MG: 325 TABLET, FILM COATED ORAL at 12:00

## 2021-07-30 RX ADMIN — FENTANYL CITRATE 50 MCG: 50 INJECTION, SOLUTION INTRAMUSCULAR; INTRAVENOUS at 08:44

## 2021-07-30 RX ADMIN — SUGAMMADEX 200 MG: 100 INJECTION, SOLUTION INTRAVENOUS at 09:28

## 2021-07-30 RX ADMIN — CEFAZOLIN 3 G: 1 INJECTION, POWDER, FOR SOLUTION INTRAMUSCULAR; INTRAVENOUS at 08:02

## 2021-07-30 RX ADMIN — OXYCODONE HYDROCHLORIDE 5 MG: 5 TABLET ORAL at 10:20

## 2021-07-30 RX ADMIN — PROPOFOL 50 MCG/KG/MIN: 10 INJECTION, EMULSION INTRAVENOUS at 08:05

## 2021-07-30 RX ADMIN — ONDANSETRON 4 MG: 2 INJECTION INTRAMUSCULAR; INTRAVENOUS at 09:09

## 2021-07-30 ASSESSMENT — MIFFLIN-ST. JEOR: SCORE: 2109.38

## 2021-07-30 NOTE — ANESTHESIA POSTPROCEDURE EVALUATION
Patient: Morgan Mcbride    Procedure(s):  CHOLECYSTECTOMY, LAPAROSCOPIC    Diagnosis:Choledocholithiasis [K80.50]  Diagnosis Additional Information: No value filed.    Anesthesia Type:  General    Note:  Disposition: Outpatient   Postop Pain Control: Uneventful            Sign Out: Well controlled pain   PONV: No   Neuro/Psych: Uneventful            Sign Out: Acceptable/Baseline neuro status   Airway/Respiratory: Uneventful            Sign Out: Acceptable/Baseline resp. status   CV/Hemodynamics: Uneventful            Sign Out: Acceptable CV status; No obvious hypovolemia; No obvious fluid overload   Other NRE: NONE   DID A NON-ROUTINE EVENT OCCUR? No           Last vitals:  Vitals Value Taken Time   /80 07/30/21 1050   Temp 36.8  C (98.2  F) 07/30/21 0940   Pulse 71 07/30/21 1053   Resp 21 07/30/21 1053   SpO2 97 % 07/30/21 1053   Vitals shown include unvalidated device data.    Electronically Signed By: Gerber Kay MD  July 30, 2021  10:55 AM

## 2021-07-30 NOTE — ANESTHESIA PROCEDURE NOTES
Airway       Patient location during procedure: OR       Procedure Start/Stop Times: 7/30/2021 8:01 AM  Staff -        Anesthesiologist:  Guicho Belcher MD       Resident/Fellow: Clifford Bryant Jr., MD       Performed By: residentIndications and Patient Condition       Indications for airway management: aly-procedural       Induction type:intravenous       Mask difficulty assessment: 2 - vent by mask + OA or adjuvant +/- NMBA    Final Airway Details       Final airway type: endotracheal airway       Successful airway: ETT - single  Endotracheal Airway Details        ETT size (mm): 7.5       Cuffed: yes       Successful intubation technique: direct laryngoscopy       DL Blade Type: MAC 4       Grade View of Cords: 1       Adjucts: stylet       Position: Right       Measured from: gums/teeth       Secured at (cm): 24       Bite block used: None    Post intubation assessment        Placement verified by: capnometry, equal breath sounds and chest rise        Number of attempts at approach: 1       Secured with: pink tape       Ease of procedure: easy       Dentition: Unchanged

## 2021-07-30 NOTE — OR NURSING
Patient still complaining of pain despite IV and oral pain meds. Offered to get pain better under control before patient leaves, however pt was anxious to leave and thought she would feel better at home and declined. Instructed patient to call clinic if she feels like her pain is not well enough controlled with prescribed medications. Patient and significant other were in agreement with plan and patient was discharged home.

## 2021-07-30 NOTE — OR NURSING
Pt c/o throat tightness. Dr. Kay at bedside, discussed and assessed pt, and said he will put in an order for magic mouthwash, thinks it is a sore throat and he will place sign out for pt.

## 2021-07-30 NOTE — DISCHARGE INSTRUCTIONS
[facility]  Same-Day Surgery   Adult Discharge Orders & Instructions     For 24 hours after surgery    1. Get plenty of rest.  A responsible adult must stay with you for at least 24 hours after you leave the hospital.   2. Do not drive or use heavy equipment.  If you have weakness or tingling, don't drive or use heavy equipment until this feeling goes away.  3. Do not drink alcohol.  4. Avoid strenuous or risky activities.  Ask for help when climbing stairs.   5. You may feel lightheaded.  IF so, sit for a few minutes before standing.  Have someone help you get up.   6. If you have nausea (feel sick to your stomach): Drink only clear liquids such as apple juice, ginger ale, broth or 7-Up.  Rest may also help.  Be sure to drink enough fluids.  Move to a regular diet as you feel able.  7. You may have a slight fever. Call the doctor if your fever is over 100 F (37.7 C) (taken under the tongue) or lasts longer than 24 hours.  8. You may have a dry mouth, a sore throat, muscle aches or trouble sleeping.  These should go away after 24 hours.  9. Do not make important or legal decisions.   Call your doctor for any of the followin.  Signs of infection (fever, growing tenderness at the surgery site, a large amount of drainage or bleeding, severe pain, foul-smelling drainage, redness, swelling).    2. It has been over 8 to 10 hours since surgery and you are still not able to urinate (pass water).    3.  Headache for over 24 hours.    To contact a doctor, call Dr Walter's clinic at [phone]  Or: [phone] and ask for the resident on call for General Surgery (answered 24 hours a day)      Emergency Department:    [facility]: [phone]       (TTY for hearing impaired: [phone])

## 2021-07-30 NOTE — ANESTHESIA CARE TRANSFER NOTE
Patient: Morgan Mcbride    Procedure(s):  CHOLECYSTECTOMY, LAPAROSCOPIC    Diagnosis: Choledocholithiasis [K80.50]  Diagnosis Additional Information: No value filed.    Anesthesia Type:   General     Note:    Oropharynx: oropharynx clear of all foreign objects and spontaneously breathing  Level of Consciousness: awake  Oxygen Supplementation: face mask  Level of Supplemental Oxygen (L/min / FiO2): 8  Independent Airway: airway patency satisfactory and stable  Dentition: dentition unchanged  Vital Signs Stable: post-procedure vital signs reviewed and stable  Report to RN Given: handoff report given  Patient transferred to: PACU    Handoff Report: Identifed the Patient, Identified the Reponsible Provider, Reviewed the pertinent medical history, Discussed the surgical course, Reviewed Intra-OP anesthesia mangement and issues during anesthesia, Set expectations for post-procedure period and Allowed opportunity for questions and acknowledgement of understanding      Vitals:  Vitals Value Taken Time   /60 07/30/21 0940   Temp     Pulse 98 07/30/21 0942   Resp 19 07/30/21 0942   SpO2 97 % 07/30/21 0942   Vitals shown include unvalidated device data.    Electronically Signed By: Clifford Carpio Junior, MD  July 30, 2021  9:43 AM

## 2021-07-30 NOTE — OP NOTE
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  The gallbladder was normal in appearance   Complications: None.   Implants: None.       OPERATIVE INDICATIONS:  Morgan Mcbride is a 31 year old female with a history of RYGB and choledocholithiasis.      After understanding the risks and benefits of proceeding with surgery, the patient has an indication for laparoscopic cholecystectomy and consented to undergo surgery.    I reviewed the risks of surgery with Morgan Mcbride.    These include, but are not limited to, death, myocardial infarction, pneumonia, urinary tract infection, deep venous thrombosis with or without pulmonary embolus, abdominal infection from bowel injury or abscess, bowel obstruction, wound infection, and bleeding.    More specific risks related to laparoscopic cholecystectomy include bile duct injury (3/1000), bile leak (10/1000), retained common bile duct stone (10/1000), postcholecystectomy diarrhea (1-2%) and these complications may require additional treatment.    OPERATIVE DETAILS:      The patient was brought to the operating room and prepared in a routine fashion.  A timeout was performed prior to surgery and documented by the nursing team.     Under the benefits of general anesthesia, a left upper quadrant Veress needle was inserted  and pneumoperitoneum was established using carbon dioxide gas to a maximum pressure of 15 mmHg.  A total of 4 ports were placed and the laparoscope was utilized from the umbilical port.     The patient was moved into a steep reverse Trendelenberg position.     The gallbladder was grasped at its fundus and retracted cephalad.  It was also grasped at its infundibulum and retracted laterally.       Findings related to the gallbladder are as noted above.     A complete dissection starting on the gallbladder, identifying the cystic artery on the surface of the gallbladder, was performed.  The cystic artery in this manner was  away from the gallbladder and the infundibulum of the gallbladder and the junction of gallbladder and cystic duct was clearly and completely identified with blunt dissection.  All of this dissection was performed on the gallbladder wall and clearly above the triangle of Calot.     Next, a dissection of gallbladder retrograde from fundus and the peritoneum was divided along the lateral aspect of the gallbladder. Next, a complete dissection of the upper aspect of the triangle of Calot was performed and the critical view of safety was achieved.     The cystic artery and cystic duct were clipped securely and divided between clips. The gallbladder was then removed out of its fossa using electrocautery.  It was placed into an Endocatch bag and removed from the abdomen.    Given her Leticia-en-Y gastric bypass anatomy, the small bowel was run. The antecolic Leticia limb was inspected from gastrojejunal anastomosis to the common channel. The Casas defect was previously closed. The BP limb was inspected and no issues noted. The JJ anastomosis was patent, and the mesenteric defect was noted to be closed. The common channel was unremarkable.      Complete hemostasis was achieved.     The skin was closed using 4-0 monocryl suture and skin glue were applied.    I was present for all critical components of  the operation and all needle and sponge counts were correct x2 at the end of the procedure.    Salvador Walter MD  Surgery  [phone] (hospital )

## 2021-08-02 PROBLEM — M54.50 ACUTE BILATERAL LOW BACK PAIN WITHOUT SCIATICA: Status: RESOLVED | Noted: 2021-01-15 | Resolved: 2021-08-02

## 2021-08-03 ENCOUNTER — PATIENT OUTREACH (OUTPATIENT)
Dept: ENDOCRINOLOGY | Facility: CLINIC | Age: 31
End: 2021-08-03

## 2021-08-03 NOTE — PROGRESS NOTES
RN Post-Op/Post-Discharge Care Coordination Note    Ms. Morgan Mcbride is a 31 year old female who underwent laparoscopic cholecystectomy on 21 with  Dr. Too Walter.  Spoke with Patient.    Support  Patient able to care for self independently     Health Status  Nausea/Vomiting: Patient denies nausea/vomiting.  Eating/drinking: Patient is able to eat and drink without any complaints.  Bowel habits: Patient reports having a normal bowel movement.  Drains (STEPHEN): N/A  Fevers/chills: Patient stated she felt chilled yesterday.  Incisions: Patient denies any signs and symptoms of infection..  Wound closure:  Skin Sealant  Pain: 7 at its worst.    New Medications:  Oxycodone.  Now using Tylenol for pain.    Activity/Restrictions  No lifting in excess of 15-20 pounds for 3-4 weeks    Equipment  None    Pathology reviewed with patient:  No, not yet available    Forms/Letters  No    All of her questions were answered including reviewing restrictions, and wound care.  She will call this office if she has any further questions and/or concerns.  She has been caring for her  and other children.  Discussed the importance of self care and limiting bending, twisting and lifting.      In lieu of a post-op clinic patient that patient would like to be contacted in approximately 7-10 days via telephone.    A copy of this note was routed to the primary surgeon.      Whom and When to Call  Patient acknowledges understanding of how to manage any medication changes and   when to seek medical care.     Patient advised that if after hour medical concerns arise to please call [phone] and choose option 4 to speak to the physician on call.

## 2021-08-04 LAB
PATH REPORT.COMMENTS IMP SPEC: NORMAL
PATH REPORT.COMMENTS IMP SPEC: NORMAL
PATH REPORT.FINAL DX SPEC: NORMAL
PATH REPORT.GROSS SPEC: NORMAL
PATH REPORT.MICROSCOPIC SPEC OTHER STN: NORMAL
PATH REPORT.RELEVANT HX SPEC: NORMAL
PHOTO IMAGE: NORMAL

## 2021-08-17 ENCOUNTER — TELEPHONE (OUTPATIENT)
Dept: ENDOCRINOLOGY | Facility: CLINIC | Age: 31
End: 2021-08-17

## 2021-08-17 NOTE — TELEPHONE ENCOUNTER
Called pt for 10:30 appt as she was not on video.    Pt answered - stated she forgot about appointment as she did not get a MyChart notification or text.     Is busy with her 2 month old daughter right now - just got out of hospital per pt as daughter has RSV. Plan to re-schedule to a better time.    Pt knows number to re-schedule.    María Torres, SAL, RD, LD

## 2021-09-19 ENCOUNTER — HEALTH MAINTENANCE LETTER (OUTPATIENT)
Age: 31
End: 2021-09-19

## 2022-01-09 ENCOUNTER — HEALTH MAINTENANCE LETTER (OUTPATIENT)
Age: 32
End: 2022-01-09

## 2022-01-27 DIAGNOSIS — E66.01 OBESITY, MORBID (H): ICD-10-CM

## 2022-01-28 RX ORDER — PHENTERMINE HYDROCHLORIDE 37.5 MG/1
37.5 TABLET ORAL
OUTPATIENT
Start: 2022-01-28

## 2022-01-28 NOTE — TELEPHONE ENCOUNTER
Phentermine HCl 37.5 MG Oral Tablet  Last Written Prescription Date:  7/30/2021  Last Fill Quantity: 180,   # refills: 1  Last Office Visit :  7/22/2021  Future Office visit:  None    Routing refill request to provider for review/approval because:  Last filled by [facility]  Please send new updated order to pharmacy with updated Providers signature for Pt care.       Anette Retana RN  Central Triage Red Flags/Med Refills

## 2022-01-28 NOTE — TELEPHONE ENCOUNTER
Jerri Pena  You 19 minutes ago (11:18 AM)     TH    Please refuse and close. Thanks!     Routing comment

## 2022-01-28 NOTE — TELEPHONE ENCOUNTER
Refill denied at this time. Patient needs appointment with Fatou Maki CNP. [email or web address] message sent to patient to schedule appointment.

## 2022-02-09 ENCOUNTER — TELEPHONE (OUTPATIENT)
Dept: ENDOCRINOLOGY | Facility: CLINIC | Age: 32
End: 2022-02-09
Payer: COMMERCIAL

## 2022-02-09 DIAGNOSIS — E66.01 OBESITY, MORBID (H): ICD-10-CM

## 2022-02-09 RX ORDER — PHENTERMINE HYDROCHLORIDE 37.5 MG/1
TABLET ORAL
Qty: 60 TABLET | Refills: 0 | OUTPATIENT
Start: 2022-02-09

## 2022-02-09 NOTE — TELEPHONE ENCOUNTER
Phentermine HCl 37.5 MG Oral Tablet  Last Written Prescription Date:   7/30/2021  Last Fill Quantity: 180,   # refills: 1  Last Office Visit :  7/22/2021  Future Office visit:  None    Routing refill request to provider for review/approval because:  Second request  Med denied  Pt needs an office visit  Scheduling notified       Anette Retana RN  Central Triage Red Flags/Med Refills

## 2022-03-02 NOTE — PROGRESS NOTES
Ne Ne did not answer check in attempt x3. Voicemail box is full. Patient has not completed questionnaire nor is checked in through [employer].    JOCELYNE GabrielT

## 2022-03-03 ENCOUNTER — VIRTUAL VISIT (OUTPATIENT)
Dept: ENDOCRINOLOGY | Facility: CLINIC | Age: 32
End: 2022-03-03
Payer: COMMERCIAL

## 2022-03-03 DIAGNOSIS — Z91.199 NO-SHOW FOR APPOINTMENT: Primary | ICD-10-CM

## 2022-03-03 PROCEDURE — 99207 PR NO CHARGE LOS: CPT | Performed by: NURSE PRACTITIONER

## 2022-03-03 NOTE — LETTER
Date:March 16, 2022      Provider requested that no letter be sent. Do not send.       [facility]

## 2022-03-03 NOTE — LETTER
3/3/2022       RE: Morgan Mcbride  [address] [address]     Dear Colleague,    Thank you for referring your patient, Morgan Mcbride, to the [facility] [place] [facility]. Please see a copy of my visit note below.    Ne Ne did not answer check in attempt x3. Voicemail box is full. Patient has not completed questionnaire nor is checked in through [employer].    Megan Arredondo NREMT          Again, thank you for allowing me to participate in the care of your patient.      Sincerely,    Fatou Maki NP

## 2022-04-12 ENCOUNTER — TELEPHONE (OUTPATIENT)
Dept: ENDOCRINOLOGY | Facility: CLINIC | Age: 32
End: 2022-04-12
Payer: COMMERCIAL

## 2022-04-12 NOTE — TELEPHONE ENCOUNTER
Called and spoke with patient in regards to records. Patient requesting her records be sent to [facility]. Advised patient to contact medical records to obtain her records. Also attached an [facility].

## 2022-09-21 ENCOUNTER — APPOINTMENT (OUTPATIENT)
Dept: CT IMAGING | Facility: CLINIC | Age: 32
End: 2022-09-21
Attending: EMERGENCY MEDICINE
Payer: COMMERCIAL

## 2022-09-21 ENCOUNTER — APPOINTMENT (OUTPATIENT)
Dept: GENERAL RADIOLOGY | Facility: CLINIC | Age: 32
End: 2022-09-21
Attending: EMERGENCY MEDICINE
Payer: COMMERCIAL

## 2022-09-21 ENCOUNTER — HOSPITAL ENCOUNTER (EMERGENCY)
Facility: CLINIC | Age: 32
Discharge: HOME | End: 2022-09-22
Attending: EMERGENCY MEDICINE | Admitting: EMERGENCY MEDICINE
Payer: COMMERCIAL

## 2022-09-21 DIAGNOSIS — R07.9 CHEST PAIN, UNSPECIFIED TYPE: ICD-10-CM

## 2022-09-21 DIAGNOSIS — R10.13 ABDOMINAL PAIN, EPIGASTRIC: ICD-10-CM

## 2022-09-21 LAB
ALBUMIN SERPL-MCNC: 4 G/DL (ref 3.4–5)
ALBUMIN UR-MCNC: 10 MG/DL
ALP SERPL-CCNC: 160 U/L (ref 40–150)
ALT SERPL W P-5'-P-CCNC: 105 U/L (ref 0–50)
AMPHETAMINES UR QL SCN: ABNORMAL
ANION GAP SERPL CALCULATED.3IONS-SCNC: 9 MMOL/L (ref 3–14)
APPEARANCE UR: CLEAR
AST SERPL W P-5'-P-CCNC: 150 U/L (ref 0–45)
BACTERIA #/AREA URNS HPF: ABNORMAL /HPF
BARBITURATES UR QL: ABNORMAL
BASOPHILS # BLD AUTO: 0.1 10E3/UL (ref 0–0.2)
BASOPHILS NFR BLD AUTO: 1 %
BENZODIAZ UR QL: ABNORMAL
BILIRUB SERPL-MCNC: 1 MG/DL (ref 0.2–1.3)
BILIRUB UR QL STRIP: NEGATIVE
BUN SERPL-MCNC: 13 MG/DL (ref 7–30)
CALCIUM SERPL-MCNC: 9.3 MG/DL (ref 8.5–10.1)
CANNABINOIDS UR QL SCN: ABNORMAL
CHLORIDE BLD-SCNC: 103 MMOL/L (ref 94–109)
CO2 SERPL-SCNC: 25 MMOL/L (ref 20–32)
COCAINE UR QL: ABNORMAL
COLOR UR AUTO: ABNORMAL
CREAT SERPL-MCNC: 0.85 MG/DL (ref 0.52–1.04)
EOSINOPHIL # BLD AUTO: 0 10E3/UL (ref 0–0.7)
EOSINOPHIL NFR BLD AUTO: 0 %
ERYTHROCYTE [DISTWIDTH] IN BLOOD BY AUTOMATED COUNT: 14.9 % (ref 10–15)
FLUAV RNA SPEC QL NAA+PROBE: NEGATIVE
FLUBV RNA RESP QL NAA+PROBE: NEGATIVE
GFR SERPL CREATININE-BSD FRML MDRD: >90 ML/MIN/1.73M2
GLUCOSE BLD-MCNC: 90 MG/DL (ref 70–99)
GLUCOSE UR STRIP-MCNC: NEGATIVE MG/DL
HCG SERPL QL: NEGATIVE
HCT VFR BLD AUTO: 41.2 % (ref 35–47)
HGB BLD-MCNC: 14.2 G/DL (ref 11.7–15.7)
HGB UR QL STRIP: NEGATIVE
IMM GRANULOCYTES # BLD: 0 10E3/UL
IMM GRANULOCYTES NFR BLD: 0 %
KETONES UR STRIP-MCNC: 60 MG/DL
LACTATE SERPL-SCNC: 1.1 MMOL/L (ref 0.7–2)
LEUKOCYTE ESTERASE UR QL STRIP: ABNORMAL
LIPASE SERPL-CCNC: 208 U/L (ref 73–393)
LYMPHOCYTES # BLD AUTO: 1 10E3/UL (ref 0.8–5.3)
LYMPHOCYTES NFR BLD AUTO: 8 %
MCH RBC QN AUTO: 30.9 PG (ref 26.5–33)
MCHC RBC AUTO-ENTMCNC: 34.5 G/DL (ref 31.5–36.5)
MCV RBC AUTO: 90 FL (ref 78–100)
MONOCYTES # BLD AUTO: 0.5 10E3/UL (ref 0–1.3)
MONOCYTES NFR BLD AUTO: 4 %
MUCOUS THREADS #/AREA URNS LPF: PRESENT /LPF
NEUTROPHILS # BLD AUTO: 10 10E3/UL (ref 1.6–8.3)
NEUTROPHILS NFR BLD AUTO: 87 %
NITRATE UR QL: NEGATIVE
NRBC # BLD AUTO: 0 10E3/UL
NRBC BLD AUTO-RTO: 0 /100
OPIATES UR QL SCN: ABNORMAL
PH UR STRIP: 6.5 [PH] (ref 5–7)
PLATELET # BLD AUTO: 350 10E3/UL (ref 150–450)
POTASSIUM BLD-SCNC: 4 MMOL/L (ref 3.4–5.3)
PROT SERPL-MCNC: 8.7 G/DL (ref 6.8–8.8)
RBC # BLD AUTO: 4.6 10E6/UL (ref 3.8–5.2)
RBC URINE: 3 /HPF
RSV RNA SPEC NAA+PROBE: NEGATIVE
SARS-COV-2 RNA RESP QL NAA+PROBE: NEGATIVE
SODIUM SERPL-SCNC: 137 MMOL/L (ref 133–144)
SP GR UR STRIP: >1.05 (ref 1–1.03)
SQUAMOUS EPITHELIAL: 9 /HPF
UROBILINOGEN UR STRIP-MCNC: NORMAL MG/DL
WBC # BLD AUTO: 11.6 10E3/UL (ref 4–11)
WBC URINE: 6 /HPF

## 2022-09-21 PROCEDURE — 83605 ASSAY OF LACTIC ACID: CPT | Performed by: EMERGENCY MEDICINE

## 2022-09-21 PROCEDURE — 74018 RADEX ABDOMEN 1 VIEW: CPT

## 2022-09-21 PROCEDURE — 96361 HYDRATE IV INFUSION ADD-ON: CPT | Performed by: EMERGENCY MEDICINE

## 2022-09-21 PROCEDURE — 36415 COLL VENOUS BLD VENIPUNCTURE: CPT | Performed by: EMERGENCY MEDICINE

## 2022-09-21 PROCEDURE — 96375 TX/PRO/DX INJ NEW DRUG ADDON: CPT | Performed by: EMERGENCY MEDICINE

## 2022-09-21 PROCEDURE — 87637 SARSCOV2&INF A&B&RSV AMP PRB: CPT | Performed by: EMERGENCY MEDICINE

## 2022-09-21 PROCEDURE — 250N000011 HC RX IP 250 OP 636: Performed by: EMERGENCY MEDICINE

## 2022-09-21 PROCEDURE — 250N000013 HC RX MED GY IP 250 OP 250 PS 637: Performed by: EMERGENCY MEDICINE

## 2022-09-21 PROCEDURE — 250N000009 HC RX 250: Performed by: EMERGENCY MEDICINE

## 2022-09-21 PROCEDURE — 85004 AUTOMATED DIFF WBC COUNT: CPT | Performed by: EMERGENCY MEDICINE

## 2022-09-21 PROCEDURE — 258N000003 HC RX IP 258 OP 636: Performed by: EMERGENCY MEDICINE

## 2022-09-21 PROCEDURE — 93010 ELECTROCARDIOGRAM REPORT: CPT | Performed by: EMERGENCY MEDICINE

## 2022-09-21 PROCEDURE — 96374 THER/PROPH/DIAG INJ IV PUSH: CPT | Mod: 59 | Performed by: EMERGENCY MEDICINE

## 2022-09-21 PROCEDURE — 93005 ELECTROCARDIOGRAM TRACING: CPT | Performed by: EMERGENCY MEDICINE

## 2022-09-21 PROCEDURE — 80307 DRUG TEST PRSMV CHEM ANLYZR: CPT | Performed by: EMERGENCY MEDICINE

## 2022-09-21 PROCEDURE — 99285 EMERGENCY DEPT VISIT HI MDM: CPT | Performed by: EMERGENCY MEDICINE

## 2022-09-21 PROCEDURE — 96376 TX/PRO/DX INJ SAME DRUG ADON: CPT | Mod: 59 | Performed by: EMERGENCY MEDICINE

## 2022-09-21 PROCEDURE — 84703 CHORIONIC GONADOTROPIN ASSAY: CPT | Performed by: EMERGENCY MEDICINE

## 2022-09-21 PROCEDURE — 71275 CT ANGIOGRAPHY CHEST: CPT

## 2022-09-21 PROCEDURE — 74177 CT ABD & PELVIS W/CONTRAST: CPT

## 2022-09-21 PROCEDURE — 81001 URINALYSIS AUTO W/SCOPE: CPT | Performed by: EMERGENCY MEDICINE

## 2022-09-21 PROCEDURE — C9803 HOPD COVID-19 SPEC COLLECT: HCPCS | Performed by: EMERGENCY MEDICINE

## 2022-09-21 PROCEDURE — 99285 EMERGENCY DEPT VISIT HI MDM: CPT | Mod: 25 | Performed by: EMERGENCY MEDICINE

## 2022-09-21 PROCEDURE — 83690 ASSAY OF LIPASE: CPT | Performed by: EMERGENCY MEDICINE

## 2022-09-21 PROCEDURE — 80053 COMPREHEN METABOLIC PANEL: CPT | Performed by: EMERGENCY MEDICINE

## 2022-09-21 RX ORDER — ONDANSETRON 2 MG/ML
4 INJECTION INTRAMUSCULAR; INTRAVENOUS EVERY 30 MIN PRN
Status: DISCONTINUED | OUTPATIENT
Start: 2022-09-21 | End: 2022-09-22 | Stop reason: HOSPADM

## 2022-09-21 RX ORDER — IOPAMIDOL 755 MG/ML
100 INJECTION, SOLUTION INTRAVASCULAR ONCE
Status: COMPLETED | OUTPATIENT
Start: 2022-09-21 | End: 2022-09-21

## 2022-09-21 RX ORDER — HYDROCODONE BITARTRATE AND ACETAMINOPHEN 5; 325 MG/1; MG/1
1 TABLET ORAL ONCE
Status: COMPLETED | OUTPATIENT
Start: 2022-09-21 | End: 2022-09-21

## 2022-09-21 RX ORDER — SODIUM CHLORIDE 9 MG/ML
INJECTION, SOLUTION INTRAVENOUS CONTINUOUS
Status: DISCONTINUED | OUTPATIENT
Start: 2022-09-21 | End: 2022-09-22 | Stop reason: HOSPADM

## 2022-09-21 RX ORDER — MORPHINE SULFATE 4 MG/ML
4 INJECTION, SOLUTION INTRAMUSCULAR; INTRAVENOUS
Status: DISCONTINUED | OUTPATIENT
Start: 2022-09-21 | End: 2022-09-22 | Stop reason: HOSPADM

## 2022-09-21 RX ADMIN — SODIUM CHLORIDE: 9 INJECTION, SOLUTION INTRAVENOUS at 23:06

## 2022-09-21 RX ADMIN — IOPAMIDOL 77 ML: 755 INJECTION, SOLUTION INTRAVENOUS at 22:29

## 2022-09-21 RX ADMIN — MORPHINE SULFATE 4 MG: 4 INJECTION INTRAVENOUS at 18:15

## 2022-09-21 RX ADMIN — SODIUM CHLORIDE 74 ML: 9 INJECTION, SOLUTION INTRAVENOUS at 18:49

## 2022-09-21 RX ADMIN — MORPHINE SULFATE 4 MG: 4 INJECTION INTRAVENOUS at 16:40

## 2022-09-21 RX ADMIN — IOPAMIDOL 124 ML: 755 INJECTION, SOLUTION INTRAVENOUS at 18:48

## 2022-09-21 RX ADMIN — SODIUM CHLORIDE 1000 ML: 9 INJECTION, SOLUTION INTRAVENOUS at 16:37

## 2022-09-21 RX ADMIN — SODIUM CHLORIDE 90 ML: 9 INJECTION, SOLUTION INTRAVENOUS at 22:30

## 2022-09-21 RX ADMIN — ONDANSETRON 4 MG: 2 INJECTION INTRAMUSCULAR; INTRAVENOUS at 23:05

## 2022-09-21 RX ADMIN — HYDROCODONE BITARTRATE AND ACETAMINOPHEN 1 TABLET: 5; 325 TABLET ORAL at 20:33

## 2022-09-21 ASSESSMENT — ACTIVITIES OF DAILY LIVING (ADL)
ADLS_ACUITY_SCORE: 35

## 2022-09-21 ASSESSMENT — ENCOUNTER SYMPTOMS
ABDOMINAL PAIN: 1
FATIGUE: 0
DIZZINESS: 1
DIARRHEA: 1
FEVER: 0
BACK PAIN: 0
SHORTNESS OF BREATH: 0
LIGHT-HEADEDNESS: 1
VOMITING: 1
HEADACHES: 0
NAUSEA: 1
CHILLS: 0
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
## 2022-09-21 NOTE — ED NOTES
Pt wanted to be assessed by [facility] Nurse. Called and spoke with Jose Luis from  [facility] [phone]. Pt's information and call back number provided. [facility] nurse will call ED.
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
## 2022-09-21 NOTE — ED NOTES
Pt having intermittent chest pain and feels anxious when her pain returned. Ordered morphine given. Cardiac monitor and oxymetry applied. Dr toledo informed. Will continue to moniter pain medication effectiveness at this time.

## 2022-09-21 NOTE — ED NOTES
Pt states her pain is now better with morphine. Pt rates her pain 4/10 now and states its going down.

## 2022-09-21 NOTE — ED NOTES
Writer unable to get blood pressure x2, due to pt moving/shaking her leg from having so much pain. Primary RN updated.

## 2022-09-21 NOTE — LETTER
September 22, 2022      To Whom It May Concern:      Morgan Mcbride was seen in our Emergency Department today, 09/22/22.  I expect her condition to improve over the next 2 days.  She may return to work/school when improved.    Sincerely,        Afsnaeh Jones MD

## 2022-09-22 VITALS
TEMPERATURE: 98.3 F | WEIGHT: 287 LBS | SYSTOLIC BLOOD PRESSURE: 166 MMHG | OXYGEN SATURATION: 98 % | BODY MASS INDEX: 54.19 KG/M2 | RESPIRATION RATE: 18 BRPM | DIASTOLIC BLOOD PRESSURE: 112 MMHG | HEART RATE: 89 BPM | HEIGHT: 61 IN

## 2022-09-22 LAB
ATRIAL RATE - MUSE: 113 BPM
DIASTOLIC BLOOD PRESSURE - MUSE: NORMAL MMHG
INTERPRETATION ECG - MUSE: NORMAL
P AXIS - MUSE: 64 DEGREES
PR INTERVAL - MUSE: 148 MS
QRS DURATION - MUSE: 80 MS
QT - MUSE: 336 MS
QTC - MUSE: 460 MS
R AXIS - MUSE: 33 DEGREES
SYSTOLIC BLOOD PRESSURE - MUSE: NORMAL MMHG
T AXIS - MUSE: 0 DEGREES
VENTRICULAR RATE- MUSE: 113 BPM

## 2022-09-22 ASSESSMENT — ACTIVITIES OF DAILY LIVING (ADL): ADLS_ACUITY_SCORE: 35

## 2022-09-22 NOTE — ED NOTES
Pt states she feels way better than when she came in. Pt wanted to go home and take care of her baby. Pt did not want to wait for [facility] nurse number provided. Discharge instructions were reviewed and questions were answered. Pt verbalized understanding of discharge instructions. /112 at this  time but trending down. Dr Jones is aware. Pt tolerated PO intake before discharge from ED. Pt left ED to home at this time.

## 2022-09-22 NOTE — ED NOTES
Pt oxygen dropped to 88% on room air. Pt states she still has some shortness of breath. 3 litter of oxygen applied. Oxygen now 95-98%. Dr Jones updated. Dr Jones at bedside.

## 2022-09-22 NOTE — ED NOTES
Oxygen monitor changed and patient's oxygen levels are normal now. Supplemental oxygen discontinued at this time. Pt is SPO2 is 98% room air at this time. Pt states pain medications are helping. Pt resting in bed at this time. Will continue to monitor.
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
## 2022-11-21 ENCOUNTER — HEALTH MAINTENANCE LETTER (OUTPATIENT)
Age: 32
End: 2022-11-21

## 2023-04-16 ENCOUNTER — HEALTH MAINTENANCE LETTER (OUTPATIENT)
Age: 33
End: 2023-04-16

## 2023-09-11 NOTE — PLAN OF CARE
Routine Visit Note: Medications reviewed. Appetite well. No issues with elimination. Patient denies falls and pain.     Skill/education provided: Vital signs stable. Wound care completed. Education provided about wound healing and vital signs. Cardiopulmonary assessment completed. CP assessed. Patient has cane at chairside.     Patient/caregiver response: Patient verbilized understanding.     Plan for next visit: Vital signs, wound care, assess falls and pain, cardiopulmonary assessment, review medications    Other pertinent info: na VSS and postpartum assessments WDL. Fundus firm at U/U. Scant lochia. Voiding without difficulty and tolerating regular diet. Up and ambulating with steady gait.  Independent with cares.  Bonding well with infant. Encouraged Breastfeeding, pumping and hand expressing, but pt does not seem to be interested, saying it hurts to pump/hand express. Supplementing  with formula now. tylenol given for pain, with no relief per pt report, will give flexeril. Will continue with postpartum cares and education per plan of care.

## 2024-05-06 ENCOUNTER — OFFICE VISIT (OUTPATIENT)
Dept: URGENT CARE | Facility: URGENT CARE | Age: 34
End: 2024-05-06
Payer: COMMERCIAL

## 2024-05-06 VITALS
DIASTOLIC BLOOD PRESSURE: 87 MMHG | SYSTOLIC BLOOD PRESSURE: 138 MMHG | BODY MASS INDEX: 55.61 KG/M2 | HEART RATE: 125 BPM | TEMPERATURE: 99.4 F | WEIGHT: 293 LBS | OXYGEN SATURATION: 98 %

## 2024-05-06 DIAGNOSIS — J02.0 STREP PHARYNGITIS: Primary | ICD-10-CM

## 2024-05-06 DIAGNOSIS — R13.10 ODYNOPHAGIA: ICD-10-CM

## 2024-05-06 LAB — DEPRECATED S PYO AG THROAT QL EIA: POSITIVE

## 2024-05-06 PROCEDURE — 87880 STREP A ASSAY W/OPTIC: CPT | Performed by: EMERGENCY MEDICINE

## 2024-05-06 PROCEDURE — 99203 OFFICE O/P NEW LOW 30 MIN: CPT | Performed by: EMERGENCY MEDICINE

## 2024-05-06 RX ORDER — DEXAMETHASONE SODIUM PHOSPHATE 10 MG/ML
6 INJECTION INTRAMUSCULAR; INTRAVENOUS ONCE
Status: COMPLETED | OUTPATIENT
Start: 2024-05-06 | End: 2024-05-06

## 2024-05-06 RX ORDER — AMOXICILLIN 500 MG/1
500 CAPSULE ORAL 2 TIMES DAILY
Qty: 20 CAPSULE | Refills: 0 | Status: SHIPPED | OUTPATIENT
Start: 2024-05-06 | End: 2024-05-16

## 2024-05-06 RX ORDER — ACETAMINOPHEN 500 MG
500-1000 TABLET ORAL EVERY 6 HOURS PRN
Qty: 30 TABLET | Refills: 0 | Status: SHIPPED | OUTPATIENT
Start: 2024-05-06

## 2024-05-06 RX ADMIN — DEXAMETHASONE SODIUM PHOSPHATE 6 MG: 10 INJECTION INTRAMUSCULAR; INTRAVENOUS at 10:51

## 2024-05-06 NOTE — PROGRESS NOTES
Assessment & Plan     Diagnosis:    ICD-10-CM    1. Strep pharyngitis  J02.0 Streptococcus A Rapid Screen w/Reflex to PCR - Clinic Collect     amoxicillin (AMOXIL) 500 MG capsule      2. Odynophagia  R13.10 dexAMETHasone (DECADRON) injectable solution used ORALLY 6 mg          Medical Decision Making:  Morgan Mcbride is a 33 year old female who presents with sore throat and clinical evidence of pharyngitis.  The rapid strep test is positive. I see no clinical evidence of  peritonsillar abscess, retropharyngeal abscess, epiglottis, or Santy's angina. The patient's symptoms are consistent with streptococcal pharyngitis.  Uvula midline.  Non-toxic appearing.  No drooling.  No stridor. I have recommended treatment with antibiotics and analgesics.  Go to the ER if increasing pain, change in voice, neck pain, vomiting, fever, or shortness of breath. Follow-up with primary physician if not improving in 3-5 days. Patient verbalized understanding and agreement with the plan.    Maverick Riley PA-C  [facility]    Subjective     Morgan Mcbride is a 33 year old female who presents to clinic today for the following health issues:  Chief Complaint   Patient presents with    Pharyngitis     C/o throat pain since Friday, worsening, pain on left side of throat     Otalgia     Left ear pain onset Friday       HPI  Patient with sore throat for last 2 days, left-sided throat more painful she feels her neck is swollen.  Is having some difficulty swallowing, states that it is difficulty to get water down this morning.  The pain radiates to her left ear as well, has not had any hearing loss.  She notes no cough, difficulty breathing, chest pain, discharge from the ears, nausea, vomiting, diarrhea.  She notes that mono was going around her child's .    Review of Systems    See HPI    Objective      Vitals: /87 (BP Location: Left arm, Patient Position: Sitting, Cuff Size: Adult Large)   Pulse  (!) 125   Temp 99.4  F (37.4  C) (Tympanic)   Wt 133.5 kg (294 lb 4.8 oz)   SpO2 98%   BMI 55.61 kg/m    Resp:     Patient Vitals for the past 24 hrs:   BP Temp Temp src Pulse SpO2 Weight   05/06/24 1021 138/87 99.4  F (37.4  C) Tympanic (!) 125 98 % 133.5 kg (294 lb 4.8 oz)       Vital signs reviewed by: Maverick Riley PA-C    Physical Exam   Constitutional: Alert and active. No acute distress.  Non-toxic appearing.  HENT:   Right Ear: External ear normal. TM: gray/pale appearing  Left Ear: External ear normal. TM: gray/pale appearing.  Nose: Nose normal.    Eyes: Conjunctivae, EOM and lids are normal.   Mouth: Mucous membranes are moist. Posterior oropharynx is erythematous. Exudates present. Tonsils are enlarged; L > R. Uvula is midline. No submandibular swelling or erythema.  Neck: Normal ROM. No meningismus. Anterior cervical lymphadenopathy noted on the left. No posterior chain cervical lymphadenopathy noted.  Cardiovascular: Tachycardic. Regular rhythm  Pulmonary/Chest: Effort normal. No respiratory distress. Lungs are clear to auscultation throughout.  GI: Abdomen is soft and non-tender throughout. No hepatosplenomegaly.  Neurological: Alert and appropriately oriented for age.   Skin: No rash noted on visualized skin.       Labs/Imaging:  Results for orders placed or performed in visit on 05/06/24   Streptococcus A Rapid Screen w/Reflex to PCR - Clinic Collect     Status: Abnormal    Specimen: Throat; Swab   Result Value Ref Range    Group A Strep antigen Positive (A) Negative       Interventions:  Decadron 6mg PO      Maverick Riley PA-C, May 6, 2024

## 2024-05-06 NOTE — ADDENDUM NOTE
Addended by: DEBORA ORO on: 5/6/2024 12:37 PM     Modules accepted: Orders     Doxycycline Pregnancy And Lactation Text: This medication is Pregnancy Category D and not consider safe during pregnancy. It is also excreted in breast milk but is considered safe for shorter treatment courses.

## 2024-06-23 ENCOUNTER — HEALTH MAINTENANCE LETTER (OUTPATIENT)
Age: 34
End: 2024-06-23

## 2025-07-12 ENCOUNTER — HEALTH MAINTENANCE LETTER (OUTPATIENT)
Age: 35
End: 2025-07-12

## (undated) DEVICE — ADH SKIN CLOSURE PREMIERPRO EXOFIN 1.0ML 3470

## (undated) DEVICE — DEVICE SUTURE GRASPER TROCAR CLOSURE 14GA PMITCSG

## (undated) DEVICE — NDL SPINAL 25GA 3.5" QUINCKE 405180

## (undated) DEVICE — DRSG PRIMAPORE 02X3" 7133

## (undated) DEVICE — SUCTION MANIFOLD NEPTUNE 2 SYS 4 PORT 0702-020-000

## (undated) DEVICE — ESU GROUND PAD ADULT W/CORD E7507

## (undated) DEVICE — ENDO SCOPE WARMER SEAL  C3101

## (undated) DEVICE — SOL NACL 0.9% INJ 1000ML BAG 2B1324X

## (undated) DEVICE — ENDO POUCH UNIV RETRIEVAL SYSTEM INZII 10MM CD001

## (undated) DEVICE — CLIP APPLIER ENDO 5MM M/L LIGAMAX EL5ML

## (undated) DEVICE — Device

## (undated) DEVICE — NDL INSUFFLATION 13GA 150MM C2202

## (undated) DEVICE — ENDO TROCAR SLEEVE KII Z-THREADED 05X100MM CTS02

## (undated) DEVICE — ENDO DISSECTOR BLUNT 05MM  BTD05

## (undated) DEVICE — SOL WATER IRRIG 1000ML BOTTLE 2F7114

## (undated) DEVICE — ENDO TROCAR FIRST ENTRY KII FIOS ADV FIX 05X100MM CFF03

## (undated) DEVICE — ENDO TROCAR FIRST ENTRY KII FIOS Z-THRD 12X100MM CTF73

## (undated) DEVICE — ANTIFOG SOLUTION W/FOAM PAD 31142527

## (undated) DEVICE — LINEN TOWEL PACK X6 WHITE 5487

## (undated) DEVICE — PREP CHLORAPREP 26ML TINTED ORANGE  260815

## (undated) DEVICE — LINEN TOWEL PACK X30 5481

## (undated) DEVICE — ENDO TROCAR FIRST ENTRY KII FIOS Z-THRD 05X100MM CTF03

## (undated) DEVICE — SYR 30ML LL W/O NDL 302832

## (undated) DEVICE — GLOVE PROTEXIS POWDER FREE SMT 7.5  2D72PT75X

## (undated) DEVICE — SU MONOCRYL 4-0 PS-2 18" UND Y496G

## (undated) DEVICE — ESU ENDO SCISSORS 5MM CVD 5DCS

## (undated) DEVICE — SYR 50ML SLIP TIP W/O NDL 309654

## (undated) RX ORDER — HYDROMORPHONE HCL IN WATER/PF 6 MG/30 ML
PATIENT CONTROLLED ANALGESIA SYRINGE INTRAVENOUS
Status: DISPENSED
Start: 2021-07-30

## (undated) RX ORDER — ONDANSETRON 2 MG/ML
INJECTION INTRAMUSCULAR; INTRAVENOUS
Status: DISPENSED
Start: 2021-07-30

## (undated) RX ORDER — OXYCODONE HYDROCHLORIDE 5 MG/1
TABLET ORAL
Status: DISPENSED
Start: 2021-07-30

## (undated) RX ORDER — FENTANYL CITRATE 50 UG/ML
INJECTION, SOLUTION INTRAMUSCULAR; INTRAVENOUS
Status: DISPENSED
Start: 2021-07-30

## (undated) RX ORDER — PROPOFOL 10 MG/ML
INJECTION, EMULSION INTRAVENOUS
Status: DISPENSED
Start: 2021-07-30

## (undated) RX ORDER — CEFAZOLIN SODIUM IN 0.9 % NACL 3 G/100 ML
INTRAVENOUS SOLUTION, PIGGYBACK (ML) INTRAVENOUS
Status: DISPENSED
Start: 2021-07-30

## (undated) RX ORDER — EPHEDRINE SULFATE 50 MG/ML
INJECTION, SOLUTION INTRAMUSCULAR; INTRAVENOUS; SUBCUTANEOUS
Status: DISPENSED
Start: 2021-07-30

## (undated) RX ORDER — ACETAMINOPHEN 325 MG/1
TABLET ORAL
Status: DISPENSED
Start: 2021-07-30

## (undated) RX ORDER — BUPIVACAINE HYDROCHLORIDE 2.5 MG/ML
INJECTION, SOLUTION EPIDURAL; INFILTRATION; INTRACAUDAL
Status: DISPENSED
Start: 2021-07-30

## (undated) RX ORDER — HYDROMORPHONE HYDROCHLORIDE 1 MG/ML
INJECTION, SOLUTION INTRAMUSCULAR; INTRAVENOUS; SUBCUTANEOUS
Status: DISPENSED
Start: 2021-07-30

## (undated) RX ORDER — LIDOCAINE HYDROCHLORIDE 20 MG/ML
INJECTION, SOLUTION EPIDURAL; INFILTRATION; INTRACAUDAL; PERINEURAL
Status: DISPENSED
Start: 2021-07-30

## (undated) RX ORDER — DEXAMETHASONE SODIUM PHOSPHATE 4 MG/ML
INJECTION, SOLUTION INTRA-ARTICULAR; INTRALESIONAL; INTRAMUSCULAR; INTRAVENOUS; SOFT TISSUE
Status: DISPENSED
Start: 2021-07-30

## (undated) RX ORDER — FENTANYL CITRATE-0.9 % NACL/PF 10 MCG/ML
PLASTIC BAG, INJECTION (ML) INTRAVENOUS
Status: DISPENSED
Start: 2021-07-30